# Patient Record
Sex: MALE | Race: ASIAN | NOT HISPANIC OR LATINO | Employment: OTHER | URBAN - METROPOLITAN AREA
[De-identification: names, ages, dates, MRNs, and addresses within clinical notes are randomized per-mention and may not be internally consistent; named-entity substitution may affect disease eponyms.]

---

## 2018-02-03 ENCOUNTER — OFFICE VISIT (OUTPATIENT)
Dept: URGENT CARE | Facility: CLINIC | Age: 73
End: 2018-02-03
Payer: COMMERCIAL

## 2018-02-03 VITALS
HEIGHT: 72 IN | OXYGEN SATURATION: 96 % | TEMPERATURE: 97.6 F | WEIGHT: 182 LBS | SYSTOLIC BLOOD PRESSURE: 124 MMHG | HEART RATE: 72 BPM | BODY MASS INDEX: 24.65 KG/M2 | DIASTOLIC BLOOD PRESSURE: 82 MMHG | RESPIRATION RATE: 16 BRPM

## 2018-02-03 DIAGNOSIS — J40 BRONCHITIS: ICD-10-CM

## 2018-02-03 PROCEDURE — 99214 OFFICE O/P EST MOD 30 MIN: CPT | Performed by: PHYSICIAN ASSISTANT

## 2018-02-03 RX ORDER — CLARITHROMYCIN 500 MG/1
500 TABLET, COATED ORAL 2 TIMES DAILY
Qty: 20 TAB | Refills: 0 | Status: SHIPPED | OUTPATIENT
Start: 2018-02-03 | End: 2018-02-13

## 2018-02-03 ASSESSMENT — ENCOUNTER SYMPTOMS
NEUROLOGICAL NEGATIVE: 1
SORE THROAT: 0
MYALGIAS: 0
FALLS: 0
EYES NEGATIVE: 1
COUGH: 1
CARDIOVASCULAR NEGATIVE: 1
SHORTNESS OF BREATH: 0
SPUTUM PRODUCTION: 0
FEVER: 0
CONSTITUTIONAL NEGATIVE: 1

## 2018-02-14 ENCOUNTER — OFFICE VISIT (OUTPATIENT)
Dept: MEDICAL GROUP | Facility: MEDICAL CENTER | Age: 73
End: 2018-02-14
Payer: COMMERCIAL

## 2018-02-14 VITALS
WEIGHT: 182 LBS | RESPIRATION RATE: 12 BRPM | HEIGHT: 74 IN | DIASTOLIC BLOOD PRESSURE: 84 MMHG | HEART RATE: 73 BPM | OXYGEN SATURATION: 97 % | TEMPERATURE: 97.9 F | SYSTOLIC BLOOD PRESSURE: 126 MMHG | BODY MASS INDEX: 23.36 KG/M2

## 2018-02-14 DIAGNOSIS — Z00.00 ROUTINE GENERAL MEDICAL EXAMINATION AT A HEALTH CARE FACILITY: ICD-10-CM

## 2018-02-14 DIAGNOSIS — R53.83 OTHER FATIGUE: ICD-10-CM

## 2018-02-14 DIAGNOSIS — F32.0 MILD SINGLE CURRENT EPISODE OF MAJOR DEPRESSIVE DISORDER (HCC): ICD-10-CM

## 2018-02-14 DIAGNOSIS — Z76.89 ENCOUNTER TO ESTABLISH CARE WITH NEW DOCTOR: ICD-10-CM

## 2018-02-14 DIAGNOSIS — E11.69 HYPERLIPIDEMIA ASSOCIATED WITH TYPE 2 DIABETES MELLITUS (HCC): ICD-10-CM

## 2018-02-14 DIAGNOSIS — Z12.5 SCREENING FOR PROSTATE CANCER: ICD-10-CM

## 2018-02-14 DIAGNOSIS — Z13.220 ENCOUNTER FOR LIPID SCREENING FOR CARDIOVASCULAR DISEASE: ICD-10-CM

## 2018-02-14 DIAGNOSIS — Z13.6 ENCOUNTER FOR LIPID SCREENING FOR CARDIOVASCULAR DISEASE: ICD-10-CM

## 2018-02-14 DIAGNOSIS — E11.9 DIABETES MELLITUS TYPE 2 IN NONOBESE (HCC): ICD-10-CM

## 2018-02-14 DIAGNOSIS — R35.1 NOCTURIA MORE THAN TWICE PER NIGHT: ICD-10-CM

## 2018-02-14 DIAGNOSIS — E78.5 HYPERLIPIDEMIA ASSOCIATED WITH TYPE 2 DIABETES MELLITUS (HCC): ICD-10-CM

## 2018-02-14 PROCEDURE — 99214 OFFICE O/P EST MOD 30 MIN: CPT | Performed by: FAMILY MEDICINE

## 2018-02-14 RX ORDER — FLUOXETINE HYDROCHLORIDE 20 MG/1
20 CAPSULE ORAL DAILY
COMMUNITY
End: 2018-03-28 | Stop reason: SDUPTHER

## 2018-02-14 ASSESSMENT — PATIENT HEALTH QUESTIONNAIRE - PHQ9: CLINICAL INTERPRETATION OF PHQ2 SCORE: 0

## 2018-02-14 NOTE — LETTER
[facility]  Neymar Klein M.D.  [address]  [place] [address]  Fax: [phone]   Authorization for Release/Disclosure of   Protected Health Information   Name: FARA ALONZO : 1945 SSN: [id number]   Address: [address] [place] [address]  [place] [address] Phone:    [phone] (home) [phone] (work)   I authorize the entity listed below to release/disclose the PHI below to:   [facility]/Neymar Klein M.D. and Neymar Klein M.D.   Provider or Entity Name: [facility]     Address   City, State, Zip   Phone:      Fax:     Reason for request: continuity of care   Information to be released:    [ XXX ] LAST COLONOSCOPY,  including any PATH REPORT and follow-up  [  ] LAST FIT/COLOGUARD RESULT [  ] LAST DEXA  [  ] LAST MAMMOGRAM  [  ] LAST PAP  [  ] LAST LABS [  ] RETINA EXAM REPORT  [  ] IMMUNIZATION RECORDS  [  ] Release all info      [  ] Check here and initial the line next to each item to release ALL health information INCLUDING  _____ Care and treatment for drug and / or alcohol abuse  _____ HIV testing, infection status, or AIDS  _____ Genetic Testing    DATES OF SERVICE OR TIME PERIOD TO BE DISCLOSED: _____________  I understand and acknowledge that:  * This Authorization may be revoked at any time by you in writing, except if your health information has already been used or disclosed.  * Your health information that will be used or disclosed as a result of you signing this authorization could be re-disclosed by the recipient. If this occurs, your re-disclosed health information may no longer be protected by State or Federal laws.  * You may refuse to sign this Authorization. Your refusal will not affect your ability to obtain treatment.  * This Authorization becomes effective upon signing and will  on (date) __________.      If no date is indicated, this Authorization will  one (1) year from the signature date.    Name: Fara  Fan    Signature:   Date:     2/14/2018       PLEASE FAX REQUESTED RECORDS BACK TO: [phone]

## 2018-02-22 LAB
ALBUMIN SERPL-MCNC: 4.5 G/DL (ref 3.5–4.8)
ALBUMIN/CREAT UR: 6 (ref 0–30)
ALBUMIN/GLOB SERPL: 1.8 {RATIO} (ref 1.2–2.2)
ALP SERPL-CCNC: 70 IU/L (ref 39–117)
ALT SERPL-CCNC: 52 IU/L (ref 0–44)
AST SERPL-CCNC: 33 IU/L (ref 0–40)
BILIRUB SERPL-MCNC: 1 MG/DL (ref 0–1.2)
BUN SERPL-MCNC: 12 MG/DL (ref 8–27)
BUN/CREAT SERPL: 12 (ref 10–24)
CALCIUM SERPL-MCNC: 9.6 MG/DL (ref 8.6–10.2)
CHLORIDE SERPL-SCNC: 95 MMOL/L (ref 96–106)
CHOLEST SERPL-MCNC: 159 MG/DL (ref 100–199)
CO2 SERPL-SCNC: 22 MMOL/L (ref 18–29)
CREAT SERPL-MCNC: 1.02 MG/DL (ref 0.76–1.27)
CREAT UR-MCNC: 155.9 MG/DL
GFR SERPLBLD CREATININE-BSD FMLA CKD-EPI: 73 ML/MIN/{1.73_M2}
GFR SERPLBLD CREATININE-BSD FMLA CKD-EPI: 84 ML/MIN/{1.73_M2}
GLOBULIN SER CALC-MCNC: 2.5 G/L (ref 1.5–4.5)
GLUCOSE SERPL-MCNC: 171 MG/DL (ref 65–99)
HBA1C MFR BLD: 8.2 % (ref 4.8–5.6)
HDLC SERPL-MCNC: 36 MG/DL
LABORATORY COMMENT REPORT: ABNORMAL
LDLC SERPL CALC-MCNC: 95 MG/DL (ref 0–99)
MICROALBUMIN UR-MCNC: 9.3 UG/ML
POTASSIUM SERPL-SCNC: 4.4 MMOL/L (ref 3.5–5.2)
PROT SERPL-MCNC: 7 G/DL (ref 6–8.5)
PSA SERPL-MCNC: 0.7 NG/ML (ref 0–4)
SODIUM SERPL-SCNC: 138 MMOL/L (ref 134–144)
TRIGL SERPL-MCNC: 142 MG/DL (ref 0–149)
TSH SERPL DL<=0.005 MIU/L-ACNC: 2.83 UIU/ML (ref 0.45–4.5)
VLDLC SERPL CALC-MCNC: 28 MG/DL (ref 5–40)

## 2018-02-22 NOTE — ASSESSMENT & PLAN NOTE
Patient with vague symptom of fatigue, states that he feels more tired than normal, that this has been chronic, hasn't gained weight, doesn't have cold intolerance, denies hair and skin changes.    ROS is NEGATIVE for: heat intolerance, anxiety/depression, chest pain/pressure, palpitations, diarrhea/constipation, unexpected weight change.

## 2018-02-22 NOTE — ASSESSMENT & PLAN NOTE
Chronic, stable, well controlled on Prozac 20 mg by mouth daily.  ROS is NEGATIVE for suicidal and homicidal ideation, also negative for visual or auditory hallucinations.

## 2018-02-22 NOTE — ASSESSMENT & PLAN NOTE
Patient reports diagnosis of HLD.  Patient is taking Simvastatin.  No recent labs.    ROS is NEGATIVE for dizziness, generalized weakness/fatigue, cold sweats, dizziness,  vision/hearing changes, jaw pain/paresthesias, BUE pain/paresthesias/numbness/weakness, chest pain/pressure, palpitations, dyspnea, nausea, RUQ abdominal pain, oliguria/anuria, BLE edema.

## 2018-02-22 NOTE — ASSESSMENT & PLAN NOTE
Chronic, uncontrolled, no s/sx of acute vs. Chronic prostatitis, no s/sx of UTI.  NO recent labs for prostate CA screening.    ROS is NEGATIVE for nocturia, polyuria, increased frequency of urination, urinary hesitancy, feeling of incomplete voiding, difficulty initiation urine stream, hematuria, pyuria    ROS is NEGATIVE for fevers, chills, rigors, flank pain, dysuria, hematuria, pyuria, polyuria, increased frequency of urination, diarrhea, constipation.

## 2018-02-22 NOTE — ASSESSMENT & PLAN NOTE
Patient reports diagnosis of DM2.  No recent labs.    We discussed that prediabetes/diabetes is a medical condition of lifestyle habits (less than optimal dietary choices, insufficient cardiovascular exercise).     ROS is NEGATIVE for blurred vision, polydipsia, polyuria, diaphoresis, palpitations, fatigue, irritability, flank pain, BLE paresthesias.

## 2018-02-22 NOTE — PROGRESS NOTES
Subjective:   Chief Complaint/History of Present Illness:  Fara Chavira is a 72 y.o. male patient new to [facility] who presents today to establish primary medical care and to discuss the following medical conditions.  PMH, PSH, Social History, Medications, Allergies all reviewed as documented:    Diabetes mellitus type 2 in nonobese (CMS-HCC)  Patient reports diagnosis of DM2.  No recent labs.    We discussed that prediabetes/diabetes is a medical condition of lifestyle habits (less than optimal dietary choices, insufficient cardiovascular exercise).     ROS is NEGATIVE for blurred vision, polydipsia, polyuria, diaphoresis, palpitations, fatigue, irritability, flank pain, BLE paresthesias.    Hyperlipidemia associated with type 2 diabetes mellitus (CMS-HCC)  Patient reports diagnosis of HLD.  Patient is taking Simvastatin.  No recent labs.    ROS is NEGATIVE for dizziness, generalized weakness/fatigue, cold sweats, dizziness,  vision/hearing changes, jaw pain/paresthesias, BUE pain/paresthesias/numbness/weakness, chest pain/pressure, palpitations, dyspnea, nausea, RUQ abdominal pain, oliguria/anuria, BLE edema.    Nocturia more than twice per night  Chronic, uncontrolled, no s/sx of acute vs. Chronic prostatitis, no s/sx of UTI.  NO recent labs for prostate CA screening.    ROS is NEGATIVE for nocturia, polyuria, increased frequency of urination, urinary hesitancy, feeling of incomplete voiding, difficulty initiation urine stream, hematuria, pyuria    ROS is NEGATIVE for fevers, chills, rigors, flank pain, dysuria, hematuria, pyuria, polyuria, increased frequency of urination, diarrhea, constipation.    Other fatigue  Patient with vague symptom of fatigue, states that he feels more tired than normal, that this has been chronic, hasn't gained weight, doesn't have cold intolerance, denies hair and skin changes.    ROS is NEGATIVE for: heat intolerance, anxiety/depression, chest pain/pressure, palpitations,  diarrhea/constipation, unexpected weight change.    Mild single current episode of major depressive disorder (CMS-HCC)  Chronic, stable, well controlled on Prozac 20 mg by mouth daily.  ROS is NEGATIVE for suicidal and homicidal ideation, also negative for visual or auditory hallucinations.      Patient Active Problem List    Diagnosis Date Noted   • Diabetes mellitus type 2 in nonobese (CMS-HCC) 2018   • Hyperlipidemia associated with type 2 diabetes mellitus (CMS-HCC) 2018   • Mild single current episode of major depressive disorder (CMS-HCC) 2018   • Nocturia more than twice per night 2018   • Other fatigue 2018       Additional History:   Allergies:    Mushroom extract complex and Pcn [penicillins]     Medications:     Current Outpatient Prescriptions Ordered in [facility]   Medication Sig Dispense Refill   • FLUoxetine (PROZAC) 20 MG Cap Take 20 mg by mouth every day.     • glipiZIDE SR (GLUCOTROL) 2.5 MG TABLET SR 24 HR Take 2.5 mg by mouth every day.     • METFORMIN HCL 1,000 mg by Does not apply route 2 Times a Day.     • simvastatin (ZOCOR) 10 MG Tab Take 10 mg by mouth every evening.     • valsartan (DIOVAN) 40 MG Tab Take 40 mg by mouth every day.     • ASPIRIN by Does not apply route.       No current [facility]-ordered facility-administered medications on file.         Past Medical History:     Past Medical History:   Diagnosis Date   • Diabetes (CMS-HCC)         Past Surgical History:     Past Surgical History:   Procedure Laterality Date   • OTHER ABDOMINAL SURGERY      Bowel surgery d/t intusussception (doesn't know LI vs. SI, no GI complaints now)        Social History:     Social History   Substance Use Topics   • Smoking status: Former Smoker     Quit date: 1971   • Smokeless tobacco: Never Used      Comment: experimented in college   • Alcohol use No        Family History:     Family Status   Relation Status   • Mother    • Father    • Sister Alive   •  "Brother    • Brother    • Brother Alive   • Brother Alive   • Brother Alive   • Sister Alive   • Sister    • Sister    • Sister         Family History   Problem Relation Age of Onset   • Diabetes Brother    • Kidney Disease Brother    • Diabetes Sister        ROS:     - Constitutional: Negative for fever, chills, unexpected weight change, and fatigue/generalized weakness.     - Respiratory: Negative for cough, sputum production, chest congestion, dyspnea, wheezing, and crackles.      - Cardiovascular: Negative for chest pain, palpitations, orthopnea, and bilateral lower extremity edema.     - Gastrointestinal: Negative for heartburn, nausea, vomiting, abdominal pain, hematochezia, melena, diarrhea, constipation, and greasy/foul-smelling stools.     - Musculoskeletal: Negative for myalgias, back pain, and joint pain.     - Skin: Negative for rash, itching, cyanotic skin color change.     - NOTE: All other systems reviewed and are negative, except as in HPI.     Objective:   Physical Exam:    Vitals: Blood pressure 126/84, pulse 73, temperature 36.6 °C (97.9 °F), resp. rate 12, height 1.88 m (6' 2.02\"), weight 82.6 kg (182 lb), SpO2 97 %.   BMI: Body mass index is 23.36 kg/m².   General/Constitutional: Vitals as above, Well nourished, well developed male in no acute distress   Head/Eyes:  - Head is grossly normal & atraumatic  - Bilateral conjunctivae clear and not injected, bilateral EOMI, bilateral PERRL   ENT:   - Bilateral external ears grossly normal in appearance, external auditory canals clear & bilateral TMs visualized with appropriate cone of light reflex, hearing grossly intact  - External nares normal in appearance and without discharge/bleeding, bilateral turbinates non-erythematous/non-edematous and without discharge/bleeding  - Good dentition ,  posterior oropharynx without erythema/edema/exudates  Neck: Neck supple, no masses, neck non-tender to palpation, no " thyromegaly/goiter   Respiratory: No respiratory distress, bilateral lungs are clear to auscultation in all lung fields (anterior/lateral/posterior), no wheezing/rhonchi/rales   Cardiovascular: Regular rate and rhythm without murmur/gallops/rubs, distal pulses equal and 2+ bilaterally (radial, posterior tibial), no bilateral lower extremity edema   Gastrointestinal: Abdomen resonant to percussion, Bowel sounds present in all 4 quadrants, abdomen non-tender to light and deep palpation   MSK: Gait grossly normal & not antalgic, no tenderness to percussion of vertebral processes, no CVAT, no bilateral SI joint tenderness   Integumentary: No apparent rashes   Neuro: Gross motor movement intact in all 4 extremities, Gross sensation intact to extremities and trunk, Gait grossly normal and not antalgic   Psych: Judgment grossly appropriate, no apparent depression/anxiety    Health Maintenance:     - I have requested previous records ([facility]), and will update accordingly.    Imaging/Labs:     - I have requested previous records ([facility]), and will update accordingly.    Assessment and Plan:   1. Encounter to establish care with new doctor  Chronic, stable, well-controlled.  In good health apart from fatigue.    2. Diabetes mellitus type 2 in nonobese (CMS-[facility])  Chronic, unknown control.  Labs to evaluate, continue present medications.   - HEMOGLOBIN A1C; Future   - COMP METABOLIC PANEL; Future   - LIPID PROFILE; Future   - MICROALB/CREAT RATIO RAND. UR    3. Hyperlipidemia associated with type 2 diabetes mellitus (CMS-HCC)  Chronic, unknown control.  Labs to evaluate, continue present medication.   - LIPID PROFILE; Future    4. Nocturia more than twice per night  Acute, new problem, uncontrolled.  PSA to evaluate, consider ELIZABETH depending on PSA level   - PROSTATE SPECIFIC AG SCREENING; Future    5. Other fatigue  Acute, new problem, uncontrolled, thyroid labs to evaluate.   - TSH WITH REFLEX TO  FT4; Future    6. Mild single current episode of major depressive disorder (CMS-HCC)  Chronic, Stable, well controlled. Continue Prozac at current dosage. Patient to consider down titration over time.   - COMP METABOLIC PANEL; Future    7. Routine general medical examination at a health care facility  Unknown control of metabolic health. Labs as below to evaluate.   - HEMOGLOBIN A1C; Future   - COMP METABOLIC PANEL; Future   - LIPID PROFILE; Future   - PROSTATE SPECIFIC AG SCREENING; Future   - MICROALB/CREAT RATIO RAND. UR   - TSH WITH REFLEX TO FT4; Future    8. Encounter for lipid screening for cardiovascular disease   - LIPID PROFILE; Future    9. Screening for prostate cancer   - PROSTATE SPECIFIC AG SCREENING; Future      RTC: in 1month for diabetes & depression management.    PLEASE NOTE: This dictation was created using voice recognition software. I have made every reasonable attempt to correct obvious errors, but I expect that there are errors of grammar and possibly content that I did not discover before finalizing the note.

## 2018-03-19 ENCOUNTER — OFFICE VISIT (OUTPATIENT)
Dept: MEDICAL GROUP | Facility: MEDICAL CENTER | Age: 73
End: 2018-03-19
Payer: COMMERCIAL

## 2018-03-19 VITALS
HEIGHT: 72 IN | HEART RATE: 63 BPM | DIASTOLIC BLOOD PRESSURE: 60 MMHG | WEIGHT: 180 LBS | BODY MASS INDEX: 24.38 KG/M2 | OXYGEN SATURATION: 95 % | TEMPERATURE: 98 F | SYSTOLIC BLOOD PRESSURE: 96 MMHG

## 2018-03-19 DIAGNOSIS — E78.5 HYPERLIPIDEMIA ASSOCIATED WITH TYPE 2 DIABETES MELLITUS (HCC): ICD-10-CM

## 2018-03-19 DIAGNOSIS — R35.1 NOCTURIA MORE THAN TWICE PER NIGHT: ICD-10-CM

## 2018-03-19 DIAGNOSIS — E11.69 HYPERLIPIDEMIA ASSOCIATED WITH TYPE 2 DIABETES MELLITUS (HCC): ICD-10-CM

## 2018-03-19 PROCEDURE — 99214 OFFICE O/P EST MOD 30 MIN: CPT | Performed by: FAMILY MEDICINE

## 2018-03-19 RX ORDER — VALSARTAN 80 MG/1
TABLET ORAL
COMMUNITY
Start: 2018-02-20 | End: 2018-03-19

## 2018-03-19 RX ORDER — SIMVASTATIN 20 MG
TABLET ORAL
COMMUNITY
Start: 2018-02-28 | End: 2018-04-03 | Stop reason: SDUPTHER

## 2018-03-19 RX ORDER — FLUOXETINE 20 MG/1
TABLET, FILM COATED ORAL
COMMUNITY
Start: 2018-02-20 | End: 2018-03-19

## 2018-03-19 NOTE — LETTER
[facility]  Neymar Klein M.D.  [address]  [facility] [address]  Fax: [phone]   Authorization for Release/Disclosure of   Protected Health Information   Name: EUGENIO ALONZO : 1945 SSN: [id number]   Address: [address] [place] [address] Phone:    [phone] (home) [phone] (work)   I authorize the entity listed below to release/disclose the PHI below to:   [facility]/Neymar Klein M.D. and Neymar Klein M.D.   Provider or Entity Name:  [facility]   Address   City, [facility], Zip:               [address] [place] [address]   Phone:  [phone]      Fax:      [phone]        Reason for request: continuity of care   Information to be released:    [ X ] LAST COLONOSCOPY,  including any PATH REPORT and follow-up  [ X ] LAST FIT/COLOGUARD RESULT [  ] LAST DEXA  [  ] LAST MAMMOGRAM  [  ] LAST PAP  [  ] LAST LABS [  ] RETINA EXAM REPORT  [  ] IMMUNIZATION RECORDS  [  ] Release all info      [  ] Check here and initial the line next to each item to release ALL health information INCLUDING  _____ Care and treatment for drug and / or alcohol abuse  _____ HIV testing, infection status, or AIDS  _____ Genetic Testing    DATES OF SERVICE OR TIME PERIOD TO BE DISCLOSED: _____________  I understand and acknowledge that:  * This Authorization may be revoked at any time by you in writing, except if your health information has already been used or disclosed.  * Your health information that will be used or disclosed as a result of you signing this authorization could be re-disclosed by the recipient. If this occurs, your re-disclosed health information may no longer be protected by State or Federal laws.  * You may refuse to sign this Authorization. Your refusal will not affect your ability to obtain treatment.  * This Authorization becomes effective upon signing and will  on (date) __________.      If no date is indicated, this  Authorization will  one (1) year from the signature date.    Name: Fraa Chavira    Signature:   Date:     3/19/2018       PLEASE FAX REQUESTED RECORDS BACK TO: [phone]

## 2018-03-19 NOTE — LETTER
[facility]  Neymar Klein M.D.  [address]  [place] [address]  Fax: [phone]   Authorization for Release/Disclosure of   Protected Health Information   Name: FARA ALONZO : 1945 SSN: [id number]   Address: [address] [place] [address]  [place] [address] Phone:    [phone] (home) [phone] (work)   I authorize the entity listed below to release/disclose the PHI below to:   [facility]/Neymar Klein M.D. and Neymar Klein M.D.   Provider or Entity Name:     Address   City, State, Zip   Phone:      Fax:     Reason for request: continuity of care   Information to be released:    [  ] LAST COLONOSCOPY,  including any PATH REPORT and follow-up  [  ] LAST FIT/COLOGUARD RESULT [  ] LAST DEXA  [  ] LAST MAMMOGRAM  [  ] LAST PAP  [  ] LAST LABS [  ] RETINA EXAM REPORT  [  ] IMMUNIZATION RECORDS  [  ] Release all info      [  ] Check here and initial the line next to each item to release ALL health information INCLUDING  _____ Care and treatment for drug and / or alcohol abuse  _____ HIV testing, infection status, or AIDS  _____ Genetic Testing    DATES OF SERVICE OR TIME PERIOD TO BE DISCLOSED: _____________  I understand and acknowledge that:  * This Authorization may be revoked at any time by you in writing, except if your health information has already been used or disclosed.  * Your health information that will be used or disclosed as a result of you signing this authorization could be re-disclosed by the recipient. If this occurs, your re-disclosed health information may no longer be protected by State or Federal laws.  * You may refuse to sign this Authorization. Your refusal will not affect your ability to obtain treatment.  * This Authorization becomes effective upon signing and will  on (date) __________.      If no date is indicated, this Authorization will  one (1) year from the signature date.    Name: Fara Alonzo    Signature:   Date:     3/19/2018       PLEASE FAX REQUESTED RECORDS BACK TO: [phone]

## 2018-03-19 NOTE — PATIENT INSTRUCTIONS
"Dr. Klein's tips for \"Lifestyle Medicine:\"     Check out the talk/documentary on \"How not to die\" by Dr. Hieu Madrid (on his website nutritionfacts.org, he also authored a book with this title).       1) Make SMART lifestyle changes: Specific, Measurable, Attainable, Relevant, Time-sensitive.  The lifestyle changes that you need to make are with regards to: nutrition, cardiovascular exercise, sleep, stress management.  Make these changes every 2 weeks, revisiting the previous goals and perhaps revising them and/or setting new ones.       2) Nutrition: Make as many changes as you can to increase the amount of whole-foods (not Whole Foods, necessarily!  ;-)), plant-based diet as possible:   A) Books: Eat to Live (Dr. Adalberto Holloway), The Spectrum (Dr. Alfredo Guerrero), The Starch Solution (Dr. Wiley Mendes)      B) Documentaries (can usually be found on [employer]): [place] Over [place]ives.  Fat, Sick, and Nearly Dead.  Fed Up.           3) Cardiovascular Exercise: The center for disease control recommends a minimum of 150 minutes per week of moderate intensity cardiovascular exercise for weight maintenance and cardiovascular health.  Set this as your initial goal, with at least 30 minutes per session. Types of exercise can include 30 minutes of elliptical, 30 minutes of decently fast jog, 30 minutes of swimming, 30 minutes of heavy gardening (lifting big bags of fertilizer, digging deep holes/ditches).  He can cut down the minute requirements to half, by doing higher intensity sports such as a game of tennis, or soccer.  He notes the library and check out with they have for home exercise programs, as well.       4) Sleep:    A) Goal: Obtain a minimum of 7-8hours of continuous, uninterrupted, restful sleep per night.    B) Tips for Sleep Hygiene:    I) Go to bed and wake up at consistent times whether work/school day or not.     II) Keep room dark, quiet, and comfortable.  Increase exposure to sunlight during awake times and " avoid bright lights (especially anything with a backlight) at least the last 1-2hours before going to sleep.     III) Don't nap.     IV) Avoid stimulant or caffeine use more than 4 hours after wake time.        5) Stress Management: You cannot change the stresses of life dizziness necessarily, but you can change how he responds of them. One good way to manage stress is to write things down in order to help you process how to approach things in general or specifically. Another good way is to talk it out with someone you trusts, specifically your significant other or good friend. A definite great way to deal with stress is to have cardiovascular exercise!

## 2018-03-23 NOTE — PROGRESS NOTES
Subjective:   Chief Complaint/History of Present Illness:  Fara Chavira is a 72 y.o. male established patient who presents today to discuss management of diabetes, HLD, and nocturia:    Uncontrolled type 2 diabetes mellitus without complication, without long-term current use of insulin (CMS-HCC)  We discussed that patient is diagnosed with diabetes, uncontrolled, given that the A1c is:   Lab Results   Component Value Date/Time    HBA1C 8.2 (H) 02/19/2018 10:10 AM        We discussed that prediabetes/diabetes is a medical condition of lifestyle habits (less than optimal dietary choices, insufficient cardiovascular exercise).     Furthermore, we discussed that at present time it is better to pursue lifestyle changes rather than changing medications. Patient is in agreement. Please see review of systems as below.    ROS is NEGATIVE for blurred vision, polydipsia, polyuria, diaphoresis, palpitations, fatigue, irritability, flank pain, BLE paresthesias.    Hyperlipidemia associated with type 2 diabetes mellitus (CMS-[facility])  Patient and I discussed recent labs (see below; HLD with low HDL) and that ASCVD risk is increased based on most recent lipid panel, current blood pressure (hypertensive, with medication), diabetes status (diabetic), and smoking status (non-smoker).    Patient and I then discussed necessary dietary changes to make to address dyslipidemia.  Patient verbalized understanding.    ROS is NEGATIVE for dizziness, generalized weakness/fatigue, vision/hearing changes, jaw pain/paresthesias, BUE pain/paresthesias/numbness/weakness, chest pain/pressure, palpitations, dyspnea, RUQ abdominal pain, oliguria/anuria, BLE edema.    Lab Results   Component Value Date/Time    CHOLSTRLTOT 159 02/19/2018 10:10 AM    LDL 95 02/19/2018 10:10 AM    HDL 36 (L) 02/19/2018 10:10 AM    TRIGLYCERIDE 142 02/19/2018 10:10 AM       Nocturia more than twice per night  Chronic, uncontrolled, unchanged from last visit on  02/21/18.      Patient Active Problem List    Diagnosis Date Noted   • Uncontrolled type 2 diabetes mellitus without complication, without long-term current use of insulin (CMS-HCC) 02/14/2018   • Hyperlipidemia associated with type 2 diabetes mellitus (CMS-HCC) 02/14/2018   • Mild single current episode of major depressive disorder (CMS-HCC) 02/14/2018   • Nocturia more than twice per night 02/14/2018   • Other fatigue 02/14/2018       Additional History:   Allergies:    Mushroom extract complex and Pcn [penicillins]     Current Medications:     Current Outpatient Prescriptions   Medication Sig Dispense Refill   • metformin (GLUCOPHAGE) 1000 MG tablet Take 1,000 mg by mouth 2 Times a Day.     • simvastatin (ZOCOR) 20 MG Tab      • FLUoxetine (PROZAC) 20 MG Cap Take 20 mg by mouth every day.     • glipiZIDE SR (GLUCOTROL) 2.5 MG TABLET SR 24 HR Take 2.5 mg by mouth 2 Times a Day.     • valsartan (DIOVAN) 40 MG Tab Take 40 mg by mouth every day.     • ASPIRIN by Does not apply route.       No current facility-administered medications for this visit.         Social History:     Social History   Substance Use Topics   • Smoking status: Former Smoker     Quit date: 12/26/1971   • Smokeless tobacco: Never Used      Comment: experimented in college   • Alcohol use No       ROS:     - NOTE: All other systems reviewed and are negative, except as in HPI.     Objective:   Physical Exam:    Vitals: Blood pressure (!) 96/60, pulse 63, temperature 36.7 °C (98 °F), height 1.829 m (6'), weight 81.6 kg (180 lb), SpO2 95 %.   BMI: Body mass index is 24.41 kg/m².   General/Constitutional: Vitals as above, Well nourished, well developed male in no acute distress   Head/Eyes: Head is grossly normal & atraumatic, bilateral conjunctivae clear and not injected, bilateral EOMI, bilateral PERRL   ENT: Bilateral external ears grossly normal in appearance, Hearing grossly intact, External nares normal in appearance and without  discharge/bleeding   Respiratory: No respiratory distress, bilateral lungs are clear to ausculation in all lung fields (anterior/lateral/posterior), no wheezing/rhonchi/rales   Cardiovascular: Regular rate and rhythm without murmur/gallops/rubs, distal pulses are intact and equal bilaterally (radial, posterior tibial), no bilateral lower extremity edema   MSK: Gait grossly normal & not antalgic   Integumentary: No apparent rashes   Diabetic Foot Exam: No ulcers/laceration/blisters present on bilateral feet, normal gross anatomy of bilateral feet without abnormal curvature or arch, no toe deformities, no toenail thickness, no ingrown toenails, no increase in skin temperature bilaterally, no skin erythema to bilateral feet, bilateral dorsalis pedis and posterior tibial pulses 2+ and equal, Refill less than 2 seconds bilaterally, Glenn 10 g monofilament testing normal in 3 of four regions in both feet (bilateral great toes, bilateral balls of feet at medial/lateral/mid ball of foot; diminished at ball of bilateral great toes)   Psych: Judgment grossly appropriate, no apparent depression/anxiety    Health Maintenance:     - Not addressed at this visit    Imaging/Labs:     - 02/22/18 - uncontrolled diabetes, low HDL (good cholesterol), CMP WNL, PSA WNL, TSH WNL    Assessment and Plan:   1. Uncontrolled type 2 diabetes mellitus without complication, without long-term current use of insulin (CMS-HCC)  Chronic, uncontrolled.  Continue current medication + start & strongly pursue lifestyle changes   - Diabetic Monofilament Lower Extremity Exam    2. Hyperlipidemia associated with type 2 diabetes mellitus (CMS-HCC)  Chronic, uncontrolled, continue statin + pursue lifestyle changes    3. Nocturia more than twice per night  Chronic, uncontrolled, patient Advised to be more mindful of fluid intake, and to present if this worsens/changes.  This could still be BPH, and we can initiate appropriate therapy if this doesn't change  despite fluid restriction.    RTC: in 3-4months for DM2 management, HLD management.    PLEASE NOTE: This dictation was created using voice recognition software. I have made every reasonable attempt to correct obvious errors, but I expect that there are errors of grammar and possibly content that I did not discover before finalizing the note.

## 2018-03-23 NOTE — ASSESSMENT & PLAN NOTE
We discussed that patient is diagnosed with diabetes, uncontrolled, given that the A1c is:   Lab Results   Component Value Date/Time    HBA1C 8.2 (H) 02/19/2018 10:10 AM        We discussed that prediabetes/diabetes is a medical condition of lifestyle habits (less than optimal dietary choices, insufficient cardiovascular exercise).     Furthermore, we discussed that at present time it is better to pursue lifestyle changes rather than changing medications. Patient is in agreement. Please see review of systems as below.    ROS is NEGATIVE for blurred vision, polydipsia, polyuria, diaphoresis, palpitations, fatigue, irritability, flank pain, BLE paresthesias.

## 2018-03-23 NOTE — ASSESSMENT & PLAN NOTE
Patient and I discussed recent labs (see below; HLD with low HDL) and that ASCVD risk is increased based on most recent lipid panel, current blood pressure (hypertensive, with medication), diabetes status (diabetic), and smoking status (non-smoker).    Patient and I then discussed necessary dietary changes to make to address dyslipidemia.  Patient verbalized understanding.    ROS is NEGATIVE for dizziness, generalized weakness/fatigue, vision/hearing changes, jaw pain/paresthesias, BUE pain/paresthesias/numbness/weakness, chest pain/pressure, palpitations, dyspnea, RUQ abdominal pain, oliguria/anuria, BLE edema.    Lab Results   Component Value Date/Time    CHOLSTRLTOT 159 02/19/2018 10:10 AM    LDL 95 02/19/2018 10:10 AM    HDL 36 (L) 02/19/2018 10:10 AM    TRIGLYCERIDE 142 02/19/2018 10:10 AM

## 2018-03-28 ENCOUNTER — PATIENT MESSAGE (OUTPATIENT)
Dept: MEDICAL GROUP | Facility: MEDICAL CENTER | Age: 73
End: 2018-03-28

## 2018-03-28 DIAGNOSIS — I10 ESSENTIAL HYPERTENSION: ICD-10-CM

## 2018-03-28 DIAGNOSIS — F39 MOOD DISORDER (HCC): ICD-10-CM

## 2018-03-28 RX ORDER — FLUOXETINE HYDROCHLORIDE 20 MG/1
20 CAPSULE ORAL DAILY
Qty: 90 CAP | Refills: 3 | Status: SHIPPED | OUTPATIENT
Start: 2018-03-28 | End: 2019-04-16 | Stop reason: SDUPTHER

## 2018-03-28 RX ORDER — VALSARTAN 40 MG/1
40 TABLET ORAL DAILY
Qty: 90 TAB | Refills: 3 | Status: SHIPPED | OUTPATIENT
Start: 2018-03-28 | End: 2019-04-16 | Stop reason: SDUPTHER

## 2018-04-03 DIAGNOSIS — E11.69 HYPERLIPIDEMIA ASSOCIATED WITH TYPE 2 DIABETES MELLITUS (HCC): ICD-10-CM

## 2018-04-03 DIAGNOSIS — E78.5 HYPERLIPIDEMIA ASSOCIATED WITH TYPE 2 DIABETES MELLITUS (HCC): ICD-10-CM

## 2018-04-03 RX ORDER — SIMVASTATIN 20 MG
20 TABLET ORAL EVERY EVENING
Qty: 90 TAB | Refills: 2 | Status: SHIPPED | OUTPATIENT
Start: 2018-04-03 | End: 2018-06-26 | Stop reason: SDUPTHER

## 2018-06-26 ENCOUNTER — PATIENT MESSAGE (OUTPATIENT)
Dept: MEDICAL GROUP | Facility: MEDICAL CENTER | Age: 73
End: 2018-06-26

## 2018-06-26 DIAGNOSIS — E11.69 HYPERLIPIDEMIA ASSOCIATED WITH TYPE 2 DIABETES MELLITUS (HCC): ICD-10-CM

## 2018-06-26 DIAGNOSIS — E78.5 HYPERLIPIDEMIA ASSOCIATED WITH TYPE 2 DIABETES MELLITUS (HCC): ICD-10-CM

## 2018-06-26 PROBLEM — I15.2 HYPERTENSION ASSOCIATED WITH DIABETES (HCC): Status: ACTIVE | Noted: 2018-03-28

## 2018-06-26 PROBLEM — E11.59 HYPERTENSION ASSOCIATED WITH DIABETES (HCC): Status: ACTIVE | Noted: 2018-03-28

## 2018-06-26 RX ORDER — SIMVASTATIN 20 MG
20 TABLET ORAL EVERY EVENING
Qty: 90 TAB | Refills: 2 | Status: SHIPPED | OUTPATIENT
Start: 2018-06-26 | End: 2019-04-16 | Stop reason: SDUPTHER

## 2018-08-02 ENCOUNTER — OFFICE VISIT (OUTPATIENT)
Dept: MEDICAL GROUP | Facility: MEDICAL CENTER | Age: 73
End: 2018-08-02
Payer: COMMERCIAL

## 2018-08-02 VITALS
WEIGHT: 184 LBS | DIASTOLIC BLOOD PRESSURE: 58 MMHG | SYSTOLIC BLOOD PRESSURE: 120 MMHG | OXYGEN SATURATION: 94 % | HEIGHT: 72 IN | TEMPERATURE: 97.7 F | HEART RATE: 65 BPM | BODY MASS INDEX: 24.92 KG/M2

## 2018-08-02 DIAGNOSIS — I15.2 HYPERTENSION ASSOCIATED WITH DIABETES (HCC): ICD-10-CM

## 2018-08-02 DIAGNOSIS — E11.69 HYPERLIPIDEMIA ASSOCIATED WITH TYPE 2 DIABETES MELLITUS (HCC): ICD-10-CM

## 2018-08-02 DIAGNOSIS — E78.5 HYPERLIPIDEMIA ASSOCIATED WITH TYPE 2 DIABETES MELLITUS (HCC): ICD-10-CM

## 2018-08-02 DIAGNOSIS — E11.59 HYPERTENSION ASSOCIATED WITH DIABETES (HCC): ICD-10-CM

## 2018-08-02 PROBLEM — R53.83 OTHER FATIGUE: Status: RESOLVED | Noted: 2018-02-14 | Resolved: 2018-08-02

## 2018-08-02 LAB
HBA1C MFR BLD: 7.8 % (ref ?–5.8)
INT CON NEG: NEGATIVE
INT CON POS: POSITIVE

## 2018-08-02 PROCEDURE — 83036 HEMOGLOBIN GLYCOSYLATED A1C: CPT | Performed by: FAMILY MEDICINE

## 2018-08-02 PROCEDURE — 99214 OFFICE O/P EST MOD 30 MIN: CPT | Performed by: FAMILY MEDICINE

## 2018-08-02 NOTE — ASSESSMENT & PLAN NOTE
Patient and I discussed recent labs (see below; mixed dyslipidemia, with low HDL at last check in February) and that ASCVD risk is increased based on most recent lipid panel, current blood pressure (hypertensive, with medication), diabetes status (diabetic), and smoking status (non-smoker).    Patient and I then discussed necessary dietary changes to make to address dyslipidemia.  Patient is currently taking cholesterol lowering medication: Simvastatin.  Patient verbalized understanding.    Of note, patient states that he is currently eating a breakfast omelette at work most days of the week.  While this does have a good amount of vegetables, I have advised patient to cut back on the number of eggs consumed per week as this can raise his LDL cholesterol and therefore his ASCVD risk.    ROS is NEGATIVE for dizziness, generalized weakness/fatigue, vision/hearing changes, jaw pain/paresthesias, BUE pain/paresthesias/numbness/weakness, chest pain/pressure, palpitations, dyspnea, RUQ abdominal pain, oliguria/anuria, BLE edema.    Lab Results   Component Value Date/Time    CHOLSTRLTOT 159 02/19/2018 10:10 AM    LDL 95 02/19/2018 10:10 AM    HDL 36 (L) 02/19/2018 10:10 AM    TRIGLYCERIDE 142 02/19/2018 10:10 AM

## 2018-08-02 NOTE — PROGRESS NOTES
Subjective:   Chief Complaint/History of Present Illness:  Fara Chavira is a 72 y.o. male established patient who presents today to discuss medical problems as listed below    Diagnoses of Uncontrolled type 2 diabetes mellitus without complication, without long-term current use of insulin (HCC), Hyperlipidemia associated with type 2 diabetes mellitus (HCC), and Hypertension associated with diabetes (HCC) were pertinent to this visit.    Uncontrolled type 2 diabetes mellitus without complication, without long-term current use of insulin (CMS-HCC) ([facility])  We discussed that patient is diagnosed with diabetes, chronic, non-controlled, but improved given that the A1c is:   Lab Results   Component Value Date/Time    HBA1C 7.8 08/02/2018 11:10 AM        Patient is currently taking (metformin and glipizide) for glycemic control, and (simvastatin) for health maintenance related to diabetes mellitus.    We discussed that prediabetes/diabetes mellitus type 2 are medical conditions of lifestyle habits (less than optimal dietary choices, insufficient cardiovascular exercise), and that they can be controlled (in part or in whole) by these lifestyle changes.     Furthermore, we discussed that at present time it is better to pursue lifestyle changes rather than changing medications. Patient is in agreement.     Please see assessment/plan as below for further details.    ROS is NEGATIVE for blurred vision, polydipsia, polyuria, diaphoresis, palpitations, fatigue, irritability, flank pain, BLE paresthesias.     Hyperlipidemia associated with type 2 diabetes mellitus (CMS-HCC) ([facility])  Patient and I discussed recent labs (see below; mixed dyslipidemia, with low HDL at last check in February) and that ASCVD risk is increased based on most recent lipid panel, current blood pressure (hypertensive, with medication), diabetes status (diabetic), and smoking status (non-smoker).    Patient and I then discussed necessary dietary changes to  make to address dyslipidemia.  Patient is currently taking cholesterol lowering medication: Simvastatin.  Patient verbalized understanding.    Of note, patient states that he is currently eating a breakfast omelette at work most days of the week.  While this does have a good amount of vegetables, I have advised patient to cut back on the number of eggs consumed per week as this can raise his LDL cholesterol and therefore his ASCVD risk.    ROS is NEGATIVE for dizziness, generalized weakness/fatigue, vision/hearing changes, jaw pain/paresthesias, BUE pain/paresthesias/numbness/weakness, chest pain/pressure, palpitations, dyspnea, RUQ abdominal pain, oliguria/anuria, BLE edema.    Lab Results   Component Value Date/Time    CHOLSTRLTOT 159 02/19/2018 10:10 AM    LDL 95 02/19/2018 10:10 AM    HDL 36 (L) 02/19/2018 10:10 AM    TRIGLYCERIDE 142 02/19/2018 10:10 AM       Hypertension associated with diabetes ([facility])  Chronic, stable, well-controlled, taking medication as directed.     ROS is NEGATIVE for dizziness, generalized weakness/fatigue, cold sweats, dizziness,  vision/hearing changes, jaw pain/paresthesias, BUE pain/paresthesias/numbness/weakness, chest pain/pressure, palpitations, dyspnea, nausea, RUQ abdominal pain, oliguria/anuria, BLE edema.      Patient Active Problem List    Diagnosis Date Noted   • Hypertension associated with diabetes ([facility]) 03/28/2018   • Mood disorder ([facility]) 03/28/2018   • Uncontrolled type 2 diabetes mellitus without complication, without long-term current use of insulin ([facility]) 02/14/2018   • Hyperlipidemia associated with type 2 diabetes mellitus ([facility]) 02/14/2018   • Mild single current episode of major depressive disorder ([facility]) 02/14/2018   • Nocturia more than twice per night 02/14/2018       Additional History:   Allergies:    Mushroom extract complex and Pcn [penicillins]     Current Medications:     Current Outpatient Prescriptions   Medication Sig Dispense Refill   • simvastatin  (ZOCOR) 20 MG Tab Take 1 Tab by mouth every evening. 90 Tab 2   • metformin (GLUCOPHAGE) 1000 MG tablet Take 1 Tab by mouth 2 Times a Day. 180 Tab 2   • FLUoxetine (PROZAC) 20 MG Cap Take 1 Cap by mouth every day. 90 Cap 3   • valsartan (DIOVAN) 40 MG Tab Take 1 Tab by mouth every day. 90 Tab 3   • glipiZIDE SR (GLUCOTROL) 2.5 MG TABLET SR 24 HR Take 2.5 mg by mouth 2 Times a Day.     • ASPIRIN by Does not apply route.       No current facility-administered medications for this visit.         Social History:     Social History   Substance Use Topics   • Smoking status: Former Smoker     Quit date: 12/26/1971   • Smokeless tobacco: Never Used      Comment: experimented in college   • Alcohol use No       ROS:     - NOTE: All other systems reviewed and are negative, except as in HPI.     Objective:   Physical Exam:    Vitals: Blood pressure 120/58, pulse 65, temperature 36.5 °C (97.7 °F), height 1.829 m (6'), weight 83.5 kg (184 lb), SpO2 94 %.   BMI: Body mass index is 24.95 kg/m².   General/Constitutional: Vitals as above, Well nourished, well developed male in no acute distress   Head/Eyes: Head is grossly normal & atraumatic, bilateral conjunctivae clear and not injected, bilateral EOMI, bilateral PERRL   ENT: Bilateral external ears grossly normal in appearance, Hearing grossly intact, External nares normal in appearance and without discharge/bleeding   Respiratory: No respiratory distress, bilateral lungs are clear to ausculation in all lung fields (anterior/lateral/posterior), no wheezing/rhonchi/rales   Cardiovascular: Regular rate and rhythm without murmur/gallops/rubs, distal pulses are intact and equal bilaterally (radial, posterior tibial), no bilateral lower extremity edema   MSK: Gait grossly normal & not antalgic   Integumentary: No apparent rashes   Psych: Judgment grossly appropriate, no apparent depression/anxiety    Health Maintenance:     - A1c will be screened today, no other diabetic health  maintenance due at this time.    Imaging/Labs:     - Point-of-care A1c today is 7.8%.    Assessment and Plan:   1. Uncontrolled type 2 diabetes mellitus without complication, without long-term current use of insulin (HCC)  Chronic, uncontrolled, improving.  Patient advised to continue with dietary changes, as well as changes to progress exercise.  Additionally, patient will work on decreasing his consumption of eggs and other foods that can elevate his LDL cholesterol.  We will then recheck his lipid profile in approximately 3 months.   - POCT  A1C   - LIPID PROFILE; Future    2. Hyperlipidemia associated with type 2 diabetes mellitus (HCC)  Chronic, controlled, presumed to be worsening since patient is eating eggs on a more regular basis and he was previously.  Please see lifestyle changes advice as above.   - LIPID PROFILE; Future    3. Hypertension associated with diabetes (HCC)  Chronic, stable, controlled.  Continue current medications, and work on lifestyle changes.   - LIPID PROFILE; Future        RTC: In 3 months to follow-up on diabetes management, cholesterol management, and weight loss.    PLEASE NOTE: This dictation was created using voice recognition software. I have made every reasonable attempt to correct obvious errors, but I expect that there are errors of grammar and possibly content that I did not discover before finalizing the note.

## 2018-08-02 NOTE — LETTER
[facility]  Neymar Klein M.D.  [address]  [place] [address]  Fax: [phone]   Authorization for Release/Disclosure of   Protected Health Information   Name: FARA ALONZO : 1945 SSN: [id number]   Address: [address] [place] [address]  [place] [address] Phone:    [phone] (home) [phone] (work)   I authorize the entity listed below to release/disclose the PHI below to:   [facility]/Neymar Klein M.D. and Neymar Klein M.D.   Provider or Entity Name:  [facility] [facility]   Address   City, State, [facility]   Phone:      Fax:     Reason for request: continuity of care   Information to be released:    [  ] LAST COLONOSCOPY,  including any PATH REPORT and follow-up  [  ] LAST FIT/COLOGUARD RESULT [  ] LAST DEXA  [  ] LAST MAMMOGRAM  [  ] LAST PAP  [  ] LAST LABS [  ] RETINA EXAM REPORT  [X] IMMUNIZATION RECORDS  [  ] Release all info      [  ] Check here and initial the line next to each item to release ALL health information INCLUDING  _____ Care and treatment for drug and / or alcohol abuse  _____ HIV testing, infection status, or AIDS  _____ Genetic Testing    DATES OF SERVICE OR TIME PERIOD TO BE DISCLOSED: _____________  I understand and acknowledge that:  * This Authorization may be revoked at any time by you in writing, except if your health information has already been used or disclosed.  * Your health information that will be used or disclosed as a result of you signing this authorization could be re-disclosed by the recipient. If this occurs, your re-disclosed health information may no longer be protected by State or Federal laws.  * You may refuse to sign this Authorization. Your refusal will not affect your ability to obtain treatment.  * This Authorization becomes effective upon signing and will  on (date) __________.      If no date is indicated, this Authorization will  one (1) year from the signature date.    Name: Fara Alonzo    Signature:   Date:     8/2/2018       PLEASE FAX REQUESTED RECORDS BACK TO: [phone]

## 2018-08-02 NOTE — ASSESSMENT & PLAN NOTE
Chronic, stable, well-controlled, taking medication as directed.     ROS is NEGATIVE for dizziness, generalized weakness/fatigue, cold sweats, dizziness,  vision/hearing changes, jaw pain/paresthesias, BUE pain/paresthesias/numbness/weakness, chest pain/pressure, palpitations, dyspnea, nausea, RUQ abdominal pain, oliguria/anuria, BLE edema.

## 2018-08-02 NOTE — ASSESSMENT & PLAN NOTE
We discussed that patient is diagnosed with diabetes, chronic, non-controlled, but improved given that the A1c is:   Lab Results   Component Value Date/Time    HBA1C 7.8 08/02/2018 11:10 AM        Patient is currently taking (metformin and glipizide) for glycemic control, and (simvastatin) for health maintenance related to diabetes mellitus.    We discussed that prediabetes/diabetes mellitus type 2 are medical conditions of lifestyle habits (less than optimal dietary choices, insufficient cardiovascular exercise), and that they can be controlled (in part or in whole) by these lifestyle changes.     Furthermore, we discussed that at present time it is better to pursue lifestyle changes rather than changing medications. Patient is in agreement.     Please see assessment/plan as below for further details.    ROS is NEGATIVE for blurred vision, polydipsia, polyuria, diaphoresis, palpitations, fatigue, irritability, flank pain, BLE paresthesias.

## 2018-08-02 NOTE — LETTER
[facility]  Neymar Klein M.D.  [address]  [place] [address]  Fax: [phone]   Authorization for Release/Disclosure of   Protected Health Information   Name: FARA ALONZO : 1945 SSN: [id number]   Address: [address] [place] [address]  [place] [address] Phone:    [phone] (home) [phone] (work)   I authorize the entity listed below to release/disclose the PHI below to:   [facility]/Neymar Klein M.D. and Neymar Klein M.D.   Provider or Entity Name:  [facility]   Address   City, [facility]   Phone:      Fax:     Reason for request: continuity of care   Information to be released:    [  ] LAST COLONOSCOPY,  including any PATH REPORT and follow-up  [  ] LAST FIT/COLOGUARD RESULT [  ] LAST DEXA  [  ] LAST MAMMOGRAM  [  ] LAST PAP  [  ] LAST LABS [  ] RETINA EXAM REPORT  [X] IMMUNIZATION RECORDS  [  ] Release all info      [  ] Check here and initial the line next to each item to release ALL health information INCLUDING  _____ Care and treatment for drug and / or alcohol abuse  _____ HIV testing, infection status, or AIDS  _____ Genetic Testing    DATES OF SERVICE OR TIME PERIOD TO BE DISCLOSED: _____________  I understand and acknowledge that:  * This Authorization may be revoked at any time by you in writing, except if your health information has already been used or disclosed.  * Your health information that will be used or disclosed as a result of you signing this authorization could be re-disclosed by the recipient. If this occurs, your re-disclosed health information may no longer be protected by State or Federal laws.  * You may refuse to sign this Authorization. Your refusal will not affect your ability to obtain treatment.  * This Authorization becomes effective upon signing and will  on (date) __________.      If no date is indicated, this Authorization will  one (1) year from the signature date.    Name: Fara Alonzo    Signature:   Date:     8/2/2018       PLEASE FAX REQUESTED RECORDS BACK TO: [phone]

## 2018-10-06 ENCOUNTER — HOSPITAL ENCOUNTER (OUTPATIENT)
Dept: RADIOLOGY | Facility: MEDICAL CENTER | Age: 73
End: 2018-10-06
Attending: PHYSICIAN ASSISTANT
Payer: COMMERCIAL

## 2018-10-06 ENCOUNTER — OFFICE VISIT (OUTPATIENT)
Dept: URGENT CARE | Facility: CLINIC | Age: 73
End: 2018-10-06
Payer: COMMERCIAL

## 2018-10-06 VITALS
HEIGHT: 71 IN | WEIGHT: 178 LBS | OXYGEN SATURATION: 95 % | BODY MASS INDEX: 24.92 KG/M2 | DIASTOLIC BLOOD PRESSURE: 80 MMHG | TEMPERATURE: 99.2 F | HEART RATE: 96 BPM | SYSTOLIC BLOOD PRESSURE: 118 MMHG

## 2018-10-06 DIAGNOSIS — R10.9 LEFT LATERAL ABDOMINAL PAIN: ICD-10-CM

## 2018-10-06 PROCEDURE — 99213 OFFICE O/P EST LOW 20 MIN: CPT | Performed by: PHYSICIAN ASSISTANT

## 2018-10-06 PROCEDURE — 74176 CT ABD & PELVIS W/O CONTRAST: CPT

## 2018-10-06 ASSESSMENT — ENCOUNTER SYMPTOMS
DIARRHEA: 1
VOMITING: 1
MUSCULOSKELETAL NEGATIVE: 1
FEVER: 0
ABDOMINAL PAIN: 1
CONSTITUTIONAL NEGATIVE: 1

## 2018-10-06 ASSESSMENT — CROHNS DISEASE ACTIVITY INDEX (CDAI): CDAI SCORE: 0

## 2018-10-06 NOTE — PROGRESS NOTES
"Subjective:      Fara Chavira is a 72 y.o. male who presents with Abdominal Pain (started last night with discomfort, vomiting, nausea, has been vomiting all morning, suspect food yesterday)            Abdominal Pain   This is a new (naus,vom/ since last night, (?prob food related);; very aacute pn last night; better now ) problem. The current episode started yesterday. The onset quality is undetermined. The problem occurs intermittently. The problem has been waxing and waning. The pain is located in the LLQ, left flank and periumbilical region. The pain is mild. The quality of the pain is aching. Associated symptoms include diarrhea and vomiting. Pertinent negatives include no fever. The pain is aggravated by eating. The pain is relieved by nothing. He has tried nothing for the symptoms. The treatment provided no relief. There is no history of abdominal surgery, colon cancer, Crohn's disease, gallstones, GERD, irritable bowel syndrome, pancreatitis, PUD or ulcerative colitis.       Review of Systems   Constitutional: Negative.  Negative for fever.   HENT: Negative.    Gastrointestinal: Positive for abdominal pain, diarrhea and vomiting.   Genitourinary: Negative.    Musculoskeletal: Negative.    Skin: Negative.           Objective:     /80 (BP Location: Left arm, Patient Position: Sitting, BP Cuff Size: Adult)   Pulse 96   Temp 37.3 °C (99.2 °F) (Temporal)   Ht 1.803 m (5' 11\")   Wt 80.7 kg (178 lb)   SpO2 95%   BMI 24.83 kg/m²      Physical Exam   Constitutional: He is oriented to person, place, and time. He appears well-developed and well-nourished. No distress.   HENT:   Head: Normocephalic and atraumatic.   Mouth/Throat: Oropharynx is clear and moist.   Abdominal: Soft. Bowel sounds are normal. He exhibits no distension. There is no tenderness.   Neurological: He is alert and oriented to person, place, and time.   Skin: Skin is warm and dry.   Psychiatric: He has a normal mood and affect. His " behavior is normal.   Nursing note and vitals reviewed.    Active Ambulatory Problems     Diagnosis Date Noted   • Uncontrolled type 2 diabetes mellitus without complication, without long-term current use of insulin ([facility]) 02/14/2018   • Hyperlipidemia associated with type 2 diabetes mellitus ([facility]) 02/14/2018   • Mild single current episode of major depressive disorder ([facility]) 02/14/2018   • Nocturia more than twice per night 02/14/2018   • Hypertension associated with diabetes ([facility]) 03/28/2018   • Mood disorder ([facility]) 03/28/2018     Resolved Ambulatory Problems     Diagnosis Date Noted   • Other fatigue 02/14/2018     Past Medical History:   Diagnosis Date   • Diabetes ([facility])      Current Outpatient Prescriptions on File Prior to Visit   Medication Sig Dispense Refill   • simvastatin (ZOCOR) 20 MG Tab Take 1 Tab by mouth every evening. 90 Tab 2   • metformin (GLUCOPHAGE) 1000 MG tablet Take 1 Tab by mouth 2 Times a Day. 180 Tab 2   • FLUoxetine (PROZAC) 20 MG Cap Take 1 Cap by mouth every day. 90 Cap 3   • valsartan (DIOVAN) 40 MG Tab Take 1 Tab by mouth every day. 90 Tab 3   • glipiZIDE SR (GLUCOTROL) 2.5 MG TABLET SR 24 HR Take 2.5 mg by mouth 2 Times a Day.     • ASPIRIN by Does not apply route.       No current facility-administered medications on file prior to visit.      Social History     Social History   • Marital status:      Spouse name: N/A   • Number of children: N/A   • Years of education: N/A     Occupational History   • Not on file.     Social History Main Topics   • Smoking status: Former Smoker     Quit date: 12/26/1971   • Smokeless tobacco: Never Used      Comment: experimented in college   • Alcohol use No   • Drug use: No   • Sexual activity: Yes     Partners: Female     Birth control/ protection: Post-Menopausal     Other Topics Concern   • Not on file     Social History Narrative   • No narrative on file     Family History   Problem Relation Age of Onset   • Diabetes Brother    • Kidney  Disease Brother    • Diabetes Sister      Mushroom extract complex and Pcn [penicillins]       CT=  sm stone L side ; no other acute [?liver mass; told pt f/u PCP this wk];pt called by me       Assessment/Plan:     · L abd pn;  AGE sx; prob food etiol      · rx meds; flds/gatorade  · Go to ER if pesists

## 2018-10-16 ENCOUNTER — OFFICE VISIT (OUTPATIENT)
Dept: MEDICAL GROUP | Facility: MEDICAL CENTER | Age: 73
End: 2018-10-16
Payer: COMMERCIAL

## 2018-10-16 VITALS
DIASTOLIC BLOOD PRESSURE: 60 MMHG | BODY MASS INDEX: 25.2 KG/M2 | HEART RATE: 60 BPM | WEIGHT: 180 LBS | HEIGHT: 71 IN | SYSTOLIC BLOOD PRESSURE: 109 MMHG | OXYGEN SATURATION: 97 % | TEMPERATURE: 97.8 F

## 2018-10-16 DIAGNOSIS — R16.0 LIVER MASS: ICD-10-CM

## 2018-10-16 DIAGNOSIS — Z71.84 TRAVEL ADVICE ENCOUNTER: ICD-10-CM

## 2018-10-16 PROCEDURE — 99214 OFFICE O/P EST MOD 30 MIN: CPT | Performed by: PHYSICIAN ASSISTANT

## 2018-10-16 RX ORDER — GLIPIZIDE 2.5 MG/1
2.5 TABLET, EXTENDED RELEASE ORAL 2 TIMES DAILY
Qty: 180 TAB | Refills: 1 | Status: SHIPPED | OUTPATIENT
Start: 2018-10-16 | End: 2019-04-16 | Stop reason: SDUPTHER

## 2018-10-16 NOTE — ASSESSMENT & PLAN NOTE
Also is going to [facility] which is his home country. He has wife will be going for 3-1/2 weeks. Requesting typhoid and malaria medication. Also requesting tetanus pertussis vaccine. Has an appointment in early November to discuss with his PCP.

## 2018-10-16 NOTE — PROGRESS NOTES
Subjective:   Fara Chavira is a 73 y.o. male here today for possible liver mass seen on CT, uncontrolled diabetes and travel advice.    Liver mass  This is a 73-year-old male accompanied by his wife. Wife's name is Mikayla. He recently had a CT scan for a viral gastroenteritis. CT scan of his abdomen showed the following:    Diffuse hepatic steatosis. There is a 4.1 cm masslike area of slightly increased density in the posterior right hepatic lobe. This could relate to focal fatty sparing. A mass cannot be excluded. Further evaluation with MRI liver on nonemergent basis.    1. Tiny obstructive left renal calculus. No ureteral calculus identified. No renal collecting system in dilatation.    2. Enlarged prostate. Diffuse urinary bladder wall thickening could relate to bladder outlet obstruction or inflammation.    3. Mildly prominent small bowel loop in the left lower quadrant with no discrete transition point. This could relate to focal ileus. No inflammatory change is seen at the left lower quadrant.    Today his concern is with the liver mass. He denies any history of hepatitis. Denies any abdominal pain. Had a CMP performed in February that showed slight elevation in his ALT.    Uncontrolled type 2 diabetes mellitus without complication, without long-term current use of insulin (CMS-HCC) (HCC)  Has a chronic history of diabetes. Requesting glipizide 2.5 mg which she takes one tablet twice a day. Recent A1c at 7.8. Also taking metformin.    Travel advice encounter  Also is going to [place] which is his home country. He has wife will be going for 3-1/2 weeks. Requesting typhoid and malaria medication. Also requesting tetanus pertussis vaccine. Has an appointment in early November to discuss with his PCP.       Current medicines (including changes today)  Current Outpatient Prescriptions   Medication Sig Dispense Refill   • glipiZIDE SR (GLUCOTROL) 2.5 MG TABLET SR 24 HR Take 1 Tab by mouth 2 Times a Day. 180 Tab 1  "  • simvastatin (ZOCOR) 20 MG Tab Take 1 Tab by mouth every evening. 90 Tab 2   • metformin (GLUCOPHAGE) 1000 MG tablet Take 1 Tab by mouth 2 Times a Day. 180 Tab 2   • FLUoxetine (PROZAC) 20 MG Cap Take 1 Cap by mouth every day. 90 Cap 3   • valsartan (DIOVAN) 40 MG Tab Take 1 Tab by mouth every day. 90 Tab 3   • ASPIRIN by Does not apply route.       No current facility-administered medications for this visit.      He  has a past medical history of Diabetes (HCC).    Social History and Family History were reviewed and updated.    ROS   No chest pain, no shortness of breath, no abdominal pain and all other systems were reviewed and are negative.       Objective:     Blood pressure 109/60, pulse 60, temperature 36.6 °C (97.8 °F), height 1.803 m (5' 11\"), weight 81.6 kg (180 lb), SpO2 97 %. Body mass index is 25.1 kg/m².   Physical Exam:  Constitutional: Alert, no distress.  Skin: Warm, dry, good turgor, no rashes in visible areas.  Eye: Equal, round and reactive, conjunctiva clear, lids normal.  ENMT: Lips without lesions, good dentition, oropharynx clear.  Neck: Trachea midline, no masses.   Lymph: No cervical or supraclavicular lymphadenopathy  Respiratory: Unlabored respiratory effort, lungs clear to auscultation, no wheezes, no ronchi.  Cardiovascular: Normal S1, S2, no murmur, no edema.  Abdomen: Soft, non-tender, no masses.  Psych: Alert and oriented x3, normal affect and mood.        Assessment and Plan:   The following treatment plan was discussed    1. Liver mass  Incidental finding on CT scan. Advised to have CT results read by PCP in next visit. At this time will order MR abdomen with and without contrast. Hopefully benign finding.  - MR-ABDOMEN-WITH & W/O; Future    2. Uncontrolled type 2 diabetes mellitus without complication, without long-term current use of insulin (HCC)  Chronic condition. Stable. Renew glipizide. May take both tablets together. If medication doesn't cause any hypoglycemia which is " sure and will provide a prescription for 5 mg tablet.  - glipiZIDE SR (GLUCOTROL) 2.5 MG TABLET SR 24 HR; Take 1 Tab by mouth 2 Times a Day.  Dispense: 180 Tab; Refill: 1    3. Travel advice encounter  Discussed that likely antimalarial medication is not necessary. Also may  prescription from PCP for typhoid vaccine. As for tetanus pertussis advised to go to the pharmacy before he sees his PCP.    Patient was seen for 25 minutes face to face of which > 50% of appointment time was spent on counseling and coordination of care regarding the above.    Followup: Return if symptoms worsen or fail to improve.    Please note that this dictation was created using voice recognition software. I have made every reasonable attempt to correct obvious errors, but I expect that there are errors of grammar and possibly content that I did not discover before finalizing the note.

## 2018-10-16 NOTE — ASSESSMENT & PLAN NOTE
This is a 73-year-old male accompanied by his wife. Wife's name is Mikayla. He recently had a CT scan for a viral gastroenteritis. CT scan of his abdomen showed the following:    Diffuse hepatic steatosis. There is a 4.1 cm masslike area of slightly increased density in the posterior right hepatic lobe. This could relate to focal fatty sparing. A mass cannot be excluded. Further evaluation with MRI liver on nonemergent basis.    1. Tiny obstructive left renal calculus. No ureteral calculus identified. No renal collecting system in dilatation.    2. Enlarged prostate. Diffuse urinary bladder wall thickening could relate to bladder outlet obstruction or inflammation.    3. Mildly prominent small bowel loop in the left lower quadrant with no discrete transition point. This could relate to focal ileus. No inflammatory change is seen at the left lower quadrant.    Today his concern is with the liver mass. He denies any history of hepatitis. Denies any abdominal pain. Had a CMP performed in February that showed slight elevation in his ALT.

## 2018-10-16 NOTE — ASSESSMENT & PLAN NOTE
Has a chronic history of diabetes. Requesting glipizide 2.5 mg which she takes one tablet twice a day. Recent A1c at 7.8. Also taking metformin.

## 2018-10-20 ENCOUNTER — HOSPITAL ENCOUNTER (OUTPATIENT)
Dept: LAB | Facility: MEDICAL CENTER | Age: 73
End: 2018-10-20
Attending: FAMILY MEDICINE
Payer: COMMERCIAL

## 2018-10-20 DIAGNOSIS — E11.59 HYPERTENSION ASSOCIATED WITH DIABETES (HCC): ICD-10-CM

## 2018-10-20 DIAGNOSIS — E78.5 HYPERLIPIDEMIA ASSOCIATED WITH TYPE 2 DIABETES MELLITUS (HCC): ICD-10-CM

## 2018-10-20 DIAGNOSIS — I15.2 HYPERTENSION ASSOCIATED WITH DIABETES (HCC): ICD-10-CM

## 2018-10-20 DIAGNOSIS — E11.69 HYPERLIPIDEMIA ASSOCIATED WITH TYPE 2 DIABETES MELLITUS (HCC): ICD-10-CM

## 2018-10-20 LAB
CHOLEST SERPL-MCNC: 113 MG/DL (ref 100–199)
FASTING STATUS PATIENT QL REPORTED: NORMAL
HDLC SERPL-MCNC: 35 MG/DL
LDLC SERPL CALC-MCNC: 50 MG/DL
TRIGL SERPL-MCNC: 140 MG/DL (ref 0–149)

## 2018-10-20 PROCEDURE — 36415 COLL VENOUS BLD VENIPUNCTURE: CPT

## 2018-10-20 PROCEDURE — 80061 LIPID PANEL: CPT

## 2018-10-25 ENCOUNTER — HOSPITAL ENCOUNTER (OUTPATIENT)
Dept: LAB | Facility: MEDICAL CENTER | Age: 73
End: 2018-10-25
Attending: PHYSICIAN ASSISTANT
Payer: COMMERCIAL

## 2018-10-25 ENCOUNTER — HOSPITAL ENCOUNTER (OUTPATIENT)
Dept: RADIOLOGY | Facility: MEDICAL CENTER | Age: 73
End: 2018-10-25
Attending: PHYSICIAN ASSISTANT
Payer: COMMERCIAL

## 2018-10-25 DIAGNOSIS — R16.0 LIVER MASS: ICD-10-CM

## 2018-10-25 LAB — CREAT SERPL-MCNC: 0.91 MG/DL (ref 0.5–1.4)

## 2018-10-25 PROCEDURE — 74183 MRI ABD W/O CNTR FLWD CNTR: CPT

## 2018-10-25 PROCEDURE — 36415 COLL VENOUS BLD VENIPUNCTURE: CPT

## 2018-10-25 PROCEDURE — 82565 ASSAY OF CREATININE: CPT

## 2018-10-25 PROCEDURE — 700117 HCHG RX CONTRAST REV CODE 255: Performed by: PHYSICIAN ASSISTANT

## 2018-10-25 PROCEDURE — A9585 GADOBUTROL INJECTION: HCPCS | Performed by: PHYSICIAN ASSISTANT

## 2018-10-25 RX ORDER — GADOBUTROL 604.72 MG/ML
10 INJECTION INTRAVENOUS ONCE
Status: COMPLETED | OUTPATIENT
Start: 2018-10-25 | End: 2018-10-25

## 2018-10-25 RX ADMIN — GADOBUTROL 10 ML: 604.72 INJECTION INTRAVENOUS at 10:54

## 2018-10-25 NOTE — PROGRESS NOTES
Pt here for an abdomen MRI.  Pt's screening sheet marked 'yes' to coil/filter/stent. Pt states he has some type of stent in his groin/testicle area placed in 1992 for fertility reasons.  Pt does not have any documentation on implant and does not remember the facility he had it placed.  I consulted our radiologist Dr. Yates, he states it is ok to scan the patient.  Pt given the emergency squeeze ball and instructed to inform tech of any discomfort during the scan.  Scan completed without incident.

## 2018-11-06 ENCOUNTER — OFFICE VISIT (OUTPATIENT)
Dept: MEDICAL GROUP | Facility: MEDICAL CENTER | Age: 73
End: 2018-11-06
Payer: COMMERCIAL

## 2018-11-06 VITALS
HEART RATE: 72 BPM | WEIGHT: 171.6 LBS | DIASTOLIC BLOOD PRESSURE: 50 MMHG | TEMPERATURE: 97.8 F | BODY MASS INDEX: 24.02 KG/M2 | HEIGHT: 71 IN | OXYGEN SATURATION: 96 % | SYSTOLIC BLOOD PRESSURE: 102 MMHG

## 2018-11-06 DIAGNOSIS — E11.59 HYPERTENSION ASSOCIATED WITH DIABETES (HCC): ICD-10-CM

## 2018-11-06 DIAGNOSIS — Z00.00 ANNUAL PHYSICAL EXAM: ICD-10-CM

## 2018-11-06 DIAGNOSIS — Z29.89 NEED FOR MALARIA PROPHYLAXIS: ICD-10-CM

## 2018-11-06 DIAGNOSIS — E78.5 HYPERLIPIDEMIA ASSOCIATED WITH TYPE 2 DIABETES MELLITUS (HCC): ICD-10-CM

## 2018-11-06 DIAGNOSIS — D18.00 BENIGN HEMANGIOMA: ICD-10-CM

## 2018-11-06 DIAGNOSIS — Z71.84 TRAVEL ADVICE ENCOUNTER: ICD-10-CM

## 2018-11-06 DIAGNOSIS — E11.69 HYPERLIPIDEMIA ASSOCIATED WITH TYPE 2 DIABETES MELLITUS (HCC): ICD-10-CM

## 2018-11-06 DIAGNOSIS — I15.2 HYPERTENSION ASSOCIATED WITH DIABETES (HCC): ICD-10-CM

## 2018-11-06 DIAGNOSIS — Z12.5 PROSTATE CANCER SCREENING: ICD-10-CM

## 2018-11-06 DIAGNOSIS — Z23 REQUIRES TYPHOID VACCINATION: ICD-10-CM

## 2018-11-06 PROCEDURE — 99215 OFFICE O/P EST HI 40 MIN: CPT | Performed by: FAMILY MEDICINE

## 2018-11-06 RX ORDER — MEFLOQUINE HYDROCHLORIDE 250 MG/1
250 TABLET ORAL
Qty: 8 TAB | Refills: 0 | Status: SHIPPED | OUTPATIENT
Start: 2018-11-06 | End: 2019-01-01

## 2018-11-06 RX ORDER — UBIDECARENONE 75 MG
100 CAPSULE ORAL DAILY
COMMUNITY
End: 2020-02-12

## 2018-11-06 ASSESSMENT — PATIENT HEALTH QUESTIONNAIRE - PHQ9
4. FEELING TIRED OR HAVING LITTLE ENERGY: NOT AT ALL
7. TROUBLE CONCENTRATING ON THINGS, SUCH AS READING THE NEWSPAPER OR WATCHING TELEVISION: NOT AT ALL
3. TROUBLE FALLING OR STAYING ASLEEP OR SLEEPING TOO MUCH: NOT AT ALL
8. MOVING OR SPEAKING SO SLOWLY THAT OTHER PEOPLE COULD HAVE NOTICED. OR THE OPPOSITE, BEING SO FIGETY OR RESTLESS THAT YOU HAVE BEEN MOVING AROUND A LOT MORE THAN USUAL: NOT AT ALL
SUM OF ALL RESPONSES TO PHQ9 QUESTIONS 1 AND 2: 0
5. POOR APPETITE OR OVEREATING: NOT AT ALL
6. FEELING BAD ABOUT YOURSELF - OR THAT YOU ARE A FAILURE OR HAVE LET YOURSELF OR YOUR FAMILY DOWN: NOT AL ALL
2. FEELING DOWN, DEPRESSED, IRRITABLE, OR HOPELESS: NOT AT ALL
SUM OF ALL RESPONSES TO PHQ QUESTIONS 1-9: 0
9. THOUGHTS THAT YOU WOULD BE BETTER OFF DEAD, OR OF HURTING YOURSELF: NOT AT ALL
1. LITTLE INTEREST OR PLEASURE IN DOING THINGS: NOT AT ALL

## 2018-11-08 PROBLEM — Z23 REQUIRES TYPHOID VACCINATION: Status: RESOLVED | Noted: 2018-11-06 | Resolved: 2018-11-08

## 2018-11-08 PROBLEM — Z29.89 NEED FOR MALARIA PROPHYLAXIS: Status: RESOLVED | Noted: 2018-11-06 | Resolved: 2018-11-08

## 2018-11-08 PROBLEM — Z71.84 TRAVEL ADVICE ENCOUNTER: Status: RESOLVED | Noted: 2018-10-16 | Resolved: 2018-11-08

## 2018-11-08 NOTE — ASSESSMENT & PLAN NOTE
This is a new problem for medical evaluation, uncontrolled, patient will be traveling to a country that still has a high incidence and prevalence of typhoid.  Therefore, we will prophylactically give patient the typhoid vaccination.  Patient verbalized understanding.

## 2018-11-08 NOTE — PROGRESS NOTES
Subjective:   Chief Complaint/History of Present Illness:  Fara Chavira is a 73 y.o. male established patient who presents today to discuss medical problems as listed below    Diagnoses of Benign hemangioma -- Hepatic, Travel advice encounter, Need for malaria prophylaxis, Requires typhoid vaccination, Hyperlipidemia associated with type 2 diabetes mellitus (HCC), Uncontrolled type 2 diabetes mellitus without complication, without long-term current use of insulin (HCC), Hypertension associated with diabetes (HCC), Annual physical exam, and Prostate cancer screening were pertinent to this visit.    Benign hemangioma -- Hepatic  Chronic, stable, well-controlled.  Patient states that he would like to review the imaging for his liver.  Therefore, we reviewed the imaging studies today in detail, pointing out where mass was suspected on CT abdomen, then confirmed to likely be hemangioma on MR abdomen.      We discussed that the hemangioma is likely a benign diagnosis, and went over the signs and symptoms of hepatic failure, hepatic congestion, and or symptomatic blood loss anemia.  Patient denied those signs and symptoms, and patient verbalized understanding of the above.    Patient denies binge or regular&heavy alcohol drinking behaviors, denies IV drug use, denies recent sexual intercourse with new partners.    ROS is NEGATIVE for generalized weakness/fatigue, generalized pruritis, jaundice, RUQ pain.      Travel advice encounter  New problem for medical evaluation, uncontrolled, patient and wife are planning to travel to [place].  Therefore, we referenced the CDC yellow book for vaccine recommendations, and decided the patient should have typhoid vaccine prophylaxis, as well as malaria prophylaxis.  Please see assessment and plan as below otherwise patient was given general precautions regarding medical care in foreign countries, to seek out care as soon as possible for possible medical conditions that arise.    Need  for malaria prophylaxis  New problem, uncontrolled, patient will be traveling in [place] approximately 3 weeks, and therefore we went over that he needs to start the malaria prophylaxis 1 week prior to travel, continue it through travel, and continue it until 4 weeks after return.  This is a total of 8 weeks.  Patient verbalized understanding of these instructions, as well as signs and symptoms of malaria.    Requires typhoid vaccination  This is a new problem for medical evaluation, uncontrolled, patient will be traveling to a country that still has a high incidence and prevalence of typhoid.  Therefore, we will prophylactically give patient the typhoid vaccination.  Patient verbalized understanding.    (HCC) Uncontrolled type 2 diabetes mellitus without complication, without long-term current use of insulin  We discussed that patient is diagnosed with diabetes, uncontrolled, given that the A1c is:   Lab Results   Component Value Date/Time    HBA1C 7.8 08/02/2018 11:10 AM        Patient is currently taking (metformin, glipizide) for glycemic control, and (simvastatin, valsartan) for health maintenance related to diabetes mellitus.    We discussed that prediabetes/diabetes mellitus type 2 are medical conditions of lifestyle habits (less than optimal dietary choices, insufficient cardiovascular exercise), and that they can be controlled (in part or in whole) by these lifestyle changes.     Furthermore, we discussed that at present time, patient strongly needs to pursue lifestyle changes, and that in the future we may need to be changing medications. Patient is in agreement.     Please see assessment/plan as below for further details.    ROS is NEGATIVE for blurred vision, polydipsia, polyuria, diaphoresis, palpitations, fatigue, irritability, flank pain, BLE paresthesias.    (HCC) Hypertension associated with diabetes  Chronic, stable, well-controlled, taking medication as directed.     ROS is NEGATIVE for dizziness,  generalized weakness/fatigue, cold sweats,  vision/hearing changes, jaw pain/paresthesias, BUE pain/paresthesias/numbness/weakness, chest pain/pressure, palpitations, dyspnea, nausea, RUQ abdominal pain, oliguria/anuria, BLE edema.        Patient Active Problem List    Diagnosis Date Noted   • Benign hemangioma -- Hepatic 10/16/2018   • (HCC) Hypertension associated with diabetes 03/28/2018   • (HCC) Mood disorder 03/28/2018   • (HCC) Uncontrolled type 2 diabetes mellitus without complication, without long-term current use of insulin 02/14/2018   • (HCC) Hyperlipidemia associated with type 2 diabetes mellitus 02/14/2018   • (HCC) Mild single current episode of major depressive disorder 02/14/2018   • Nocturia more than twice per night 02/14/2018       Additional History:   Allergies:    Mushroom extract complex and Pcn [penicillins]     Current Medications:     Current Outpatient Prescriptions   Medication Sig Dispense Refill   • cyanocobalamin (VITAMIN B-12) 100 MCG Tab Take 100 mcg by mouth every day.     • cholecalciferol (VITAMIN D3) 400 UNIT Tab Take 400 Units by mouth every day.     • mefloquine (LARIAM) 250 MG tablet Take 1 Tab by mouth every 7 days for 56 days. Start 1wk prior to travel, to continue until 4wks after travel, 8wks total. 8 Tab 0   • glipiZIDE SR (GLUCOTROL) 2.5 MG TABLET SR 24 HR Take 1 Tab by mouth 2 Times a Day. 180 Tab 1   • simvastatin (ZOCOR) 20 MG Tab Take 1 Tab by mouth every evening. 90 Tab 2   • metformin (GLUCOPHAGE) 1000 MG tablet Take 1 Tab by mouth 2 Times a Day. 180 Tab 2   • FLUoxetine (PROZAC) 20 MG Cap Take 1 Cap by mouth every day. 90 Cap 3   • valsartan (DIOVAN) 40 MG Tab Take 1 Tab by mouth every day. 90 Tab 3   • ASPIRIN by Does not apply route.       No current facility-administered medications for this visit.         Social History:     Social History   Substance Use Topics   • Smoking status: Former Smoker     Quit date: 12/26/1971   • Smokeless tobacco: Never Used       "Comment: experimented in college   • Alcohol use No       ROS:     - NOTE: All other systems reviewed and are negative, except as in HPI.     Objective:   Physical Exam:    Vitals: Blood pressure 102/50, pulse 72, temperature 36.6 °C (97.8 °F), height 1.803 m (5' 10.98\"), weight 77.8 kg (171 lb 9.6 oz), SpO2 96 %.   BMI: Body mass index is 23.94 kg/m².   General/Constitutional: Vitals as above, Well nourished, well developed male in no acute distress   Head/Eyes: Head is grossly normal & atraumatic, bilateral conjunctivae clear and not injected, bilateral EOMI, bilateral PERRL   ENT: Bilateral external ears grossly normal in appearance, Hearing grossly intact, External nares normal in appearance and without discharge/bleeding   Respiratory: No respiratory distress, bilateral lungs are clear to ausculation in all lung fields (anterior/lateral/posterior), no wheezing/rhonchi/rales   Cardiovascular: Regular rate and rhythm without murmur/gallops/rubs, distal pulses are intact and equal bilaterally (radial, posterior tibial), no bilateral lower extremity edema   MSK: Gait grossly normal & not antalgic   Integumentary: No apparent rashes   Psych: Judgment grossly appropriate, no apparent depression/anxiety    Health Maintenance:     - Apart from shingles vaccination, patient's normal health maintenance topics have been reviewed and addressed    Imaging/Labs:     -I personally reviewed and interpreted the CT renal colic imaging from 10/6/2018 as well as the MR abdomen with and without contrast from 10/25/2018, with the patient, and pointed out to the patient the areas of concern for his hepatic lesion.  Patient verbalized understanding.    - 10/25/18 -- creatinine and GFR within normal limits    - 10/20/18 -- low HDL    Assessment and Plan:   1. Benign hemangioma -- Hepatic  New problem for medical evaluation, stable, well-controlled.  We may need to continue following this in the future, particularly if this is " symptomatic.    2. Travel advice encounter  New problem for medical evaluation, uncontrolled, please see assessment and plan for #3 #4 below.   - typhoid polysaccharide vaccine (TYPHIM VI) 25 MCG/0.5ML injection; 0.5 mL by Intramuscular route Once for 1 dose.  Dispense: 0.5 mL; Refill: 0   - mefloquine (LARIAM) 250 MG tablet; Take 1 Tab by mouth every 7 days for 56 days. Start 1wk prior to travel, to continue until 4wks after travel, 8wks total.  Dispense: 8 Tab; Refill: 0    3. Need for malaria prophylaxis  New problem for medical evaluation, malaria prophylaxis as below.  Country of travel ([place]) is chloroquine resistant, therefore we are choosing mefloquine.   - mefloquine (LARIAM) 250 MG tablet; Take 1 Tab by mouth every 7 days for 56 days. Start 1wk prior to travel, to continue until 4wks after travel, 8wks total.  Dispense: 8 Tab; Refill: 0    4. Requires typhoid vaccination  New problem for medical evaluation, uncontrolled, typhoid vaccine sent to pharmacy.   - typhoid polysaccharide vaccine (TYPHIM VI) 25 MCG/0.5ML injection; 0.5 mL by Intramuscular route Once for 1 dose.  Dispense: 0.5 mL; Refill: 0    5. Hyperlipidemia associated with type 2 diabetes mellitus (HCC)  Chronic, uncontrolled, patient advised to pursue lifestyle changes, particularly cardiovascular exercise and increasing proportion of plant-based nutrition.   - LIPID PROFILE; Future    6. Uncontrolled type 2 diabetes mellitus without complication, without long-term current use of insulin (HCC)  Chronic, uncontrolled, patient advised to pursue lifestyle changes, particularly cardiovascular exercise and increasing proportion of plant-based nutrition.  Continue medications as directed.   - HEMOGLOBIN A1C; Future   - LIPID PROFILE; Future   - COMP METABOLIC PANEL; Future   - MICROALBUMIN CREAT RATIO URINE; Future    7. Hypertension associated with diabetes (HCC)  Chronic, stable, well-controlled.  Continue taking medication as directed.    -  HEMOGLOBIN A1C; Future   - LIPID PROFILE; Future   - COMP METABOLIC PANEL; Future   - MICROALBUMIN CREAT RATIO URINE; Future    8. Annual physical exam  9. Prostate cancer screening   - HEMOGLOBIN A1C; Future   - LIPID PROFILE; Future   - COMP METABOLIC PANEL; Future   - PROSTATE SPECIFIC AG SCREENING; Future   - MICROALBUMIN CREAT RATIO URINE; Future    NOTE: A total of 40minutes was spent in direct face-to-face time with the patient, of which over 50% of the time was spent in counseling and/or coordination of care, particularly for discussion of travel advice/precautions/treatment.    RTC: in 3months for Annual Medical Exam.    PLEASE NOTE: This dictation was created using voice recognition software. I have made every reasonable attempt to correct obvious errors, but I expect that there are errors of grammar and possibly content that I did not discover before finalizing the note.

## 2018-11-08 NOTE — ASSESSMENT & PLAN NOTE
Chronic, stable, well-controlled.  Patient states that he would like to review the imaging for his liver.  Therefore, we reviewed the imaging studies today in detail, pointing out where mass was suspected on CT abdomen, then confirmed to likely be hemangioma on MR abdomen.      We discussed that the hemangioma is likely a benign diagnosis, and went over the signs and symptoms of hepatic failure, hepatic congestion, and or symptomatic blood loss anemia.  Patient denied those signs and symptoms, and patient verbalized understanding of the above.    Patient denies binge or regular&heavy alcohol drinking behaviors, denies IV drug use, denies recent sexual intercourse with new partners.    ROS is NEGATIVE for generalized weakness/fatigue, generalized pruritis, jaundice, RUQ pain.

## 2018-11-08 NOTE — ASSESSMENT & PLAN NOTE
New problem for medical evaluation, uncontrolled, patient and wife are planning to travel to [place].  Therefore, we referenced the CDC yellow book for vaccine recommendations, and decided the patient should have typhoid vaccine prophylaxis, as well as malaria prophylaxis.  Please see assessment and plan as below otherwise patient was given general precautions regarding medical care in foreign countries, to seek out care as soon as possible for possible medical conditions that arise.

## 2018-11-08 NOTE — ASSESSMENT & PLAN NOTE
New problem, uncontrolled, patient will be traveling in [place] approximately 3 weeks, and therefore we went over that he needs to start the malaria prophylaxis 1 week prior to travel, continue it through travel, and continue it until 4 weeks after return.  This is a total of 8 weeks.  Patient verbalized understanding of these instructions, as well as signs and symptoms of malaria.

## 2018-11-08 NOTE — ASSESSMENT & PLAN NOTE
We discussed that patient is diagnosed with diabetes, uncontrolled, given that the A1c is:   Lab Results   Component Value Date/Time    HBA1C 7.8 08/02/2018 11:10 AM        Patient is currently taking (metformin, glipizide) for glycemic control, and (simvastatin, valsartan) for health maintenance related to diabetes mellitus.    We discussed that prediabetes/diabetes mellitus type 2 are medical conditions of lifestyle habits (less than optimal dietary choices, insufficient cardiovascular exercise), and that they can be controlled (in part or in whole) by these lifestyle changes.     Furthermore, we discussed that at present time, patient strongly needs to pursue lifestyle changes, and that in the future we may need to be changing medications. Patient is in agreement.     Please see assessment/plan as below for further details.    ROS is NEGATIVE for blurred vision, polydipsia, polyuria, diaphoresis, palpitations, fatigue, irritability, flank pain, BLE paresthesias.

## 2018-11-13 ENCOUNTER — HOSPITAL ENCOUNTER (OUTPATIENT)
Facility: MEDICAL CENTER | Age: 73
End: 2018-11-13
Attending: PHYSICIAN ASSISTANT
Payer: COMMERCIAL

## 2018-11-13 ENCOUNTER — OFFICE VISIT (OUTPATIENT)
Dept: URGENT CARE | Facility: CLINIC | Age: 73
End: 2018-11-13
Payer: COMMERCIAL

## 2018-11-13 VITALS
DIASTOLIC BLOOD PRESSURE: 60 MMHG | RESPIRATION RATE: 18 BRPM | HEART RATE: 84 BPM | TEMPERATURE: 97.7 F | BODY MASS INDEX: 24.72 KG/M2 | OXYGEN SATURATION: 100 % | HEIGHT: 71 IN | SYSTOLIC BLOOD PRESSURE: 110 MMHG | WEIGHT: 176.6 LBS

## 2018-11-13 DIAGNOSIS — N39.0 URINARY TRACT INFECTION WITHOUT HEMATURIA, SITE UNSPECIFIED: ICD-10-CM

## 2018-11-13 DIAGNOSIS — E11.8 TYPE 2 DIABETES MELLITUS WITH COMPLICATION, UNSPECIFIED WHETHER LONG TERM INSULIN USE: ICD-10-CM

## 2018-11-13 DIAGNOSIS — R35.0 URINARY FREQUENCY: ICD-10-CM

## 2018-11-13 LAB
APPEARANCE UR: CLEAR
BILIRUB UR STRIP-MCNC: NEGATIVE MG/DL
COLOR UR AUTO: NORMAL
GLUCOSE BLD-MCNC: 130 MG/DL (ref 70–100)
GLUCOSE UR STRIP.AUTO-MCNC: NEGATIVE MG/DL
HBA1C MFR BLD: 8 % (ref ?–5.8)
INT CON NEG: NEGATIVE
INT CON POS: POSITIVE
KETONES UR STRIP.AUTO-MCNC: NEGATIVE MG/DL
LEUKOCYTE ESTERASE UR QL STRIP.AUTO: NEGATIVE
NITRITE UR QL STRIP.AUTO: POSITIVE
PH UR STRIP.AUTO: 6.5 [PH] (ref 5–8)
PROT UR QL STRIP: NORMAL MG/DL
RBC UR QL AUTO: NEGATIVE
SP GR UR STRIP.AUTO: 1.02
UROBILINOGEN UR STRIP-MCNC: 0.2 MG/DL

## 2018-11-13 PROCEDURE — 99214 OFFICE O/P EST MOD 30 MIN: CPT | Performed by: PHYSICIAN ASSISTANT

## 2018-11-13 PROCEDURE — 82962 GLUCOSE BLOOD TEST: CPT | Performed by: PHYSICIAN ASSISTANT

## 2018-11-13 PROCEDURE — 83036 HEMOGLOBIN GLYCOSYLATED A1C: CPT | Performed by: PHYSICIAN ASSISTANT

## 2018-11-13 PROCEDURE — 87086 URINE CULTURE/COLONY COUNT: CPT

## 2018-11-13 PROCEDURE — 81002 URINALYSIS NONAUTO W/O SCOPE: CPT | Performed by: PHYSICIAN ASSISTANT

## 2018-11-13 RX ORDER — SULFAMETHOXAZOLE AND TRIMETHOPRIM 800; 160 MG/1; MG/1
1 TABLET ORAL EVERY 12 HOURS
Qty: 20 TAB | Refills: 0 | Status: SHIPPED | OUTPATIENT
Start: 2018-11-13 | End: 2018-11-23

## 2018-11-13 ASSESSMENT — ENCOUNTER SYMPTOMS
DIARRHEA: 1
SHORTNESS OF BREATH: 0
FEVER: 0
EYE DISCHARGE: 0
SORE THROAT: 1
FALLS: 0
WHEEZING: 0
COUGH: 1
FATIGUE: 1
VOMITING: 0
FLANK PAIN: 0
ABDOMINAL PAIN: 0
EYE REDNESS: 0

## 2018-11-13 NOTE — PROGRESS NOTES
"Subjective:      Fara Chavira is a 73 y.o. male who presents with Urinary Frequency (x4day, frequent urinaiton, soft stool, fatigue, weak)            Patient is a pleasant 73-year-old male who presents to urgent care with urinary frequency, fatigue for the last 4 days.  Patient was recently in [place] visiting family when he [facility] of which they felt that he needed further evaluation and sent him to a local urgent care.  The urgent care reported that \"he was without infection \".  They wrote him for Pyridium of which has not seemed to help with his urinary frequency.  He does admit that the last day and a half he has been having dysuria as well.  He denies any abdominal pain or back pain.  Patient denies any fevers but does report chills and intermittent body aches.  Lastly patient reports a slight sore throat a few days ago with a dry cough of which both seem to be improving.  On further discussion patient does have a history of diabetes of which he is not checking his sugars at home.  He does report being compliant with his Metformin and glipizide.  Lastly 3-4 days ago he had slight loose stools of which this did improve in the last 1-2 days.      Urinary Frequency   This is a new problem. The current episode started in the past 7 days. The problem occurs constantly. The problem has been unchanged. Associated symptoms include coughing, fatigue, a sore throat and urinary symptoms. Pertinent negatives include no abdominal pain, congestion, fever, rash or vomiting. Nothing aggravates the symptoms. Treatments tried: Pyridium. The treatment provided no relief.       Review of Systems   Constitutional: Positive for fatigue and malaise/fatigue. Negative for fever.   HENT: Positive for sore throat. Negative for congestion and ear pain.    Eyes: Negative for discharge and redness.   Respiratory: Positive for cough. Negative for shortness of breath and wheezing.    Gastrointestinal: Positive " "for diarrhea. Negative for abdominal pain and vomiting.   Genitourinary: Positive for dysuria, frequency and urgency. Negative for flank pain and hematuria.   Musculoskeletal: Negative for falls and joint pain.   Skin: Negative for itching and rash.          Objective:     /60 (BP Location: Left arm, Patient Position: Sitting, BP Cuff Size: Adult)   Pulse 84   Temp 36.5 °C (97.7 °F) (Temporal)   Resp 18   Ht 1.803 m (5' 11\")   Wt 80.1 kg (176 lb 9.6 oz)   SpO2 100%   BMI 24.63 kg/m²    PMH:  has a past medical history of Diabetes (HCC).  MEDS:   Current Outpatient Prescriptions:   •  sulfamethoxazole-trimethoprim (BACTRIM DS) 800-160 MG tablet, Take 1 Tab by mouth every 12 hours for 10 days., Disp: 20 Tab, Rfl: 0  •  cyanocobalamin (VITAMIN B-12) 100 MCG Tab, Take 100 mcg by mouth every day., Disp: , Rfl:   •  cholecalciferol (VITAMIN D3) 400 UNIT Tab, Take 400 Units by mouth every day., Disp: , Rfl:   •  mefloquine (LARIAM) 250 MG tablet, Take 1 Tab by mouth every 7 days for 56 days. Start 1wk prior to travel, to continue until 4wks after travel, 8wks total., Disp: 8 Tab, Rfl: 0  •  glipiZIDE SR (GLUCOTROL) 2.5 MG TABLET SR 24 HR, Take 1 Tab by mouth 2 Times a Day., Disp: 180 Tab, Rfl: 1  •  simvastatin (ZOCOR) 20 MG Tab, Take 1 Tab by mouth every evening., Disp: 90 Tab, Rfl: 2  •  metformin (GLUCOPHAGE) 1000 MG tablet, Take 1 Tab by mouth 2 Times a Day., Disp: 180 Tab, Rfl: 2  •  FLUoxetine (PROZAC) 20 MG Cap, Take 1 Cap by mouth every day., Disp: 90 Cap, Rfl: 3  •  valsartan (DIOVAN) 40 MG Tab, Take 1 Tab by mouth every day., Disp: 90 Tab, Rfl: 3  •  ASPIRIN, by Does not apply route., Disp: , Rfl:   ALLERGIES:   Allergies   Allergen Reactions   • Mushroom Extract Complex Vomiting   • Pcn [Penicillins] Rash     On chest     SURGHX:   Past Surgical History:   Procedure Laterality Date   • OTHER ABDOMINAL SURGERY      Bowel surgery d/t intusussception (doesn't know LI vs. SI, no GI complaints now) "     SOCHX:  reports that he quit smoking about 46 years ago. He has never used smokeless tobacco. He reports that he does not drink alcohol or use drugs.  FH: Family history was reviewed, no pertinent findings to report    Physical Exam   Constitutional: He is oriented to person, place, and time. He appears well-developed and well-nourished. No distress.   HENT:   Head: Normocephalic and atraumatic.   Right Ear: External ear normal.   Left Ear: External ear normal.   Mouth/Throat: Oropharynx is clear and moist. No oropharyngeal exudate.   Eyes: Pupils are equal, round, and reactive to light. Conjunctivae and EOM are normal.   Neck: Normal range of motion. Neck supple. No tracheal deviation present.   Cardiovascular: Normal rate and regular rhythm.    No murmur heard.  Pulmonary/Chest: Effort normal and breath sounds normal. No respiratory distress.   Abdominal:   Negative CVAT.   Musculoskeletal: Normal range of motion. He exhibits no edema.   Neurological: He is alert and oriented to person, place, and time. Coordination normal.   Skin: Skin is warm. No rash noted.   Psychiatric: He has a normal mood and affect. His behavior is normal. Judgment and thought content normal.   Vitals reviewed.            pos nitrite, trace protein, trace blood.   Sent for culture.     Assessment/Plan:     1. Urinary tract infection without hematuria, site unspecified  - Urine Culture; Future  - sulfamethoxazole-trimethoprim (BACTRIM DS) 800-160 MG tablet; Take 1 Tab by mouth every 12 hours for 10 days.  Dispense: 20 Tab; Refill: 0    2. Urinary frequency  - POCT Urinalysis  - POCT Glucose  - POCT  A1C  - Urine Culture; Future    3. Type 2 diabetes mellitus with complication, unspecified whether long term insulin use (HCC)  - POCT Glucose  - POCT  A1C    Glucose in clinic is 130, A1c is 8-strongly encourage compliance of medications, utilizing his glucometer at home as well.  Reiterated diet changes and importance of increase water.   Patient is to stop the Pyridium at this time.Pt. Was given ABX therapy today and will change therapy if culture indicates this is necessary. ER precautions given- worsening symptoms, back pain, abd. Pain, or fevers.   Pt. Is to increase fluids, and take the complete duration of the therapy.   Patient given precautionary s/sx that mandate immediate follow up and evaluation in the ED. Advised of risks of not doing so.    DDX, Supportive care, and indications for immediate follow-up discussed with patient.    Instructed to return to clinic or nearest emergency department if we are not available for any change in condition, further concerns, or worsening of symptoms.    The patient demonstrated a good understanding and agreed with the treatment plan.  Please note that this dictation was created using voice recognition software. I have made every reasonable attempt to correct obvious errors, but I expect that there are errors of grammar and possibly content that I did not discover before finalizing the note.

## 2018-11-14 DIAGNOSIS — N39.0 URINARY TRACT INFECTION WITHOUT HEMATURIA, SITE UNSPECIFIED: ICD-10-CM

## 2018-11-14 DIAGNOSIS — R35.0 URINARY FREQUENCY: ICD-10-CM

## 2018-11-16 LAB
BACTERIA UR CULT: NORMAL
SIGNIFICANT IND 70042: NORMAL
SITE SITE: NORMAL
SOURCE SOURCE: NORMAL

## 2018-11-19 ENCOUNTER — TELEPHONE (OUTPATIENT)
Dept: MEDICAL GROUP | Facility: MEDICAL CENTER | Age: 73
End: 2018-11-19

## 2018-11-19 NOTE — TELEPHONE ENCOUNTER
VOICEMAIL  1. Caller Name: Fara Chavira                        Call Back Number: [phone] (home) [phone] (work)      2. Message: Pt is calling because he has diane having to urinate every 30 minutes and it is painful. And has already been prescribed anti biotics (not UTI) and he says he did research on the Internet and it told him he has an enlarged prostate and needs a urgent referral to urology.     3. Patient approves office to leave a detailed voicemail/MyChart message: N\A

## 2018-11-20 NOTE — TELEPHONE ENCOUNTER
Patient needs to get bloodwork, as he and I had ordered on 11/06/18.  If the prostate lab result is elevated, I can place the referral to Urology.

## 2018-11-21 ENCOUNTER — PATIENT MESSAGE (OUTPATIENT)
Dept: MEDICAL GROUP | Facility: MEDICAL CENTER | Age: 73
End: 2018-11-21

## 2018-11-26 ENCOUNTER — TELEPHONE (OUTPATIENT)
Dept: MEDICAL GROUP | Facility: MEDICAL CENTER | Age: 73
End: 2018-11-26

## 2018-11-26 NOTE — TELEPHONE ENCOUNTER
----- Message from Jf Bryant, Med Ass't sent at 11/26/2018  7:54 AM PST -----      ----- Message -----  From: Genevieve Freeman  Sent: 11/26/2018   6:42 AM  To: [facility]    1. Caller Name: Fara Chavira                            2. Call Back Number: [phone] (home) [phone] (work)      3. Patient PCP: Dr. Jett    4. What is the patient requesting: Patient left voicemail on Friday that he need to be seen by Dr. Jett urgently. We did attempt to call him last week on 3 different phone numbers and also sent a mychart asking him to come in last week but he did not show. Please ask Dr. jett what we can do for him. Thank you    5. Does patient need immediate contact:yes

## 2018-11-26 NOTE — TELEPHONE ENCOUNTER
If patient cannot be scheduled with me, then please help him get scheduled with any PCP, otherwise he can go to Urgent Care.

## 2018-11-27 ENCOUNTER — HOSPITAL ENCOUNTER (OUTPATIENT)
Facility: MEDICAL CENTER | Age: 73
End: 2018-11-27
Attending: PHYSICIAN ASSISTANT
Payer: COMMERCIAL

## 2018-11-27 ENCOUNTER — OFFICE VISIT (OUTPATIENT)
Dept: MEDICAL GROUP | Facility: MEDICAL CENTER | Age: 73
End: 2018-11-27
Payer: COMMERCIAL

## 2018-11-27 VITALS
BODY MASS INDEX: 23.6 KG/M2 | TEMPERATURE: 97.2 F | OXYGEN SATURATION: 95 % | HEIGHT: 71 IN | SYSTOLIC BLOOD PRESSURE: 100 MMHG | DIASTOLIC BLOOD PRESSURE: 66 MMHG | HEART RATE: 75 BPM | WEIGHT: 168.6 LBS

## 2018-11-27 DIAGNOSIS — R35.1 NOCTURIA MORE THAN TWICE PER NIGHT: ICD-10-CM

## 2018-11-27 DIAGNOSIS — R35.0 URINARY FREQUENCY: ICD-10-CM

## 2018-11-27 LAB
APPEARANCE UR: NORMAL
BILIRUB UR STRIP-MCNC: NORMAL MG/DL
COLOR UR AUTO: NORMAL
GLUCOSE UR STRIP.AUTO-MCNC: NORMAL MG/DL
KETONES UR STRIP.AUTO-MCNC: NORMAL MG/DL
LEUKOCYTE ESTERASE UR QL STRIP.AUTO: NORMAL
NITRITE UR QL STRIP.AUTO: NORMAL
PH UR STRIP.AUTO: 6.5 [PH] (ref 5–8)
PROT UR QL STRIP: NORMAL MG/DL
RBC UR QL AUTO: NORMAL
SP GR UR STRIP.AUTO: 1.02
UROBILINOGEN UR STRIP-MCNC: 1 MG/DL

## 2018-11-27 PROCEDURE — 87086 URINE CULTURE/COLONY COUNT: CPT

## 2018-11-27 PROCEDURE — 99214 OFFICE O/P EST MOD 30 MIN: CPT | Performed by: PHYSICIAN ASSISTANT

## 2018-11-27 PROCEDURE — 81002 URINALYSIS NONAUTO W/O SCOPE: CPT | Performed by: PHYSICIAN ASSISTANT

## 2018-11-27 RX ORDER — CIPROFLOXACIN 500 MG/1
500 TABLET, FILM COATED ORAL 2 TIMES DAILY
Qty: 14 TAB | Refills: 0 | Status: SHIPPED | OUTPATIENT
Start: 2018-11-27 | End: 2018-12-04

## 2018-11-27 NOTE — PROGRESS NOTES
Subjective:   Fara Chavira is a 73 y.o. male here today for urinary frequency for 1 month and diabetes.    Urinary frequency  This is a 73-year-old male complains of at least a 1 month history of increased urinary frequency.  Appears to be going every half hour.  Worse when drinking fluids.  The other day he was sick with a viral gastroenteritis and is now better but during that time his urinary frequency was less.  During that time was not drinking as much fluids.  Also at nighttime he is getting up to urinate more frequently.  He was seen at urgent care and diagnosed with a suspected UTI.  Was placed on Bactrim.  Medication did improve his condition.  He was found to have mixed hadley 10,000-50,000 in his urine.  Last PSA was done in February and it was at 0.7.  The past he had elevated levels.  Denies any significant burning urination.  Denies any dribbling.  Denies any incontinence.  Denies hematuria.  He has an appointment next week with urology [place].    (HCC) Uncontrolled type 2 diabetes mellitus without complication, without long-term current use of insulin  Recent A1c at 8.0.  He is on metformin thousand milligrams twice a day.  Also taking glipizide 2.5 mg twice a day.  Wife sees an endocrinologist.  She would like him to see an endocrinologist be placed on other medications.       Current medicines (including changes today)  Current Outpatient Prescriptions   Medication Sig Dispense Refill   • ciprofloxacin (CIPRO) 500 MG Tab Take 1 Tab by mouth 2 times a day for 7 days. 14 Tab 0   • cyanocobalamin (VITAMIN B-12) 100 MCG Tab Take 100 mcg by mouth every day.     • cholecalciferol (VITAMIN D3) 400 UNIT Tab Take 400 Units by mouth every day.     • mefloquine (LARIAM) 250 MG tablet Take 1 Tab by mouth every 7 days for 56 days. Start 1wk prior to travel, to continue until 4wks after travel, 8wks total. 8 Tab 0   • glipiZIDE SR (GLUCOTROL) 2.5 MG TABLET SR 24 HR Take 1 Tab by mouth 2 Times a Day. 180 Tab 1  "  • simvastatin (ZOCOR) 20 MG Tab Take 1 Tab by mouth every evening. 90 Tab 2   • metformin (GLUCOPHAGE) 1000 MG tablet Take 1 Tab by mouth 2 Times a Day. 180 Tab 2   • FLUoxetine (PROZAC) 20 MG Cap Take 1 Cap by mouth every day. 90 Cap 3   • valsartan (DIOVAN) 40 MG Tab Take 1 Tab by mouth every day. 90 Tab 3   • ASPIRIN by Does not apply route.       No current facility-administered medications for this visit.      He  has a past medical history of Diabetes (HCC).    Social History and Family History were reviewed and updated.    ROS   No chest pain, no shortness of breath, no abdominal pain and all other systems were reviewed and are negative.       Objective:     Blood pressure 100/66, pulse 75, temperature 36.2 °C (97.2 °F), height 1.803 m (5' 11\"), weight 76.5 kg (168 lb 9.6 oz), SpO2 95 %. Body mass index is 23.51 kg/m².   Physical Exam:  Constitutional: Alert, no distress.  Skin: Warm, dry, good turgor, no rashes in visible areas.  Eye: Equal, round and reactive, conjunctiva clear, lids normal.  ENMT: Lips without lesions, good dentition, oropharynx clear.  Neck: Trachea midline, no masses.   Lymph: No cervical or supraclavicular lymphadenopathy  Respiratory: Unlabored respiratory effort, lungs clear to auscultation, no wheezes, no ronchi.  Cardiovascular: Normal S1, S2, no murmur, no edema.  Abdomen: Soft, non-tender, no masses.  Psych: Alert and oriented x3, normal affect and mood.    Urinalysis unremarkable.    Assessment and Plan:   The following treatment plan was discussed    1. Urinary frequency  Acute, new onset condition.  Ordered PSA diagnostically.  Provided Cipro empirically.  Send off urine for culture.  Advised to follow with urology next week.  Discussed other medications but will defer to urology.  - POCT Urinalysis  - URINE CULTURE(NEW); Future  - PROSTATE SPECIFIC AG DIAGNOSTIC; Future  - ciprofloxacin (CIPRO) 500 MG Tab; Take 1 Tab by mouth 2 times a day for 7 days.  Dispense: 14 Tab; " Refill: 0    2. Nocturia more than twice per night  Follow-up with urology for an appointment.  - ciprofloxacin (CIPRO) 500 MG Tab; Take 1 Tab by mouth 2 times a day for 7 days.  Dispense: 14 Tab; Refill: 0    3. (HCC) Uncontrolled type 2 diabetes mellitus without complication, without long-term current use of insulin  Chronic condition.  Last A1c at 8.0.  Uncontrolled.  Refer to endocrinology per wife's wishes.  Continue medications as directed.  - REFERRAL TO ENDOCRINOLOGY    Patient was seen for 25 minutes face to face of which > 50% of appointment time was spent on counseling and coordination of care regarding the above.      Followup: Return if symptoms worsen or fail to improve.    Please note that this dictation was created using voice recognition software. I have made every reasonable attempt to correct obvious errors, but I expect that there are errors of grammar and possibly content that I did not discover before finalizing the note.

## 2018-11-27 NOTE — ASSESSMENT & PLAN NOTE
This is a 73-year-old male complains of at least a 1 month history of increased urinary frequency.  Appears to be going every half hour.  Worse when drinking fluids.  The other day he was sick with a viral gastroenteritis and is now better but during that time his urinary frequency was less.  During that time was not drinking as much fluids.  Also at nighttime he is getting up to urinate more frequently.  He was seen at urgent care and diagnosed with a suspected UTI.  Was placed on Bactrim.  Medication did improve his condition.  He was found to have mixed hadley 10,000-50,000 in his urine.  Last PSA was done in February and it was at 0.7.  The past he had elevated levels.  Denies any significant burning urination.  Denies any dribbling.  Denies any incontinence.  Denies hematuria.  He has an appointment next week with urology [place].

## 2018-11-27 NOTE — ASSESSMENT & PLAN NOTE
Recent A1c at 8.0.  He is on metformin thousand milligrams twice a day.  Also taking glipizide 2.5 mg twice a day.  Wife sees an endocrinologist.  She would like him to see an endocrinologist be placed on other medications.

## 2018-11-29 LAB
BACTERIA UR CULT: NORMAL
SIGNIFICANT IND 70042: NORMAL
SITE SITE: NORMAL
SOURCE SOURCE: NORMAL

## 2018-12-03 ENCOUNTER — HOSPITAL ENCOUNTER (OUTPATIENT)
Dept: LAB | Facility: MEDICAL CENTER | Age: 73
End: 2018-12-03
Attending: PHYSICIAN ASSISTANT
Payer: COMMERCIAL

## 2018-12-03 PROCEDURE — 84153 ASSAY OF PSA TOTAL: CPT

## 2018-12-03 PROCEDURE — 36415 COLL VENOUS BLD VENIPUNCTURE: CPT

## 2018-12-04 LAB — PSA SERPL-MCNC: 0.68 NG/ML (ref 0–4)

## 2019-02-06 ENCOUNTER — APPOINTMENT (OUTPATIENT)
Dept: MEDICAL GROUP | Facility: MEDICAL CENTER | Age: 74
End: 2019-02-06
Payer: MEDICARE

## 2019-04-16 DIAGNOSIS — F39 MOOD DISORDER (HCC): ICD-10-CM

## 2019-04-16 DIAGNOSIS — E78.5 HYPERLIPIDEMIA ASSOCIATED WITH TYPE 2 DIABETES MELLITUS (HCC): ICD-10-CM

## 2019-04-16 DIAGNOSIS — I10 ESSENTIAL HYPERTENSION: ICD-10-CM

## 2019-04-16 DIAGNOSIS — E11.69 HYPERLIPIDEMIA ASSOCIATED WITH TYPE 2 DIABETES MELLITUS (HCC): ICD-10-CM

## 2019-04-17 RX ORDER — FLUOXETINE HYDROCHLORIDE 20 MG/1
CAPSULE ORAL
Qty: 90 CAP | Refills: 1 | Status: SHIPPED | OUTPATIENT
Start: 2019-04-17 | End: 2019-10-30 | Stop reason: SDUPTHER

## 2019-04-17 RX ORDER — VALSARTAN 40 MG/1
TABLET ORAL
Qty: 90 TAB | Refills: 1 | Status: SHIPPED | OUTPATIENT
Start: 2019-04-17 | End: 2019-08-06

## 2019-04-17 RX ORDER — SIMVASTATIN 20 MG
TABLET ORAL
Qty: 90 TAB | Refills: 1 | Status: SHIPPED | OUTPATIENT
Start: 2019-04-17 | End: 2019-12-06 | Stop reason: SDUPTHER

## 2019-04-17 RX ORDER — GLIPIZIDE 2.5 MG/1
TABLET, EXTENDED RELEASE ORAL
Qty: 180 TAB | Refills: 1 | Status: SHIPPED | OUTPATIENT
Start: 2019-04-17 | End: 2019-05-06

## 2019-05-06 ENCOUNTER — OFFICE VISIT (OUTPATIENT)
Dept: ENDOCRINOLOGY | Facility: MEDICAL CENTER | Age: 74
End: 2019-05-06
Payer: COMMERCIAL

## 2019-05-06 VITALS
WEIGHT: 185 LBS | OXYGEN SATURATION: 98 % | BODY MASS INDEX: 25.9 KG/M2 | DIASTOLIC BLOOD PRESSURE: 77 MMHG | SYSTOLIC BLOOD PRESSURE: 110 MMHG | HEART RATE: 77 BPM | HEIGHT: 71 IN

## 2019-05-06 DIAGNOSIS — E11.59 HYPERTENSION ASSOCIATED WITH DIABETES (HCC): ICD-10-CM

## 2019-05-06 DIAGNOSIS — I15.2 HYPERTENSION ASSOCIATED WITH DIABETES (HCC): ICD-10-CM

## 2019-05-06 LAB
HBA1C MFR BLD: 7.1 % (ref 0–5.6)
INT CON NEG: NEGATIVE
INT CON POS: POSITIVE

## 2019-05-06 PROCEDURE — 83036 HEMOGLOBIN GLYCOSYLATED A1C: CPT | Performed by: PHYSICIAN ASSISTANT

## 2019-05-06 PROCEDURE — 99204 OFFICE O/P NEW MOD 45 MIN: CPT | Performed by: PHYSICIAN ASSISTANT

## 2019-05-06 RX ORDER — FLASH GLUCOSE SENSOR
1 KIT MISCELLANEOUS
Qty: 2 EACH | Refills: 11 | Status: SHIPPED | OUTPATIENT
Start: 2019-05-06 | End: 2019-11-07

## 2019-05-06 RX ORDER — FLASH GLUCOSE SENSOR
1 KIT MISCELLANEOUS
Qty: 1 DEVICE | Refills: 1 | Status: SHIPPED | OUTPATIENT
Start: 2019-05-06 | End: 2019-11-07

## 2019-05-06 NOTE — PROGRESS NOTES
New Patient Consult Note  Referred by: Neymar Klein M.D.    Reason for consult: Diabetes Management Type 2    HPI:  Fara Chavira is a 73 y.o. old patient who is seeing us today for diabetes care.  This is a pleasant patient with diabetes and I appreciate the opportunity to participate in the care of this patient.  This is a new patient with me today.    Labs of 5/6/19 HbA1c is 7.1  Labs of  11/13/18 HbA1c 8.0, microalbumin negative    BG Diary:5/6/2019  In the AM: no log    Has been Diabetic since T2 5 + years  Has a Glucagon pen at home: no    1. (HCC) Uncontrolled type 2 diabetes mellitus without complication, without long-term current use of insulin      2. (HCC) Hypertension associated with diabetes    This is a new patient with me on 5/6/2019  They are on:  1.  Glipizide  2.  Metformin    STOP:  1.  Glipizide    START:  1.  Synjardy 12.5/1000 once in the AM and once in the PM    ROS:   Constitutional: No change in weight , No fatigue, No night sweats.  HEENT: No Headache.  Eyes:  No blurred vision, No visual changes.  Cardiac: No chest pain, No palpitations.  Resp: No shortness of breath, No cough,   Gastro: No nausea or vomiting, No diarrhea.  Neuro: Denies numbness or tinging in bilateral feet or hands, and no loss of sensation.  Endo: No heat or cold intolerance.  : No polyuria, No polydipsia, No chronic UTI's.  Lower extremities: No lower leg edema bilateral.  All other systems were reviewed and were negative.    Past Medical History:  Patient Active Problem List    Diagnosis Date Noted   • Urinary frequency 11/27/2018   • Benign hemangioma -- Hepatic 10/16/2018   • (HCC) Hypertension associated with diabetes 03/28/2018   • (HCC) Mood disorder 03/28/2018   • (HCC) Uncontrolled type 2 diabetes mellitus without complication, without long-term current use of insulin 02/14/2018   • (HCC) Hyperlipidemia associated with type 2 diabetes mellitus 02/14/2018   • (HCC) Mild single current episode of major  depressive disorder 02/14/2018   • Nocturia more than twice per night 02/14/2018       Past Surgical History:  Past Surgical History:   Procedure Laterality Date   • OTHER ABDOMINAL SURGERY      Bowel surgery d/t intusussception (doesn't know LI vs. SI, no GI complaints now)       Allergies:  Mushroom extract complex and Pcn [penicillins]    Social History:  Social History     Social History   • Marital status:      Spouse name: N/A   • Number of children: N/A   • Years of education: N/A     Occupational History   • Not on file.     Social History Main Topics   • Smoking status: Former Smoker     Quit date: 12/26/1971   • Smokeless tobacco: Never Used      Comment: experimented in college   • Alcohol use No   • Drug use: No   • Sexual activity: Yes     Partners: Female     Birth control/ protection: Post-Menopausal     Other Topics Concern   • Not on file     Social History Narrative   • No narrative on file       Family History:  Family History   Problem Relation Age of Onset   • Diabetes Brother    • Kidney Disease Brother    • Diabetes Sister        Medications:    Current Outpatient Prescriptions:   •  simvastatin (ZOCOR) 20 MG Tab, TAKE ONE TABLET BY MOUTH EVERY EVENING, Disp: 90 Tab, Rfl: 1  •  FLUoxetine (PROZAC) 20 MG Cap, TAKE ONE CAPSULE BY MOUTH EVERY DAY, Disp: 90 Cap, Rfl: 1  •  valsartan (DIOVAN) 40 MG Tab, TAKE ONE TABLET BY MOUTH EVERY DAY, Disp: 90 Tab, Rfl: 1  •  glipiZIDE SR (GLUCOTROL) 2.5 MG TABLET SR 24 HR, TAKE ONE TABLET BY MOUTH TWICE A DAY, Disp: 180 Tab, Rfl: 1  •  cyanocobalamin (VITAMIN B-12) 100 MCG Tab, Take 100 mcg by mouth every day., Disp: , Rfl:   •  cholecalciferol (VITAMIN D3) 400 UNIT Tab, Take 400 Units by mouth every day., Disp: , Rfl:   •  metformin (GLUCOPHAGE) 1000 MG tablet, Take 1 Tab by mouth 2 Times a Day., Disp: 180 Tab, Rfl: 2  •  ASPIRIN, by Does not apply route., Disp: , Rfl:       Physical Examination:   Vital signs: /77 (BP Location: Left arm, Patient  "Position: Sitting)   Pulse 77   Ht 1.803 m (5' 11\")   Wt 83.9 kg (185 lb)   SpO2 98%   BMI 25.80 kg/m²   General: No distress, cooperative, well dressed and well nourished.   Eyes: No scleral icterus or discharge, No hyposphagma  ENMT: Normal on external inspection of nose, lips, No nasal drainage   Neck: No abnormal masses on inspection  Resp: Normal effort, Bilateral clear to auscultation, No wheezing, No rales  CVS: Regular rate and rhythm, S1 S2 normal, No murmur. No gallop  Extremities: No edema bilateral extremities  Neuro: Alert and oriented  Skin: No rash, No Ulcers  Psych: Normal mood and affect    Assessment and Plan:    1. (HCC) Uncontrolled type 2 diabetes mellitus without complication, without long-term current use of insulin  STOP:  1.  Glipizide  2.  Metformin    START:  1.  Synjardy 12.5/1000 once in the AM and once in the PM    2. (HCC) Hypertension associated with diabetes  This is stable today and no new changes are needed or required in today's visit    Return in about 3 months (around 8/6/2019).    This patient during there office visit was started on new medication Synjardy.  Side effects of new medications were discussed with the patient today in the office. The patient was supplied paperwork on this new medication.    Thank you kindly for allowing me to participate in the diabetes care plan for this patient.    Shiv Garza PA-C, BC-ADM  Board Certified - [facility]  05/06/19    CC:   Neymar Klein M.D.    "

## 2019-05-07 ENCOUNTER — TELEPHONE (OUTPATIENT)
Dept: ENDOCRINOLOGY | Facility: MEDICAL CENTER | Age: 74
End: 2019-05-07

## 2019-05-07 NOTE — TELEPHONE ENCOUNTER
1. Caller Name: Chelly                                             Call Back Number: [phone]    Patient approves a detailed voicemail message: yes    Chelly called from [facility]'s pharmacy wanting to confirm you want to start patient on Synjardy 12.5-1000mg. In their records it shows this will be his first time and normally the starting dose is the 10-1000mg. I did tell her from you last note you did want him to start on the 12.-1000mg. She still wanted me to run it through you.    Please advise

## 2019-08-06 ENCOUNTER — OFFICE VISIT (OUTPATIENT)
Dept: ENDOCRINOLOGY | Facility: MEDICAL CENTER | Age: 74
End: 2019-08-06
Payer: COMMERCIAL

## 2019-08-06 VITALS
WEIGHT: 176 LBS | BODY MASS INDEX: 24.64 KG/M2 | SYSTOLIC BLOOD PRESSURE: 106 MMHG | HEART RATE: 59 BPM | OXYGEN SATURATION: 97 % | HEIGHT: 71 IN | DIASTOLIC BLOOD PRESSURE: 56 MMHG

## 2019-08-06 DIAGNOSIS — I15.2 HYPERTENSION ASSOCIATED WITH DIABETES (HCC): ICD-10-CM

## 2019-08-06 DIAGNOSIS — E11.59 HYPERTENSION ASSOCIATED WITH DIABETES (HCC): ICD-10-CM

## 2019-08-06 LAB
HBA1C MFR BLD: 7.9 % (ref 0–5.6)
INT CON NEG: NEGATIVE
INT CON POS: POSITIVE

## 2019-08-06 PROCEDURE — 99214 OFFICE O/P EST MOD 30 MIN: CPT | Performed by: PHYSICIAN ASSISTANT

## 2019-08-06 PROCEDURE — 83036 HEMOGLOBIN GLYCOSYLATED A1C: CPT | Performed by: PHYSICIAN ASSISTANT

## 2019-08-06 RX ORDER — ALFUZOSIN HYDROCHLORIDE 10 MG/1
10 TABLET, EXTENDED RELEASE ORAL DAILY
COMMUNITY
Start: 2019-07-14 | End: 2022-09-26 | Stop reason: SDUPTHER

## 2019-08-06 NOTE — PROGRESS NOTES
RN-CDE Note    Last Retinal Exam: on file and up-to-date  Daily Foot Exam: No advised  Routine Dental Exams: Yes    Objective:     Exam:  Monofilament: done   Monofilament testing with a 10 gram force: sensation intact: decreased bilaterally  Visual Inspection: Feet without maceration, ulcers, fissures.  Pedal pulses: intact bilaterally    Plan:     Discussed and educated on:   - Annual eye examinations at Ophthalmology  - Foot Care: what to look for when checking feet every day and when to contact HCP

## 2019-08-06 NOTE — PROGRESS NOTES
Return to office Patient Consult Note  Referred by: Neymar Klein M.D.    Reason for consult: Diabetes Management Type 2    HPI:  Fara Chavira is a 73 y.o. old patient who is seeing us today for diabetes care.  This is a pleasant patient with diabetes and I appreciate the opportunity to participate in the care of this patient.    Labs of 8/6/2019 HbA1c is 7.9  Labs of 5/6/19 HbA1c is 7.1  Labs of  11/13/18 HbA1c 8.0, microalbumin negative    BG Diary:8/6/2019  In the AM:  See [facility]     1. (HCC) Uncontrolled type 2 diabetes mellitus without complication, without long-term current use of insulin  This is a new patient with me on 5/6/2019  They are on:  1.  Glipizide  2.  Metformin     STOP:  1.  Glipizide     START:  1.  Synjardy 12.5/1000 once in the AM and once in the PM    2. (HCC) Hypertension associated with diabetes  This is stable today and no new changes are needed or required in today's visit      ROS:   Constitutional: No night sweats.  Eyes:  No visual changes.  Cardiac: No chest pain, No palpitations or racing heart rate.  Resp: No shortness of breath, No cough,   Gi: No Diarrhea    All other systems were reviewed and were/are negative.     Past Medical History:  Patient Active Problem List    Diagnosis Date Noted   • Urinary frequency 11/27/2018   • Benign hemangioma -- Hepatic 10/16/2018   • (HCC) Hypertension associated with diabetes 03/28/2018   • (HCC) Mood disorder 03/28/2018   • (HCC) Uncontrolled type 2 diabetes mellitus without complication, without long-term current use of insulin 02/14/2018   • (HCC) Hyperlipidemia associated with type 2 diabetes mellitus 02/14/2018   • (HCC) Mild single current episode of major depressive disorder 02/14/2018   • Nocturia more than twice per night 02/14/2018       Past Surgical History:  Past Surgical History:   Procedure Laterality Date   • OTHER ABDOMINAL SURGERY      Bowel surgery d/t intusussception (doesn't know LI vs. SI, no GI complaints  now)       Allergies:  Mushroom extract complex and Pcn [penicillins]    Social History:  Social History     Socioeconomic History   • Marital status:      Spouse name: Not on file   • Number of children: Not on file   • Years of education: Not on file   • Highest education level: Not on file   Occupational History   • Not on file   Social Needs   • Financial resource strain: Not on file   • Food insecurity:     Worry: Not on file     Inability: Not on file   • Transportation needs:     Medical: Not on file     Non-medical: Not on file   Tobacco Use   • Smoking status: Former Smoker     Last attempt to quit: 1971     Years since quittin.6   • Smokeless tobacco: Never Used   • Tobacco comment: experimented in college   Substance and Sexual Activity   • Alcohol use: No   • Drug use: No   • Sexual activity: Yes     Partners: Female     Birth control/protection: Post-Menopausal   Lifestyle   • Physical activity:     Days per week: Not on file     Minutes per session: Not on file   • Stress: Not on file   Relationships   • Social connections:     Talks on phone: Not on file     Gets together: Not on file     Attends Sabianism service: Not on file     Active member of club or organization: Not on file     Attends meetings of clubs or organizations: Not on file     Relationship status: Not on file   • Intimate partner violence:     Fear of current or ex partner: Not on file     Emotionally abused: Not on file     Physically abused: Not on file     Forced sexual activity: Not on file   Other Topics Concern   • Not on file   Social History Narrative   • Not on file       Family History:  Family History   Problem Relation Age of Onset   • Diabetes Brother    • Kidney Disease Brother    • Diabetes Sister        Medications:    Current Outpatient Medications:   •  alfuzosin (UROXATRAL) 10 MG SR tablet, , Disp: , Rfl:   •  Continuous Blood Gluc Sensor ([place] [employer] SENSOR SYSTEM) [facility], 1 Applicator by Conor  "not apply route every 14 days. Please give 14 day sensor and reader, Disp: 2 Each, Rfl: 11  •  Continuous Blood Gluc  (FREESTYLE ESDRAS READER) Device, 1 Device by Does not apply route 6 Times a Day. Please give 14 day sensor and reader, Disp: 1 Device, Rfl: 1  •  Empagliflozin-Metformin HCl ER 12.5-1000 MG TABLET SR 24 HR, Take 1 Tab by mouth 2 times a day., Disp: 60 Tab, Rfl: 10  •  simvastatin (ZOCOR) 20 MG Tab, TAKE ONE TABLET BY MOUTH EVERY EVENING, Disp: 90 Tab, Rfl: 1  •  FLUoxetine (PROZAC) 20 MG Cap, TAKE ONE CAPSULE BY MOUTH EVERY DAY, Disp: 90 Cap, Rfl: 1  •  cyanocobalamin (VITAMIN B-12) 100 MCG Tab, Take 100 mcg by mouth every day., Disp: , Rfl:   •  cholecalciferol (VITAMIN D3) 400 UNIT Tab, Take 400 Units by mouth every day., Disp: , Rfl:   •  ASPIRIN, by Does not apply route., Disp: , Rfl:         Physical Examination:   Vital signs: /56 (BP Location: Left arm, Patient Position: Sitting)   Pulse (!) 59   Ht 1.803 m (5' 11\")   Wt 79.8 kg (176 lb)   SpO2 97%   BMI 24.55 kg/m²   General: No distress, cooperative, well dressed and well nourished.   Eyes: No scleral icterus or discharge, No hyposphagma  ENMT: Normal on external inspection of nose, lips, No nasal drainage   Neck: No abnormal masses on inspection  Resp: Normal effort, Bilateral clear to auscultation, No wheezing, No rales  CVS: Regular rate and rhythm, S1 S2 normal, No murmur. No gallop  Extremities: No edema bilateral extremities  Neuro: Alert and oriented  Skin: No rash, No Ulcers  Psych: Normal mood and affect      Assessment and Plan:    1. (HCC) Uncontrolled type 2 diabetes mellitus without complication, without long-term current use of insulin    START:  1.  Segluromet 7.5/1000 once in the AM and once in the PM  Stop Diovan    2. (HCC) Hypertension associated with diabetes  This is stable today and no new changes are needed or required in today's visit    Return in about 3 months (around 11/6/2019).    Thank you " kindly for allowing me to participate in the diabetes care plan for this patient.    Shiv Garza PA-C, BC-ADM  Board Certified - Advanced Diabetes Management  08/06/19    CC:   Neymar Klein M.D.

## 2019-11-07 ENCOUNTER — OFFICE VISIT (OUTPATIENT)
Dept: ENDOCRINOLOGY | Facility: MEDICAL CENTER | Age: 74
End: 2019-11-07
Payer: COMMERCIAL

## 2019-11-07 VITALS
DIASTOLIC BLOOD PRESSURE: 64 MMHG | WEIGHT: 181 LBS | BODY MASS INDEX: 25.34 KG/M2 | HEART RATE: 70 BPM | OXYGEN SATURATION: 97 % | SYSTOLIC BLOOD PRESSURE: 110 MMHG | HEIGHT: 71 IN

## 2019-11-07 DIAGNOSIS — I15.2 HYPERTENSION ASSOCIATED WITH DIABETES (HCC): ICD-10-CM

## 2019-11-07 DIAGNOSIS — E11.59 HYPERTENSION ASSOCIATED WITH DIABETES (HCC): ICD-10-CM

## 2019-11-07 LAB
HBA1C MFR BLD: 8.7 % (ref 0–5.6)
INT CON NEG: NEGATIVE
INT CON POS: POSITIVE

## 2019-11-07 PROCEDURE — 99214 OFFICE O/P EST MOD 30 MIN: CPT | Performed by: PHYSICIAN ASSISTANT

## 2019-11-07 PROCEDURE — 83036 HEMOGLOBIN GLYCOSYLATED A1C: CPT | Performed by: PHYSICIAN ASSISTANT

## 2019-11-07 NOTE — PROGRESS NOTES
Return to office Patient Consult Note  Referred by: Neymar Klein M.D.    Reason for consult: Diabetes Management Type 2    HPI:  Fara Chavira is a 74 y.o. old patient who is seeing us today for diabetes care.  This is a pleasant patient with diabetes and I appreciate the opportunity to participate in the care of this patient.    Labs of 11/7/2019 HbA1c is 8.7  Labs of 8/6/2019 HbA1c is 7.9  Labs of 5/6/19 HbA1c is 7.1  Labs of  11/13/18 HbA1c 8.0, microalbumin negative    BG Diary:11/7/2019  In the AM:  No log      1. (HCC) Uncontrolled type 2 diabetes mellitus without complication, without long-term current use of insulin  This is a new patient with me on 5/6/2019  They were on:  1.  Glipizide  2.  Metformin     Now on:  (not taking) makes him dizzy  1.  Segluromet 7.5/1000 once in the AM and once in the PM  Stop Diovan     2. (HCC) Hypertension associated with diabetes  This is stable today and no new changes are needed or required in today's visit       ROS:   Constitutional: No night sweats.  Eyes:  No visual changes.  Cardiac: No chest pain, No palpitations or racing heart rate.  Resp: No shortness of breath, No cough,   Gi: No Diarrhea    All other systems were reviewed and were/are negative.      Past Medical History:  Patient Active Problem List    Diagnosis Date Noted   • Urinary frequency 11/27/2018   • Benign hemangioma -- Hepatic 10/16/2018   • (HCC) Hypertension associated with diabetes 03/28/2018   • (HCC) Mood disorder 03/28/2018   • (HCC) Uncontrolled type 2 diabetes mellitus without complication, without long-term current use of insulin 02/14/2018   • (HCC) Hyperlipidemia associated with type 2 diabetes mellitus 02/14/2018   • (HCC) Mild single current episode of major depressive disorder 02/14/2018   • Nocturia more than twice per night 02/14/2018       Past Surgical History:  Past Surgical History:   Procedure Laterality Date   • OTHER ABDOMINAL SURGERY      Bowel surgery d/t  intusussception (doesn't know LI vs. SI, no GI complaints now)       Allergies:  Mushroom extract complex and Pcn [penicillins]    Social History:  Social History     Socioeconomic History   • Marital status:      Spouse name: Not on file   • Number of children: Not on file   • Years of education: Not on file   • Highest education level: Not on file   Occupational History   • Not on file   Social Needs   • Financial resource strain: Not on file   • Food insecurity:     Worry: Not on file     Inability: Not on file   • Transportation needs:     Medical: Not on file     Non-medical: Not on file   Tobacco Use   • Smoking status: Former Smoker     Last attempt to quit: 1971     Years since quittin.8   • Smokeless tobacco: Never Used   • Tobacco comment: experimented in college   Substance and Sexual Activity   • Alcohol use: No   • Drug use: No   • Sexual activity: Yes     Partners: Female     Birth control/protection: Post-Menopausal   Lifestyle   • Physical activity:     Days per week: Not on file     Minutes per session: Not on file   • Stress: Not on file   Relationships   • Social connections:     Talks on phone: Not on file     Gets together: Not on file     Attends [employer] service: Not on file     Active member of club or organization: Not on file     Attends meetings of clubs or organizations: Not on file     Relationship status: Not on file   • Intimate partner violence:     Fear of current or ex partner: Not on file     Emotionally abused: Not on file     Physically abused: Not on file     Forced sexual activity: Not on file   Other Topics Concern   • Not on file   Social History Narrative   • Not on file       Family History:  Family History   Problem Relation Age of Onset   • Diabetes Brother    • Kidney Disease Brother    • Diabetes Sister        Medications:    Current Outpatient Medications:   •  FLUoxetine (PROZAC) 20 MG Cap, TAKE ONE CAPSULE BY MOUTH EVERY DAY, Disp: 90 Cap, Rfl:  "0  •  alfuzosin (UROXATRAL) 10 MG SR tablet, , Disp: , Rfl:   •  Ertugliflozin-metFORMIN HCl (SEGLUROMET) 7.5-1000 MG Tab, Take 1 tablet by mouth 2 Times a Day., Disp: 60 Tab, Rfl: 11  •  simvastatin (ZOCOR) 20 MG Tab, TAKE ONE TABLET BY MOUTH EVERY EVENING, Disp: 90 Tab, Rfl: 1  •  cyanocobalamin (VITAMIN B-12) 100 MCG Tab, Take 100 mcg by mouth every day., Disp: , Rfl:   •  cholecalciferol (VITAMIN D3) 400 UNIT Tab, Take 400 Units by mouth every day., Disp: , Rfl:   •  ASPIRIN, by Does not apply route., Disp: , Rfl:         Physical Examination:  Vital signs: /64 (BP Location: Left arm, Patient Position: Sitting)   Pulse 70   Ht 1.803 m (5' 11\")   Wt 82.1 kg (181 lb)   SpO2 97%   BMI 25.24 kg/m²   General: No distress, cooperative, well dressed and well nourished.   Eyes: No scleral icterus or discharge, No hyposphagma  ENMT: Normal on external inspection of nose, lips, No nasal drainage   Neck: No abnormal masses on inspection  Resp: Normal effort, Bilateral clear to auscultation, No wheezing, No rales  CVS: Regular rate and rhythm, S1 S2 normal, No murmur. No gallop  Extremities: No edema bilateral extremities  Neuro: Alert and oriented  Skin: No rash, No Ulcers  Psych: Normal mood and affect      Assessment and Plan:    1. (HCC) Uncontrolled type 2 diabetes mellitus without complication, without long-term current use of insulin    Now on  1.  Metformin 500mg two twice a day  2.  Trulicity 1.5 once a week (start)    gve first dose in the office 0.75 for two weeks and samples given    2. (HCC) Hypertension associated with diabetes  This is low      Return in about 3 months (around 2/7/2020).      This patient during there office visit today was started on a new medication Trulicity.  Side effects of the new medication were discussed with the patient today in the office.       Thank you kindly for allowing me to participate in the diabetes care plan for this patient.    Shiv Garza PA-C, BC-ADM  Board " Certified - Advanced Diabetes Management  11/07/19    CC:   Neymar Klein M.D.

## 2019-11-25 RX ORDER — METFORMIN HYDROCHLORIDE 500 MG/1
1000 TABLET, EXTENDED RELEASE ORAL 2 TIMES DAILY
Qty: 120 TAB | Refills: 6 | Status: SHIPPED | OUTPATIENT
Start: 2019-11-25 | End: 2020-07-07 | Stop reason: SDUPTHER

## 2019-12-06 DIAGNOSIS — E78.5 HYPERLIPIDEMIA ASSOCIATED WITH TYPE 2 DIABETES MELLITUS (HCC): ICD-10-CM

## 2019-12-06 DIAGNOSIS — E11.69 HYPERLIPIDEMIA ASSOCIATED WITH TYPE 2 DIABETES MELLITUS (HCC): ICD-10-CM

## 2019-12-06 RX ORDER — SIMVASTATIN 20 MG
TABLET ORAL
Qty: 90 TAB | Refills: 1 | Status: SHIPPED | OUTPATIENT
Start: 2019-12-06 | End: 2020-06-19

## 2019-12-06 NOTE — TELEPHONE ENCOUNTER
Was the patient seen in the last year in this department? Yes 11/07/19    Does patient have an active prescription for medications requested? No     Received Request Via: Pharmacy     simvastatin (ZOCOR) 20 MG Tab   Sig: TAKE ONE TABLET BY MOUTH EVERY EVENING

## 2020-01-29 ENCOUNTER — OFFICE VISIT (OUTPATIENT)
Dept: MEDICAL GROUP | Facility: MEDICAL CENTER | Age: 75
End: 2020-01-29
Payer: COMMERCIAL

## 2020-01-29 VITALS
OXYGEN SATURATION: 95 % | WEIGHT: 175.27 LBS | TEMPERATURE: 97.6 F | DIASTOLIC BLOOD PRESSURE: 60 MMHG | BODY MASS INDEX: 24.54 KG/M2 | HEART RATE: 66 BPM | HEIGHT: 71 IN | SYSTOLIC BLOOD PRESSURE: 92 MMHG

## 2020-01-29 DIAGNOSIS — F32.0 MILD SINGLE CURRENT EPISODE OF MAJOR DEPRESSIVE DISORDER (HCC): ICD-10-CM

## 2020-01-29 DIAGNOSIS — E53.8 B12 DEFICIENCY: ICD-10-CM

## 2020-01-29 DIAGNOSIS — E11.69 HYPERLIPIDEMIA ASSOCIATED WITH TYPE 2 DIABETES MELLITUS (HCC): ICD-10-CM

## 2020-01-29 DIAGNOSIS — K76.0 FATTY LIVER: ICD-10-CM

## 2020-01-29 DIAGNOSIS — F39 MOOD DISORDER (HCC): ICD-10-CM

## 2020-01-29 DIAGNOSIS — R19.7 DIARRHEA, UNSPECIFIED TYPE: ICD-10-CM

## 2020-01-29 DIAGNOSIS — E78.5 HYPERLIPIDEMIA ASSOCIATED WITH TYPE 2 DIABETES MELLITUS (HCC): ICD-10-CM

## 2020-01-29 DIAGNOSIS — E11.59 HYPERTENSION ASSOCIATED WITH DIABETES (HCC): ICD-10-CM

## 2020-01-29 DIAGNOSIS — I15.2 HYPERTENSION ASSOCIATED WITH DIABETES (HCC): ICD-10-CM

## 2020-01-29 DIAGNOSIS — Z12.5 ENCOUNTER FOR SCREENING FOR MALIGNANT NEOPLASM OF PROSTATE: ICD-10-CM

## 2020-01-29 DIAGNOSIS — E55.9 VITAMIN D DEFICIENCY: ICD-10-CM

## 2020-01-29 LAB
HBA1C MFR BLD: 8.7 % (ref 0–5.6)
INT CON NEG: NEGATIVE
INT CON POS: POSITIVE

## 2020-01-29 PROCEDURE — 83036 HEMOGLOBIN GLYCOSYLATED A1C: CPT | Performed by: INTERNAL MEDICINE

## 2020-01-29 PROCEDURE — 99204 OFFICE O/P NEW MOD 45 MIN: CPT | Performed by: INTERNAL MEDICINE

## 2020-01-29 RX ORDER — VALSARTAN 40 MG/1
TABLET ORAL
COMMUNITY
Start: 2018-10-07 | End: 2020-01-29

## 2020-01-29 RX ORDER — MEFLOQUINE HYDROCHLORIDE 250 MG/1
TABLET ORAL
COMMUNITY
Start: 2018-11-06 | End: 2020-01-29

## 2020-01-29 RX ORDER — GLIPIZIDE 2.5 MG/1
TABLET, EXTENDED RELEASE ORAL
COMMUNITY
Start: 2018-10-16 | End: 2020-01-29

## 2020-01-29 RX ORDER — IBUPROFEN 800 MG/1
TABLET ORAL
COMMUNITY
Start: 2019-11-15 | End: 2020-01-29

## 2020-01-29 RX ORDER — FLUOXETINE HYDROCHLORIDE 20 MG/1
20 CAPSULE ORAL
Qty: 90 CAP | Refills: 1 | Status: SHIPPED | OUTPATIENT
Start: 2020-01-29 | End: 2020-08-10 | Stop reason: SDUPTHER

## 2020-01-29 RX ORDER — GLIPIZIDE 5 MG/1
5 TABLET, FILM COATED, EXTENDED RELEASE ORAL DAILY
Qty: 30 TAB | Refills: 11 | Status: SHIPPED | OUTPATIENT
Start: 2020-01-29 | End: 2020-03-17 | Stop reason: SDUPTHER

## 2020-01-29 ASSESSMENT — PATIENT HEALTH QUESTIONNAIRE - PHQ9: CLINICAL INTERPRETATION OF PHQ2 SCORE: 0

## 2020-01-29 NOTE — LETTER
[employer]  Melissa Miller D.O.  [address]  [place] [address]  Fax: [phone]   Authorization for Release/Disclosure of   Protected Health Information   Name: FARA ALONZO : 1945 SSN: [id number]   Address: [address] [place] [address]  [place] [address] Phone:    [phone] (home) [phone] (work)   I authorize the entity listed below to release/disclose the PHI below to:   [facility]/Melissa Miller D.O. and Melissa Miller D.O.   Provider or Entity Name:  [facility]   Address   City, State, [facility]   Phone:      Fax:     Reason for request: continuity of care   Information to be released:    [  ] LAST COLONOSCOPY,  including any PATH REPORT and follow-up  [  ] LAST FIT/COLOGUARD RESULT [  ] LAST DEXA  [  ] LAST MAMMOGRAM  [  ] LAST PAP  [  ] LAST LABS [  ] RETINA EXAM REPORT  [  ] IMMUNIZATION RECORDS  [x] Release all info      [x] Check here and initial the line next to each item to release ALL health information INCLUDING  _____ Care and treatment for drug and / or alcohol abuse  _____ HIV testing, infection status, or AIDS  _____ Genetic Testing    DATES OF SERVICE OR TIME PERIOD TO BE DISCLOSED: _____________  I understand and acknowledge that:  * This Authorization may be revoked at any time by you in writing, except if your health information has already been used or disclosed.  * Your health information that will be used or disclosed as a result of you signing this authorization could be re-disclosed by the recipient. If this occurs, your re-disclosed health information may no longer be protected by State or Federal laws.  * You may refuse to sign this Authorization. Your refusal will not affect your ability to obtain treatment.  * This Authorization becomes effective upon signing and will  on (date) __________.      If no date is indicated, this Authorization will  one (1) year from the signature date.    Name: Fara Alonzo    Signature:   Date:     1/29/2020       PLEASE FAX REQUESTED RECORDS BACK TO: [phone]

## 2020-01-31 ENCOUNTER — HOSPITAL ENCOUNTER (OUTPATIENT)
Dept: LAB | Facility: MEDICAL CENTER | Age: 75
End: 2020-01-31
Attending: INTERNAL MEDICINE
Payer: COMMERCIAL

## 2020-01-31 DIAGNOSIS — E53.8 B12 DEFICIENCY: ICD-10-CM

## 2020-01-31 DIAGNOSIS — E11.59 HYPERTENSION ASSOCIATED WITH DIABETES (HCC): ICD-10-CM

## 2020-01-31 DIAGNOSIS — E55.9 VITAMIN D DEFICIENCY: ICD-10-CM

## 2020-01-31 DIAGNOSIS — E78.5 HYPERLIPIDEMIA ASSOCIATED WITH TYPE 2 DIABETES MELLITUS (HCC): ICD-10-CM

## 2020-01-31 DIAGNOSIS — E11.69 HYPERLIPIDEMIA ASSOCIATED WITH TYPE 2 DIABETES MELLITUS (HCC): ICD-10-CM

## 2020-01-31 DIAGNOSIS — I15.2 HYPERTENSION ASSOCIATED WITH DIABETES (HCC): ICD-10-CM

## 2020-01-31 DIAGNOSIS — Z12.5 ENCOUNTER FOR SCREENING FOR MALIGNANT NEOPLASM OF PROSTATE: ICD-10-CM

## 2020-01-31 DIAGNOSIS — R19.7 DIARRHEA, UNSPECIFIED TYPE: ICD-10-CM

## 2020-01-31 LAB
25(OH)D3 SERPL-MCNC: 37 NG/ML (ref 30–100)
ALBUMIN SERPL BCP-MCNC: 4.2 G/DL (ref 3.2–4.9)
ALBUMIN/GLOB SERPL: 1.4 G/DL
ALP SERPL-CCNC: 54 U/L (ref 30–99)
ALT SERPL-CCNC: 25 U/L (ref 2–50)
AMYLASE SERPL-CCNC: 34 U/L (ref 20–103)
ANION GAP SERPL CALC-SCNC: 7 MMOL/L (ref 0–11.9)
AST SERPL-CCNC: 16 U/L (ref 12–45)
BASOPHILS # BLD AUTO: 1.1 % (ref 0–1.8)
BASOPHILS # BLD: 0.06 K/UL (ref 0–0.12)
BILIRUB SERPL-MCNC: 1.3 MG/DL (ref 0.1–1.5)
BUN SERPL-MCNC: 12 MG/DL (ref 8–22)
CALCIUM SERPL-MCNC: 9.7 MG/DL (ref 8.5–10.5)
CHLORIDE SERPL-SCNC: 104 MMOL/L (ref 96–112)
CHOLEST SERPL-MCNC: 114 MG/DL (ref 100–199)
CO2 SERPL-SCNC: 28 MMOL/L (ref 20–33)
CREAT SERPL-MCNC: 0.97 MG/DL (ref 0.5–1.4)
CREAT UR-MCNC: 145.1 MG/DL
EOSINOPHIL # BLD AUTO: 0.06 K/UL (ref 0–0.51)
EOSINOPHIL NFR BLD: 1.1 % (ref 0–6.9)
ERYTHROCYTE [DISTWIDTH] IN BLOOD BY AUTOMATED COUNT: 43.6 FL (ref 35.9–50)
GLOBULIN SER CALC-MCNC: 3 G/DL (ref 1.9–3.5)
GLUCOSE SERPL-MCNC: 197 MG/DL (ref 65–99)
HCT VFR BLD AUTO: 47.4 % (ref 42–52)
HDLC SERPL-MCNC: 40 MG/DL
HGB BLD-MCNC: 15.7 G/DL (ref 14–18)
IMM GRANULOCYTES # BLD AUTO: 0.01 K/UL (ref 0–0.11)
IMM GRANULOCYTES NFR BLD AUTO: 0.2 % (ref 0–0.9)
LDLC SERPL CALC-MCNC: 57 MG/DL
LIPASE SERPL-CCNC: 22 U/L (ref 11–82)
LYMPHOCYTES # BLD AUTO: 2.07 K/UL (ref 1–4.8)
LYMPHOCYTES NFR BLD: 37.2 % (ref 22–41)
MCH RBC QN AUTO: 30.7 PG (ref 27–33)
MCHC RBC AUTO-ENTMCNC: 33.1 G/DL (ref 33.7–35.3)
MCV RBC AUTO: 92.8 FL (ref 81.4–97.8)
MICROALBUMIN UR-MCNC: 1.2 MG/DL
MICROALBUMIN/CREAT UR: 8 MG/G (ref 0–30)
MONOCYTES # BLD AUTO: 0.36 K/UL (ref 0–0.85)
MONOCYTES NFR BLD AUTO: 6.5 % (ref 0–13.4)
NEUTROPHILS # BLD AUTO: 3.01 K/UL (ref 1.82–7.42)
NEUTROPHILS NFR BLD: 53.9 % (ref 44–72)
NRBC # BLD AUTO: 0 K/UL
NRBC BLD-RTO: 0 /100 WBC
PLATELET # BLD AUTO: 233 K/UL (ref 164–446)
PMV BLD AUTO: 9.8 FL (ref 9–12.9)
POTASSIUM SERPL-SCNC: 4.2 MMOL/L (ref 3.6–5.5)
PROT SERPL-MCNC: 7.2 G/DL (ref 6–8.2)
PSA SERPL-MCNC: 1.43 NG/ML (ref 0–4)
RBC # BLD AUTO: 5.11 M/UL (ref 4.7–6.1)
SODIUM SERPL-SCNC: 139 MMOL/L (ref 135–145)
TRIGL SERPL-MCNC: 85 MG/DL (ref 0–149)
TSH SERPL DL<=0.005 MIU/L-ACNC: 1.64 UIU/ML (ref 0.38–5.33)
VIT B12 SERPL-MCNC: >1500 PG/ML (ref 211–911)
WBC # BLD AUTO: 5.6 K/UL (ref 4.8–10.8)

## 2020-01-31 PROCEDURE — 36415 COLL VENOUS BLD VENIPUNCTURE: CPT

## 2020-01-31 PROCEDURE — 82150 ASSAY OF AMYLASE: CPT

## 2020-01-31 PROCEDURE — 83690 ASSAY OF LIPASE: CPT

## 2020-01-31 PROCEDURE — 82570 ASSAY OF URINE CREATININE: CPT

## 2020-01-31 PROCEDURE — 82043 UR ALBUMIN QUANTITATIVE: CPT

## 2020-01-31 PROCEDURE — 84153 ASSAY OF PSA TOTAL: CPT

## 2020-01-31 PROCEDURE — 82607 VITAMIN B-12: CPT

## 2020-01-31 PROCEDURE — 80053 COMPREHEN METABOLIC PANEL: CPT

## 2020-01-31 PROCEDURE — 84443 ASSAY THYROID STIM HORMONE: CPT

## 2020-01-31 PROCEDURE — 80061 LIPID PANEL: CPT

## 2020-01-31 PROCEDURE — 82306 VITAMIN D 25 HYDROXY: CPT

## 2020-01-31 PROCEDURE — 85025 COMPLETE CBC W/AUTO DIFF WBC: CPT

## 2020-02-03 NOTE — ASSESSMENT & PLAN NOTE
Chronic and stable problem. Appropriate to continue on Prozac 20 mg daily. Will update metabolic panel to ensure there is no SIADH.

## 2020-02-03 NOTE — ASSESSMENT & PLAN NOTE
Chronic problem that requires additional evaluation. Will check Vitamin D level to ensure appropriate stores and adjust his supplementation as indicated. Follow up in 2 weeks.

## 2020-02-03 NOTE — PROGRESS NOTES
Subjective:     CC:  Diagnoses of (HCC) Uncontrolled type 2 diabetes mellitus without complication, without long-term current use of insulin, (HCC) Hyperlipidemia associated with type 2 diabetes mellitus, (HCC) Mild single current episode of major depressive disorder, (HCC) Hypertension associated with diabetes, Vitamin D deficiency, B12 deficiency, Encounter for screening for malignant neoplasm of prostate, Diarrhea, unspecified type, and Fatty liver were pertinent to this visit.    HISTORY OF THE PRESENT ILLNESS: Patient is a 74 y.o. male. This pleasant patient is here today to establish care and discuss the below stated chronic medical conditions. He is accompanied by his wife, Mikayla.    Problem   Fatty Liver    MR Abdomen (10/2018): Hepatic steatosis    During follow up for a liver lesion he was found to also have hepatic steatosis. No personal history of cirrhosis or other liver disease. He has a normal BMI. He is on statin therapy.     Vitamin D Deficiency    He has a history of vitamin D deficiency. No history of osteopenia or osteoporosis. He is taking Vitamin D 400 IU daily.     B12 Deficiency    He has a history of B12 deficiency. He is currently taking 100 mcg of B12 daily. No neuropathy or gait instability at this time. He does endorse dizziness.      Diarrhea    He reports approximately 1 month of intermittent diarrhea. Nonbloody. Sometimes malodorous. Has not noticed if stool sinks or floats. No nocturnal awakenings for diarrhea. Has been using imodium as needed. No known history of C diff. No known thyroid disease. No prior issues with his metformin. He does report eating spicy foods.      (HCC) Hypertension associated with diabetes    He takes no medication at this time, previously on valsartan 40 mg daily but was recommended to discontinue by endocrinology in November 2019. He actually has low blood pressure today (92/60) and the past 2 readings have also run low (106//64). He endorses  "symptoms of orthostasis and previously associated this with his glucose lowering medications. He denies personal history of cardiovascular or cerebrovascular disease.     ([facility]) Uncontrolled type 2 diabetes mellitus without complication, without long-term current use of insulin       Ref. Range 5/6/2019 09:03 8/6/2019 08:44 11/7/2019 08:25   Glycohemoglobin Latest Ref Range: 0.0 - 5.6 % 7.1 (A) 7.9 (A) 8.7 (A)     Patient and his wife think his DM2 was diagnosed around 5433-0351. He was on metformin and glipizide for several years with stable blood sugars and ARB was added eventually for \"kidney disease\" per patient. Initially, his providers were only checking A1c once a year. He denies history of retinopathy or neuropathy.      ([facility]) Hyperlipidemia associated with type 2 diabetes mellitus       Ref. Range 2/19/2018 10:10 10/20/2018 09:53   Cholesterol,Tot Latest Ref Range: 100 - 199 mg/dL 159 113   Triglycerides Latest Ref Range: 0 - 149 mg/dL 142 140   HDL Latest Ref Range: >=40 mg/dL 36 (L) 35 (A)   LDL Latest Ref Range: <100 mg/dL 95 50     He was initiated on simvastatin 20 mg by his previous provider. No known myalgias or side effects. Lipid panel as above is stable.     (HCC) Mild single current episode of major depressive disorder    He has a history of depression for which he utilizes Prozac 20 mg daily. Mood is doing well on the current regimen. No SI/HI. No side effects from the medication.           Allergies: Mushroom extract complex and Pcn [penicillins]    Current Outpatient Medications Ordered in Epic   Medication Sig Dispense Refill   • glipiZIDE SR (GLUCOTROL) 5 MG TABLET SR 24 HR Take 1 Tab by mouth every day. 30 Tab 11   • FLUoxetine (PROZAC) 20 MG Cap Take 1 Cap by mouth every day. 90 Cap 1   • simvastatin (ZOCOR) 20 MG Tab TAKE ONE TABLET BY MOUTH EVERY EVENING 90 Tab 1   • metFORMIN ER (GLUCOPHAGE XR) 500 MG TABLET SR 24 HR Take 2 Tabs by mouth 2 times a day. Please give Generic XR Metformin " "120 Tab 6   • alfuzosin (UROXATRAL) 10 MG SR tablet      • cyanocobalamin (VITAMIN B-12) 100 MCG Tab Take 100 mcg by mouth every day.     • cholecalciferol (VITAMIN D3) 400 UNIT Tab Take 400 Units by mouth every day.     • ASPIRIN by Does not apply route.       No current [facility]-ordered facility-administered medications on file.        Past Medical History:   Diagnosis Date   • Diabetes (HCC)    • Hyperlipidemia    • Hypertension        Past Surgical History:   Procedure Laterality Date   • OTHER ABDOMINAL SURGERY      Bowel surgery d/t intusussception (doesn't know LI vs. SI, no GI complaints now)       Social History     Tobacco Use   • Smoking status: Former Smoker     Packs/day: 0.00     Last attempt to quit: 1971     Years since quittin.1   • Smokeless tobacco: Never Used   • Tobacco comment: continued abstinence   Substance Use Topics   • Alcohol use: No   • Drug use: No       Social History     Patient does not qualify to have social determinant information on file (likely too young).   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 He has been  to Mikayla for over 40 years.       Family History   Problem Relation Age of Onset   • Diabetes Brother    • Kidney Disease Brother    • Diabetes Sister        Health Maintenance: Completed    ROS:   As above in the HPI All Others Reviewed and Negative  Objective:     Exam: BP (!) 92/60 (BP Location: Left arm, Patient Position: Sitting, BP Cuff Size: Adult)   Pulse 66   Temp 36.4 °C (97.6 °F) (Temporal)   Ht 1.803 m (5' 11\")   Wt 79.5 kg (175 lb 4.3 oz)   SpO2 95%  Body mass index is 24.44 kg/m².    General: Normal appearing. No distress. Appears stated age.  EENT: Eyes conjunctiva clear lids without ptosis, extraocular movements intact, ears normal shape and contour, canals are clear bilaterally, tympanic membranes are benign, oropharynx is without erythema, edema or exudates. Fair dentition.   Neck: Supple without JVD. " Thyroid is not enlarged.  Pulmonary: Clear to ausculation.  Normal effort. No rales, ronchi, or wheezing. No cough.  Cardiovascular: Regular rate and rhythm without murmur. No lower extremity edema bilaterally.  Abdomen: Soft, nontender, nondistended. Normal bowel sounds.   Neurologic: No resting tremor, no increased tone or rigidity.  Lymph: No cervical or supraclavicular lymph nodes are palpable  Skin: Warm and dry.  No obvious lesions on skin exposed areas.  Musculoskeletal: Normal gait. No extremity cyanosis, clubbing, or edema. Patient ambulates independently and without a gait aid.  Psych: Normal mood and affect. Alert and oriented x3. Judgment and insight is normal.    Assessment & Plan:   74 y.o. male with the following -    Problem List Items Addressed This Visit     ([facility]) Uncontrolled type 2 diabetes mellitus without complication, without long-term current use of insulin     Chronic and decompensated problem. Will have him update his kidney function and urine microalbumin testing. Will have him restarted glipizide 5 mg daily in addition to metformin 100 mg twice daily. Once we have his lab results will have him return to review and add additional oral glycemic medications. He has already tried and did not tolerate SGLT2 and DPP4 in combination with metformin.         Relevant Medications    glipiZIDE SR (GLUCOTROL) 5 MG TABLET SR 24 HR    Other Relevant Orders    POCT  A1C (Completed)    Comp Metabolic Panel (Completed)    MICROALBUMIN CREAT RATIO URINE (Completed)    ([facility]) Hyperlipidemia associated with type 2 diabetes mellitus     Chronic and stable problem. He has DM2 and qualifies for treatment with lipid lowering medications. Will update his lipid panel as this is overdue and adjust medications if indicated. Appropriate to continue simvastatin 20 mg daily.         Relevant Medications    glipiZIDE SR (GLUCOTROL) 5 MG TABLET SR 24 HR    Other Relevant Orders    Comp Metabolic Panel (Completed)    Lipid  Profile (Completed)    TSH WITH REFLEX TO FT4 (Completed)    (HCC) Mild single current episode of major depressive disorder     Chronic and stable problem. Appropriate to continue on Prozac 20 mg daily. Will update metabolic panel to ensure there is no SIADH.          (HCC) Hypertension associated with diabetes     Previous problem, currently appears hypotensive and not hypertensive. Agree to continue holding losartan. May also need to hold his prostate medication, alfuzosin as this can also lower blood pressure. Will update his blood counts and metabolic panel to ensure stability and have him return in 2 weeks to continue adjusting blood pressure medications as needed.         Relevant Medications    glipiZIDE SR (GLUCOTROL) 5 MG TABLET SR 24 HR    Other Relevant Orders    CBC WITH DIFFERENTIAL (Completed)    Comp Metabolic Panel (Completed)    Fatty liver     Chronic and stable problem, will observe at this time as patient remains asymptomatic. Obtain metabolic panel to ensure normal liver function studies.          Vitamin D deficiency     Chronic problem that requires additional evaluation. Will check Vitamin D level to ensure appropriate stores and adjust his supplementation as indicated. Follow up in 2 weeks.         Relevant Orders    VITAMIN D,25 HYDROXY (Completed)    B12 deficiency     Chronic problem that requires additional evaluation. Will check B12 level to ensure he is on an appropriate amount of B12 replacement. Will recommend adjustments in dosage as indicated.         Relevant Orders    VITAMIN B12 (Completed)    Diarrhea     New problem, unclear etiology. Will check amylase, lipase, and metabolic panel. If diarrhea is ongoing at our follow up in 2 weeks then will consider additional work up. He is nontoxic appearing and the diarrhea is controlled at this time with imodium.         Relevant Orders    AMYLASE (Completed)    LIPASE (Completed)    TSH WITH REFLEX TO FT4 (Completed)      Other Visit  Diagnoses     Encounter for screening for malignant neoplasm of prostate        Relevant Orders    PROSTATE SPECIFIC AG SCREENING (Completed)           Return in about 2 weeks (around 2/12/2020).    Please note that this dictation was created using voice recognition software. I have made every reasonable attempt to correct obvious errors, but I expect that there are errors of grammar and possibly content that I did not discover before finalizing the note.

## 2020-02-03 NOTE — ASSESSMENT & PLAN NOTE
Previous problem, currently appears hypotensive and not hypertensive. Agree to continue holding losartan. May also need to hold his prostate medication, alfuzosin as this can also lower blood pressure. Will update his blood counts and metabolic panel to ensure stability and have him return in 2 weeks to continue adjusting blood pressure medications as needed.

## 2020-02-03 NOTE — ASSESSMENT & PLAN NOTE
Chronic and stable problem, will observe at this time as patient remains asymptomatic. Obtain metabolic panel to ensure normal liver function studies.

## 2020-02-03 NOTE — ASSESSMENT & PLAN NOTE
New problem, unclear etiology. Will check amylase, lipase, and metabolic panel. If diarrhea is ongoing at our follow up in 2 weeks then will consider additional work up. He is nontoxic appearing and the diarrhea is controlled at this time with imodium.

## 2020-02-03 NOTE — ASSESSMENT & PLAN NOTE
Chronic and decompensated problem. Will have him update his kidney function and urine microalbumin testing. Will have him restarted glipizide 5 mg daily in addition to metformin 100 mg twice daily. Once we have his lab results will have him return to review and add additional oral glycemic medications. He has already tried and did not tolerate SGLT2 and DPP4 in combination with metformin.

## 2020-02-03 NOTE — ASSESSMENT & PLAN NOTE
Chronic problem that requires additional evaluation. Will check B12 level to ensure he is on an appropriate amount of B12 replacement. Will recommend adjustments in dosage as indicated.

## 2020-02-12 ENCOUNTER — OFFICE VISIT (OUTPATIENT)
Dept: MEDICAL GROUP | Facility: MEDICAL CENTER | Age: 75
End: 2020-02-12
Payer: COMMERCIAL

## 2020-02-12 VITALS
HEART RATE: 65 BPM | TEMPERATURE: 97 F | WEIGHT: 176.37 LBS | SYSTOLIC BLOOD PRESSURE: 100 MMHG | DIASTOLIC BLOOD PRESSURE: 60 MMHG | HEIGHT: 71 IN | BODY MASS INDEX: 24.69 KG/M2 | OXYGEN SATURATION: 96 %

## 2020-02-12 DIAGNOSIS — I15.2 HYPERTENSION ASSOCIATED WITH DIABETES (HCC): ICD-10-CM

## 2020-02-12 DIAGNOSIS — E78.5 HYPERLIPIDEMIA ASSOCIATED WITH TYPE 2 DIABETES MELLITUS (HCC): ICD-10-CM

## 2020-02-12 DIAGNOSIS — E55.9 VITAMIN D DEFICIENCY: ICD-10-CM

## 2020-02-12 DIAGNOSIS — R19.7 DIARRHEA, UNSPECIFIED TYPE: ICD-10-CM

## 2020-02-12 DIAGNOSIS — E11.59 HYPERTENSION ASSOCIATED WITH DIABETES (HCC): ICD-10-CM

## 2020-02-12 DIAGNOSIS — E11.69 HYPERLIPIDEMIA ASSOCIATED WITH TYPE 2 DIABETES MELLITUS (HCC): ICD-10-CM

## 2020-02-12 DIAGNOSIS — E53.8 B12 DEFICIENCY: ICD-10-CM

## 2020-02-12 PROCEDURE — 99214 OFFICE O/P EST MOD 30 MIN: CPT | Performed by: INTERNAL MEDICINE

## 2020-02-12 RX ORDER — FLASH GLUCOSE SENSOR
1 KIT MISCELLANEOUS
Qty: 1 EACH | Refills: 3 | Status: SHIPPED | OUTPATIENT
Start: 2020-02-12 | End: 2020-02-12 | Stop reason: SDUPTHER

## 2020-02-12 RX ORDER — FLASH GLUCOSE SENSOR
1 KIT MISCELLANEOUS
Qty: 1 EACH | Refills: 3 | Status: SHIPPED | OUTPATIENT
Start: 2020-02-12 | End: 2022-03-01

## 2020-02-12 NOTE — ASSESSMENT & PLAN NOTE
Continue monitoring off ARB due to recent orthostasis in Fall-Winter 2019. He is on alfuzosin for his prostate which will also lower blood pressure. He denies orthostasis at this time since stopping the valsartan.

## 2020-02-12 NOTE — ASSESSMENT & PLAN NOTE
Previous problem, currently resolved. Spontaneous resolution. He will notify me if it returns. No additional work up needed at this time.

## 2020-02-12 NOTE — PROGRESS NOTES
Subjective:   Chief Complaint/History of Present Illness:  Fara Chavira is a 74 y.o. male established patient who presents today to discuss medical problems as listed below. He is accompanied by his wife, Mikayla.    Problem   Vitamin D Deficiency       Ref. Range 1/31/2020 09:14   25-Hydroxy   Vitamin D 25 Latest Ref Range: 30 - 100 ng/mL 37       He has a history of vitamin D deficiency. No history of osteopenia or osteoporosis. He is taking Vitamin D 400 IU daily.     B12 Deficiency       Ref. Range 1/31/2020 09:14   Vitamin B12 -True Cobalamin Latest Ref Range: 211 - 911 pg/mL >1500 (H)       He has a history of B12 deficiency. He is currently taking 3000 mcg of B12 daily. No neuropathy or gait instability at this time.      Diarrhea    He reports approximately 1 month of intermittent diarrhea starting in December 2019. Nonbloody. Sometimes malodorous. Has not noticed if stool sinks or floats. No nocturnal awakenings for diarrhea. Has been using imodium as needed. No known history of C diff. No known thyroid disease. No prior issues with his metformin. He does report eating spicy foods.     He reports diarrhea has subsided since he met in late January 2020. He associates it with eating certain foods which he has started to remove from his diet.     (HCC) Hypertension associated with diabetes    He takes no medication at this time, previously on valsartan 40 mg daily but was recommended to discontinue by endocrinology in November 2019. He actually has low blood pressure today (92/60) and the past 2 readings have also run low (106//64). At that time he endorsed symptoms of orthostasis and previously associated this with his glucose lowering medications. He denies personal history of cardiovascular or cerebrovascular disease.    Of note, he takes alfuzosin for the prostate which has some blood pressure lowering effect.     (HCC) Uncontrolled type 2 diabetes mellitus without complication, without long-term  "current use of insulin       Ref. Range 5/6/2019 09:03 8/6/2019 08:44 11/7/2019 08:25 1/29/2020 16:18   Glycohemoglobin Latest Ref Range: 0.0 - 5.6 % 7.1 (A) 7.9 (A) 8.7 (A) 8.7 (A)     Patient and his wife think his DM2 was diagnosed around 4424-2242. He was on metformin and glipizide for several years with stable blood sugars and ARB was added eventually for \"kidney disease\" per patient. Initially, his providers were only checking A1c once a year. He denies history of retinopathy or neuropathy.     ARB discontinued due to hypotension and concerns for orthostasis.    No nephropathy or kidney disease on lab testing. He plans to complete an eye visit in late February 2020 for retinal screening.      ([facility]) Hyperlipidemia associated with type 2 diabetes mellitus       Ref. Range 1/31/2020 09:14   Cholesterol,Tot Latest Ref Range: 100 - 199 mg/dL 114   Triglycerides Latest Ref Range: 0 - 149 mg/dL 85   HDL Latest Ref Range: >=40 mg/dL 40   LDL Latest Ref Range: <100 mg/dL 57     He was initiated on simvastatin 20 mg by his previous provider. No known myalgias or side effects. Lipid panel as above is stable.          Additional History:   Allergies:    Mushroom extract complex and Pcn [penicillins]     Current Medications:     Current Outpatient Medications   Medication Sig Dispense Refill   • Continuous Blood Gluc Sensor (FREESTYLE ESDRAS 14 DAY SENSOR) Misc 1 Each by Does not apply route every 14 days. 1 Each 3   • FLUoxetine (PROZAC) 20 MG Cap Take 1 Cap by mouth every day. 90 Cap 1   • glipiZIDE SR (GLUCOTROL) 5 MG TABLET SR 24 HR Take 1 Tab by mouth every day. 30 Tab 11   • simvastatin (ZOCOR) 20 MG Tab TAKE ONE TABLET BY MOUTH EVERY EVENING 90 Tab 1   • metFORMIN ER (GLUCOPHAGE XR) 500 MG TABLET SR 24 HR Take 2 Tabs by mouth 2 times a day. Please give Generic XR Metformin 120 Tab 6   • alfuzosin (UROXATRAL) 10 MG SR tablet      • cholecalciferol (VITAMIN D3) 400 UNIT Tab Take 400 Units by mouth every day.     • " "ASPIRIN by Does not apply route.       No current facility-administered medications for this visit.         Social History:     Social History     Tobacco Use   • Smoking status: Former Smoker     Packs/day: 0.00     Last attempt to quit: 1971     Years since quittin.1   • Smokeless tobacco: Never Used   • Tobacco comment: continued abstinence   Substance Use Topics   • Alcohol use: No   • Drug use: No       ROS: As above in the HPI      Objective:   Physical Exam:    Vitals: /60   Pulse 65   Temp 36.1 °C (97 °F) (Temporal)   Ht 1.803 m (5' 11\")   Wt 80 kg (176 lb 5.9 oz)   SpO2 96%    BMI: Body mass index is 24.6 kg/m².   General/Constitutional: Vitals as above, Well nourished, well developed male in no acute distress   Eyes: Head is grossly normal & atraumatic, bilateral conjunctivae clear and not injected, bilateral EOMI   ENT: Bilateral external ears grossly normal in appearance, Hearing grossly intact, External nares normal in appearance and without discharge/bleeding   Respiratory: No respiratory distress, bilateral lungs are clear to ausculation in all lung fields, no wheezing/rhonchi/rales, no cough   Cardiovascular: Regular rate and rhythm without murmur/rubs, distal pulses are intact and equal bilaterally (radial), no bilateral lower extremity edema   MSK: Gait grossly normal & not antalgic   Integumentary: No apparent rashes on skin exposed areas   Psych: Judgment grossly appropriate, no apparent depression/anxiety    Health Maintenance:     - Completed    Assessment and Plan:     Problem List Items Addressed This Visit     (HCC) Uncontrolled type 2 diabetes mellitus without complication, without long-term current use of insulin     Chronic and ongoing problem. Continue glipizide 5 mg daily and metformin 1000 mg twice daily. Patient will start using [place] sensor/meter again, prescription provided today. They will e-mail me fasting and meal time blood sugars in 1 week and I will " adjust his medications as indicated. Consider adding actos or januvia if additional oral agent is needed. Continue holding ARB as he has no nephropathy and borderline low blood pressure. Continue statin and aspirin.         Relevant Medications    Continuous Blood Gluc Sensor (FREESTYLE ESDRAS 14 DAY SENSOR) Misc    (HCC) Hyperlipidemia associated with type 2 diabetes mellitus     Chronic and stable problem. Continue simvastatin 20 mg daily.         (HCC) Hypertension associated with diabetes     Continue monitoring off ARB due to recent orthostasis in Fall-Winter 2019. He is on alfuzosin for his prostate which will also lower blood pressure. He denies orthostasis at this time since stopping the valsartan.         Vitamin D deficiency     Previous problem, improved on current dose of Vitamin D. Continue supplementation.          B12 deficiency     Previous problem, improved. Stop B12 replacement and recheck on next set of lab work.          Diarrhea     Previous problem, currently resolved. Spontaneous resolution. He will notify me if it returns. No additional work up needed at this time.             RTC: 11 weeks    PLEASE NOTE: This dictation was created using voice recognition software. I have made every reasonable attempt to correct obvious errors, but I expect that there are errors of grammar and possibly content that I did not discover before finalizing the note.

## 2020-02-12 NOTE — ASSESSMENT & PLAN NOTE
Chronic and ongoing problem. Continue glipizide 5 mg daily and metformin 1000 mg twice daily. Patient will start using Hernando sensor/meter again, prescription provided today. They will e-mail me fasting and meal time blood sugars in 1 week and I will adjust his medications as indicated. Consider adding actos or januvia if additional oral agent is needed. Continue holding ARB as he has no nephropathy and borderline low blood pressure. Continue statin and aspirin.

## 2020-02-20 ENCOUNTER — APPOINTMENT (OUTPATIENT)
Dept: ENDOCRINOLOGY | Facility: MEDICAL CENTER | Age: 75
End: 2020-02-20
Payer: MEDICARE

## 2020-03-16 NOTE — PROGRESS NOTES
Subjective:      Fara Chavira is a 72 y.o. male who presents with Cough (x 2 days, nonproductive cough and joint pain on Rt. elbow)            Cough   This is a new (cough; mild r elbow pn at olec.; no swell) problem. The current episode started in the past 7 days. The problem has been unchanged. The problem occurs every few minutes. The cough is non-productive. Pertinent negatives include no chest pain, fever, myalgias, sore throat or shortness of breath. Nothing aggravates the symptoms. He has tried nothing for the symptoms. The treatment provided no relief. There is no history of asthma or environmental allergies.       Review of Systems   Constitutional: Negative.  Negative for fever.   HENT: Negative.  Negative for sore throat.    Eyes: Negative.    Respiratory: Positive for cough. Negative for sputum production and shortness of breath.    Cardiovascular: Negative.  Negative for chest pain.   Musculoskeletal: Positive for joint pain. Negative for falls and myalgias.   Skin: Negative.    Neurological: Negative.    Endo/Heme/Allergies: Negative for environmental allergies.          Objective:     /82   Pulse 72   Temp 36.4 °C (97.6 °F)   Resp 16   Ht 1.829 m (6')   Wt 82.6 kg (182 lb)   SpO2 96%   BMI 24.68 kg/m²      Physical Exam   Constitutional: He is oriented to person, place, and time. He appears well-developed and well-nourished. No distress.   HENT:   Head: Normocephalic and atraumatic.   Mouth/Throat: Oropharynx is clear and moist.   Eyes: EOM are normal. Pupils are equal, round, and reactive to light.   Neck: Normal range of motion. Neck supple.   Cardiovascular: Normal rate.    Pulmonary/Chest: Effort normal and breath sounds normal. No respiratory distress. He has no wheezes. He has no rales.   Musculoskeletal: Normal range of motion. He exhibits tenderness (mild ttp olec r elbow; no swell). He exhibits no edema.   Neurological: He is alert and oriented to person, place, and time. No  sensory deficit. He exhibits normal muscle tone. Coordination normal.   Skin: Skin is warm and dry. No erythema.   Psychiatric: He has a normal mood and affect. His behavior is normal.   Nursing note and vitals reviewed.    Vitals:    02/03/18 1232   BP: 124/82   Pulse: 72   Resp: 16   Temp: 36.4 °C (97.6 °F)   SpO2: 96%   Weight: 82.6 kg (182 lb)   Height: 1.829 m (6')     Active Ambulatory Problems     Diagnosis Date Noted   • No Active Ambulatory Problems     Resolved Ambulatory Problems     Diagnosis Date Noted   • No Resolved Ambulatory Problems     No Additional Past Medical History     Current Outpatient Prescriptions on File Prior to Visit   Medication Sig Dispense Refill   • glipiZIDE SR (GLUCOTROL) 2.5 MG TABLET SR 24 HR Take 2.5 mg by mouth every day.     • simvastatin (ZOCOR) 10 MG Tab Take 10 mg by mouth every evening.     • valsartan (DIOVAN) 40 MG Tab Take 40 mg by mouth every day.     • METFORMIN HCL by Does not apply route.     • ASPIRIN by Does not apply route.     • azithromycin (ZITHROMAX) 250 MG TABS 2 tabs by mouth day 1, 1 tab by mouth days 2-5 6 Tab 0   • hydrocod polst-chlorphen polst (TUSSIONEX) 10-8 MG/5ML LQCR Take 5 mL by mouth every 12 hours as needed for Cough or Rhinitis. 120 mL 0     No current facility-administered medications on file prior to visit.      Social History     Social History   • Marital status:      Spouse name: N/A   • Number of children: N/A   • Years of education: N/A     Occupational History   • Not on file.     Social History Main Topics   • Smoking status: Former Smoker     Quit date: 12/26/1971   • Smokeless tobacco: Never Used   • Alcohol use No   • Drug use: No   • Sexual activity: Not on file     Other Topics Concern   • Not on file     Social History Narrative   • No narrative on file     History reviewed. No pertinent family history.  Pcn [penicillins]              Assessment/Plan:     1. Bronchitis      · Biaxin; otc prn; nsaids for mild elbow pn    .

## 2020-05-06 ENCOUNTER — OFFICE VISIT (OUTPATIENT)
Dept: MEDICAL GROUP | Facility: MEDICAL CENTER | Age: 75
End: 2020-05-06
Payer: COMMERCIAL

## 2020-05-06 ENCOUNTER — APPOINTMENT (OUTPATIENT)
Dept: MEDICAL GROUP | Facility: MEDICAL CENTER | Age: 75
End: 2020-05-06
Payer: COMMERCIAL

## 2020-05-06 VITALS
BODY MASS INDEX: 25.34 KG/M2 | WEIGHT: 181 LBS | OXYGEN SATURATION: 97 % | TEMPERATURE: 98 F | HEIGHT: 71 IN | SYSTOLIC BLOOD PRESSURE: 106 MMHG | RESPIRATION RATE: 14 BRPM | HEART RATE: 62 BPM | DIASTOLIC BLOOD PRESSURE: 62 MMHG

## 2020-05-06 DIAGNOSIS — F32.0 MILD SINGLE CURRENT EPISODE OF MAJOR DEPRESSIVE DISORDER (HCC): ICD-10-CM

## 2020-05-06 DIAGNOSIS — E11.59 HYPERTENSION ASSOCIATED WITH DIABETES (HCC): ICD-10-CM

## 2020-05-06 DIAGNOSIS — E11.9 CONTROLLED TYPE 2 DIABETES MELLITUS WITHOUT COMPLICATION, WITHOUT LONG-TERM CURRENT USE OF INSULIN (HCC): ICD-10-CM

## 2020-05-06 DIAGNOSIS — E11.69 HYPERLIPIDEMIA ASSOCIATED WITH TYPE 2 DIABETES MELLITUS (HCC): ICD-10-CM

## 2020-05-06 DIAGNOSIS — E11.65 UNCONTROLLED TYPE 2 DIABETES MELLITUS WITH HYPERGLYCEMIA (HCC): ICD-10-CM

## 2020-05-06 DIAGNOSIS — E78.5 HYPERLIPIDEMIA ASSOCIATED WITH TYPE 2 DIABETES MELLITUS (HCC): ICD-10-CM

## 2020-05-06 DIAGNOSIS — I15.2 HYPERTENSION ASSOCIATED WITH DIABETES (HCC): ICD-10-CM

## 2020-05-06 PROBLEM — E55.9 VITAMIN D DEFICIENCY: Status: RESOLVED | Noted: 2020-01-29 | Resolved: 2020-05-06

## 2020-05-06 PROBLEM — E53.8 B12 DEFICIENCY: Status: RESOLVED | Noted: 2020-01-29 | Resolved: 2020-05-06

## 2020-05-06 PROBLEM — R19.7 DIARRHEA: Status: RESOLVED | Noted: 2020-01-29 | Resolved: 2020-05-06

## 2020-05-06 LAB
HBA1C MFR BLD: 7.3 % (ref 0–5.6)
INT CON NEG: NEGATIVE
INT CON POS: POSITIVE

## 2020-05-06 PROCEDURE — 83036 HEMOGLOBIN GLYCOSYLATED A1C: CPT | Performed by: INTERNAL MEDICINE

## 2020-05-06 PROCEDURE — 99214 OFFICE O/P EST MOD 30 MIN: CPT | Performed by: INTERNAL MEDICINE

## 2020-05-06 ASSESSMENT — FIBROSIS 4 INDEX: FIB4 SCORE: 1.03

## 2020-05-06 NOTE — LETTER
Lessonwriter  Melissa Miller D.O.  [address]  [place] [address]  Fax: [phone]   Authorization for Release/Disclosure of   Protected Health Information   Name: FARA ALONZO : 1945 SSN: [id number]   Address: [address] [place] [address]  [place] [address] Phone:    [phone] (home) [phone] (work)   I authorize the entity listed below to release/disclose the PHI below to:   [employer]/Melissa Miller D.O. and Melissa Miller D.O.   Provider or Entity Name:  Matt Jang   Address   City, State, [facility]   Phone:      Fax:     Reason for request: continuity of care   Information to be released:    [  ] LAST COLONOSCOPY,  including any PATH REPORT and follow-up  [  ] LAST FIT/COLOGUARD RESULT [  ] LAST DEXA  [  ] LAST MAMMOGRAM  [  ] LAST PAP  [  ] LAST LABS [x] RETINA EXAM REPORT  [  ] IMMUNIZATION RECORDS  [x] Release all info      [x] Check here and initial the line next to each item to release ALL health information INCLUDING  _____ Care and treatment for drug and / or alcohol abuse  _____ HIV testing, infection status, or AIDS  _____ Genetic Testing    DATES OF SERVICE OR TIME PERIOD TO BE DISCLOSED: _____________  I understand and acknowledge that:  * This Authorization may be revoked at any time by you in writing, except if your health information has already been used or disclosed.  * Your health information that will be used or disclosed as a result of you signing this authorization could be re-disclosed by the recipient. If this occurs, your re-disclosed health information may no longer be protected by State or Federal laws.  * You may refuse to sign this Authorization. Your refusal will not affect your ability to obtain treatment.  * This Authorization becomes effective upon signing and will  on (date) __________.      If no date is indicated, this Authorization will  one (1) year from the signature date.    Name: Fara Alonzo    Signature:   Date:     5/6/2020       PLEASE FAX REQUESTED RECORDS BACK TO: [phone]

## 2020-05-06 NOTE — ASSESSMENT & PLAN NOTE
Chronic and stable problem.  Continue simvastatin 20 mg daily.  Next lipid check will be due in January 2021.

## 2020-05-06 NOTE — ASSESSMENT & PLAN NOTE
Previous problem, improved.  His A1c has come down nicely down to 7.3, previously 8.7.  Current regimen includes metformin 500 mg extended release 2 tabs twice daily and glipizide sustained release 5 mg daily.  He denies any signs or symptoms of hypoglycemia.  No GI side effects at this time.

## 2020-05-06 NOTE — PROGRESS NOTES
"Subjective:   Chief Complaint/History of Present Illness:  Fara Chavira is a 74 y.o. male established patient who presents today to discuss medical problems as listed below.  He is accompanied by his wife, Mikayla.    Problem   ([facility]) Hypertension associated with diabetes       Ref. Range 1/31/2020 09:14   Sodium Latest Ref Range: 135 - 145 mmol/L 139   Potassium Latest Ref Range: 3.6 - 5.5 mmol/L 4.2   Bun Latest Ref Range: 8 - 22 mg/dL 12   Creatinine Latest Ref Range: 0.50 - 1.40 mg/dL 0.97   GFR If Non  Latest Ref Range: >60 mL/min/1.73 m 2 >60       He takes no medication at this time, previously on valsartan 40 mg daily but was recommended to discontinue by endocrinology in November 2019.  Was previously endorsing symptoms of orthostasis which has since improved.     Pressures in clinic have ranged from /60-62. He denies personal history of cardiovascular or cerebrovascular disease.    Of note, he takes alfuzosin for the prostate which has some blood pressure lowering effect.     Controlled Type 2 Diabetes Mellitus Without Complication, Without Long-Term Current Use of Insulin ([facility])       Ref. Range 5/6/2019 09:03 8/6/2019 08:44 11/7/2019 08:25 1/29/2020 16:18 5/6/2020 09:40   Glycohemoglobin Latest Ref Range: 0.0 - 5.6 % 7.1 (A) 7.9 (A) 8.7 (A) 8.7 (A) 7.3 (A)     Patient and his wife think his DM2 was diagnosed around 6079-4570. He was on metformin and glipizide for several years with stable blood sugars and ARB was added eventually for \"kidney disease\" per patient. Initially, his providers were only checking A1c once a year. He denies history of retinopathy or neuropathy.     ARB discontinued due to hypotension and concerns for orthostasis.    No nephropathy or kidney disease on lab testing. Eye exam completed in late February 2020 for retinal screening and was normal.    After his A1c decompensated in January 2020 we reinitiated glipizide SR 5 mg (1 tab daily) in addition to his " metformin  mg (2 tabs twice daily).     (HCC) Hyperlipidemia associated with type 2 diabetes mellitus       Ref. Range 2020 09:14   Cholesterol,Tot Latest Ref Range: 100 - 199 mg/dL 114   Triglycerides Latest Ref Range: 0 - 149 mg/dL 85   HDL Latest Ref Range: >=40 mg/dL 40   LDL Latest Ref Range: <100 mg/dL 57     He was initiated on simvastatin 20 mg by his previous provider. No known myalgias or side effects. Lipid panel as above is stable.     (HCC) Mild single current episode of major depressive disorder    He has a history of depression for which he utilizes Prozac 20 mg daily. Mood is doing well on the current regimen. No SI/HI. No side effects from the medication.             Additional History:   Allergies:    Mushroom extract complex and Pcn [penicillins]     Current Medications:     Current Outpatient Medications   Medication Sig Dispense Refill   • glipiZIDE SR (GLUCOTROL) 5 MG TABLET SR 24 HR Take 1 Tab by mouth every day. 90 Tab 3   • Continuous Blood Gluc Sensor ("[employer]"STYLE ESDRAS 14 DAY SENSOR) Misc 1 Each by Does not apply route every 14 days. 1 Each 3   • FLUoxetine (PROZAC) 20 MG Cap Take 1 Cap by mouth every day. 90 Cap 1   • simvastatin (ZOCOR) 20 MG Tab TAKE ONE TABLET BY MOUTH EVERY EVENING 90 Tab 1   • metFORMIN ER (GLUCOPHAGE XR) 500 MG TABLET SR 24 HR Take 2 Tabs by mouth 2 times a day. Please give Generic XR Metformin 120 Tab 6   • alfuzosin (UROXATRAL) 10 MG SR tablet      • cholecalciferol (VITAMIN D3) 400 UNIT Tab Take 400 Units by mouth every day.     • ASPIRIN by Does not apply route.       No current facility-administered medications for this visit.         Social History:     Social History     Tobacco Use   • Smoking status: Former Smoker     Packs/day: 0.00     Last attempt to quit: 1971     Years since quittin.3   • Smokeless tobacco: Never Used   • Tobacco comment: continued abstinence   Substance Use Topics   • Alcohol use: No   • Drug use: No       ROS: As  "above in the HPI      Objective:   Physical Exam:    Vitals: /62 (BP Location: Left arm, Patient Position: Sitting, BP Cuff Size: Adult)   Pulse 62   Temp 36.7 °C (98 °F) (Temporal)   Resp 14   Ht 1.803 m (5' 11\")   Wt 82.1 kg (181 lb)   SpO2 97%    BMI: Body mass index is 25.24 kg/m².   General/Constitutional: Vitals as above, Well nourished, well developed male in no acute distress   Head/Eyes: Head is grossly normal & atraumatic, bilateral conjunctivae clear and not injected, bilateral EOMI, bilateral PERRLA   ENT: Bilateral external ears grossly normal in appearance, Hearing grossly intact, External nares normal in appearance and without discharge/bleeding   Respiratory: No respiratory distress, bilateral lungs are clear to ausculation in all lung fields, no wheezing/rhonchi/rales   Cardiovascular: Regular rate and rhythm without murmur/gallops/rubs, distal pulses are intact and equal bilaterally (radial), no bilateral lower extremity edema   MSK: Gait grossly normal & not antalgic   Integumentary: No apparent rashes on skin exposed areas   Psych: Judgment grossly appropriate, no apparent depression/anxiety    Health Maintenance:     - Completed    Assessment and Plan:     Problem List Items Addressed This Visit     Controlled type 2 diabetes mellitus without complication, without long-term current use of insulin (HCC)     Previous problem, improved.  His A1c has come down nicely down to 7.3, previously 8.7.  Current regimen includes metformin 500 mg extended release 2 tabs twice daily and glipizide sustained release 5 mg daily.  He denies any signs or symptoms of hypoglycemia.  No GI side effects at this time.         (HCC) Hyperlipidemia associated with type 2 diabetes mellitus     Chronic and stable problem.  Continue simvastatin 20 mg daily.  Next lipid check will be due in January 2021.         (HCC) Mild single current episode of major depressive disorder     Chronic and stable problem.  " Continue Prozac 20 mg daily.         (HCC) Hypertension associated with diabetes     Chronic and stable problem.  Continue observing blood pressure without any formal pharmacotherapy, he is taking alfluzosin to have some blood pressure lowering effects.  Recheck metabolic panel in August 2020.           Other Visit Diagnoses     Uncontrolled type 2 diabetes mellitus with hyperglycemia (HCC)        Relevant Orders    POCT  A1C (Completed)    Comp Metabolic Panel    HEMOGLOBIN A1C         RTC: 6 months.    PLEASE NOTE: This dictation was created using voice recognition software. I have made every reasonable attempt to correct obvious errors, but I expect that there are errors of grammar and possibly content that I did not discover before finalizing the note.

## 2020-07-07 RX ORDER — METFORMIN HYDROCHLORIDE 500 MG/1
1000 TABLET, EXTENDED RELEASE ORAL 2 TIMES DAILY
Qty: 360 TAB | Refills: 3 | Status: SHIPPED | OUTPATIENT
Start: 2020-07-07 | End: 2020-10-06 | Stop reason: SDUPTHER

## 2020-08-10 DIAGNOSIS — F39 MOOD DISORDER (HCC): ICD-10-CM

## 2020-08-10 RX ORDER — FLUOXETINE HYDROCHLORIDE 20 MG/1
20 CAPSULE ORAL
Qty: 90 CAP | Refills: 3 | Status: SHIPPED | OUTPATIENT
Start: 2020-08-10 | End: 2021-09-10

## 2020-08-10 NOTE — TELEPHONE ENCOUNTER
Received request via: Pharmacy    Was the patient seen in the last year in this department? Yes    Does the patient have an active prescription (recently filled or refills available) for medication(s) requested? No     Last ov 05/06/2020

## 2020-09-29 LAB
ALBUMIN SERPL-MCNC: 4.2 G/DL (ref 3.7–4.7)
ALBUMIN/GLOB SERPL: 2 {RATIO} (ref 1.2–2.2)
ALP SERPL-CCNC: 62 IU/L (ref 39–117)
ALT SERPL-CCNC: 28 IU/L (ref 0–44)
AST SERPL-CCNC: 23 IU/L (ref 0–40)
BILIRUB SERPL-MCNC: 0.8 MG/DL (ref 0–1.2)
BUN SERPL-MCNC: 14 MG/DL (ref 8–27)
BUN/CREAT SERPL: 16 (ref 10–24)
CALCIUM SERPL-MCNC: 8.9 MG/DL (ref 8.6–10.2)
CHLORIDE SERPL-SCNC: 101 MMOL/L (ref 96–106)
CO2 SERPL-SCNC: 22 MMOL/L (ref 20–29)
CREAT SERPL-MCNC: 0.86 MG/DL (ref 0.76–1.27)
GLOBULIN SER CALC-MCNC: 2.1 G/DL (ref 1.5–4.5)
GLUCOSE SERPL-MCNC: 108 MG/DL (ref 65–99)
HBA1C MFR BLD: 7.2 % (ref 4.8–5.6)
POTASSIUM SERPL-SCNC: 4.1 MMOL/L (ref 3.5–5.2)
PROT SERPL-MCNC: 6.3 G/DL (ref 6–8.5)
SODIUM SERPL-SCNC: 135 MMOL/L (ref 134–144)

## 2020-10-05 NOTE — RESULT ENCOUNTER NOTE
Miguel Chaudhari,    Labs are looking very stable. Electrolytes and kidney function are normal and A1c remains stable with an average blood sugar of 160.     Let me know if you have any questions before our follow up next month.    Thanks,  Dr. Miller

## 2020-11-12 ENCOUNTER — APPOINTMENT (OUTPATIENT)
Dept: MEDICAL GROUP | Facility: MEDICAL CENTER | Age: 75
End: 2020-11-12
Payer: MEDICARE

## 2021-01-11 DIAGNOSIS — Z23 NEED FOR VACCINATION: ICD-10-CM

## 2021-04-12 DIAGNOSIS — E11.9 CONTROLLED TYPE 2 DIABETES MELLITUS WITHOUT COMPLICATION, WITHOUT LONG-TERM CURRENT USE OF INSULIN (HCC): ICD-10-CM

## 2021-04-12 RX ORDER — GLIPIZIDE 5 MG/1
5 TABLET, FILM COATED, EXTENDED RELEASE ORAL DAILY
Qty: 90 TABLET | Refills: 0 | Status: SHIPPED | OUTPATIENT
Start: 2021-04-12 | End: 2021-07-22

## 2021-04-12 RX ORDER — GLIPIZIDE 5 MG/1
TABLET, FILM COATED, EXTENDED RELEASE ORAL
Refills: 3 | OUTPATIENT
Start: 2021-04-12

## 2021-04-12 NOTE — TELEPHONE ENCOUNTER
He needs a new PCP, have not seen him since May 2020, he was supposed to follow up in 11/2020. He is not SCP. If he can make an appt with someone then I will send in a 3 month supply to hold him over until he can establish care.

## 2021-04-12 NOTE — TELEPHONE ENCOUNTER
Called and lvm for pt to make an apt for Establish care for a new provider. Also sent a [employer] message for pt. LM

## 2021-05-23 SDOH — ECONOMIC STABILITY: HOUSING INSECURITY
IN THE LAST 12 MONTHS, WAS THERE A TIME WHEN YOU DID NOT HAVE A STEADY PLACE TO SLEEP OR SLEPT IN A SHELTER (INCLUDING NOW)?: NO

## 2021-05-23 SDOH — HEALTH STABILITY: MENTAL HEALTH
STRESS IS WHEN SOMEONE FEELS TENSE, NERVOUS, ANXIOUS, OR CAN'T SLEEP AT NIGHT BECAUSE THEIR MIND IS TROUBLED. HOW STRESSED ARE YOU?: ONLY A LITTLE

## 2021-05-23 SDOH — ECONOMIC STABILITY: HOUSING INSECURITY: IN THE LAST 12 MONTHS, HOW MANY PLACES HAVE YOU LIVED?: 1

## 2021-05-23 SDOH — ECONOMIC STABILITY: INCOME INSECURITY: IN THE LAST 12 MONTHS, WAS THERE A TIME WHEN YOU WERE NOT ABLE TO PAY THE MORTGAGE OR RENT ON TIME?: NO

## 2021-05-23 SDOH — ECONOMIC STABILITY: TRANSPORTATION INSECURITY
IN THE PAST 12 MONTHS, HAS THE LACK OF TRANSPORTATION KEPT YOU FROM MEDICAL APPOINTMENTS OR FROM GETTING MEDICATIONS?: NO

## 2021-05-23 SDOH — HEALTH STABILITY: PHYSICAL HEALTH: ON AVERAGE, HOW MANY DAYS PER WEEK DO YOU ENGAGE IN MODERATE TO STRENUOUS EXERCISE (LIKE A BRISK WALK)?: 0 DAYS

## 2021-05-23 SDOH — ECONOMIC STABILITY: TRANSPORTATION INSECURITY
IN THE PAST 12 MONTHS, HAS LACK OF RELIABLE TRANSPORTATION KEPT YOU FROM MEDICAL APPOINTMENTS, MEETINGS, WORK OR FROM GETTING THINGS NEEDED FOR DAILY LIVING?: NO

## 2021-05-23 SDOH — HEALTH STABILITY: PHYSICAL HEALTH: ON AVERAGE, HOW MANY MINUTES DO YOU ENGAGE IN EXERCISE AT THIS LEVEL?: 0 MINUTES

## 2021-05-23 ASSESSMENT — SOCIAL DETERMINANTS OF HEALTH (SDOH)
IN A TYPICAL WEEK, HOW MANY TIMES DO YOU TALK ON THE PHONE WITH FAMILY, FRIENDS, OR NEIGHBORS?: ONCE A WEEK
HOW OFTEN DO YOU ATTEND CHURCH OR RELIGIOUS SERVICES?: NEVER
HOW MANY DRINKS CONTAINING ALCOHOL DO YOU HAVE ON A TYPICAL DAY WHEN YOU ARE DRINKING: 1 OR 2
HOW OFTEN DO YOU HAVE A DRINK CONTAINING ALCOHOL: MONTHLY OR LESS
HOW OFTEN DO YOU ATTENT MEETINGS OF THE CLUB OR ORGANIZATION YOU BELONG TO?: NEVER
HOW OFTEN DO YOU HAVE SIX OR MORE DRINKS ON ONE OCCASION: NEVER
HOW HARD IS IT FOR YOU TO PAY FOR THE VERY BASICS LIKE FOOD, HOUSING, MEDICAL CARE, AND HEATING?: NOT HARD AT ALL
WITHIN THE PAST 12 MONTHS, THE FOOD YOU BOUGHT JUST DIDN'T LAST AND YOU DIDN'T HAVE MONEY TO GET MORE: NEVER TRUE
DO YOU BELONG TO ANY CLUBS OR ORGANIZATIONS SUCH AS CHURCH GROUPS UNIONS, FRATERNAL OR ATHLETIC GROUPS, OR SCHOOL GROUPS?: NO
WITHIN THE PAST 12 MONTHS, YOU WORRIED THAT YOUR FOOD WOULD RUN OUT BEFORE YOU GOT THE MONEY TO BUY MORE: NEVER TRUE
HOW OFTEN DO YOU GET TOGETHER WITH FRIENDS OR RELATIVES?: ONCE A WEEK

## 2021-05-24 ENCOUNTER — OFFICE VISIT (OUTPATIENT)
Dept: MEDICAL GROUP | Facility: MEDICAL CENTER | Age: 76
End: 2021-05-24
Payer: COMMERCIAL

## 2021-05-24 VITALS
HEART RATE: 67 BPM | SYSTOLIC BLOOD PRESSURE: 102 MMHG | BODY MASS INDEX: 24.5 KG/M2 | WEIGHT: 175 LBS | RESPIRATION RATE: 18 BRPM | OXYGEN SATURATION: 95 % | HEIGHT: 71 IN | TEMPERATURE: 96.9 F | DIASTOLIC BLOOD PRESSURE: 62 MMHG

## 2021-05-24 DIAGNOSIS — F32.0 MILD SINGLE CURRENT EPISODE OF MAJOR DEPRESSIVE DISORDER (HCC): ICD-10-CM

## 2021-05-24 DIAGNOSIS — R35.0 URINARY FREQUENCY: ICD-10-CM

## 2021-05-24 DIAGNOSIS — Z76.89 ENCOUNTER TO ESTABLISH CARE: ICD-10-CM

## 2021-05-24 DIAGNOSIS — Z23 NEED FOR SHINGLES VACCINE: ICD-10-CM

## 2021-05-24 DIAGNOSIS — E11.69 HYPERLIPIDEMIA ASSOCIATED WITH TYPE 2 DIABETES MELLITUS (HCC): ICD-10-CM

## 2021-05-24 DIAGNOSIS — E11.9 CONTROLLED TYPE 2 DIABETES MELLITUS WITHOUT COMPLICATION, WITHOUT LONG-TERM CURRENT USE OF INSULIN (HCC): ICD-10-CM

## 2021-05-24 DIAGNOSIS — E78.5 HYPERLIPIDEMIA ASSOCIATED WITH TYPE 2 DIABETES MELLITUS (HCC): ICD-10-CM

## 2021-05-24 PROBLEM — E11.59 HYPERTENSION ASSOCIATED WITH DIABETES (HCC): Status: RESOLVED | Noted: 2018-03-28 | Resolved: 2021-05-24

## 2021-05-24 PROBLEM — I15.2 HYPERTENSION ASSOCIATED WITH DIABETES (HCC): Status: RESOLVED | Noted: 2018-03-28 | Resolved: 2021-05-24

## 2021-05-24 PROBLEM — F39 MOOD DISORDER (HCC): Status: RESOLVED | Noted: 2018-03-28 | Resolved: 2021-05-24

## 2021-05-24 PROCEDURE — 99214 OFFICE O/P EST MOD 30 MIN: CPT | Mod: 25 | Performed by: NURSE PRACTITIONER

## 2021-05-24 PROCEDURE — 90471 IMMUNIZATION ADMIN: CPT | Performed by: NURSE PRACTITIONER

## 2021-05-24 PROCEDURE — 90750 HZV VACC RECOMBINANT IM: CPT | Performed by: NURSE PRACTITIONER

## 2021-05-24 RX ORDER — METFORMIN HYDROCHLORIDE 500 MG/1
1000 TABLET, EXTENDED RELEASE ORAL 2 TIMES DAILY
Qty: 360 TABLET | Refills: 3 | Status: SHIPPED | OUTPATIENT
Start: 2021-05-24 | End: 2021-10-18

## 2021-05-24 RX ORDER — FLASH GLUCOSE SCANNING READER
EACH MISCELLANEOUS
COMMUNITY
End: 2021-05-24

## 2021-05-24 ASSESSMENT — PATIENT HEALTH QUESTIONNAIRE - PHQ9
3. TROUBLE FALLING OR STAYING ASLEEP OR SLEEPING TOO MUCH: NOT AT ALL
4. FEELING TIRED OR HAVING LITTLE ENERGY: SEVERAL DAYS
SUM OF ALL RESPONSES TO PHQ9 QUESTIONS 1 AND 2: 0
2. FEELING DOWN, DEPRESSED, IRRITABLE, OR HOPELESS: NOT AT ALL
7. TROUBLE CONCENTRATING ON THINGS, SUCH AS READING THE NEWSPAPER OR WATCHING TELEVISION: NOT AT ALL
9. THOUGHTS THAT YOU WOULD BE BETTER OFF DEAD, OR OF HURTING YOURSELF: NOT AT ALL
5. POOR APPETITE OR OVEREATING: NOT AT ALL
8. MOVING OR SPEAKING SO SLOWLY THAT OTHER PEOPLE COULD HAVE NOTICED. OR THE OPPOSITE, BEING SO FIGETY OR RESTLESS THAT YOU HAVE BEEN MOVING AROUND A LOT MORE THAN USUAL: NOT AT ALL
SUM OF ALL RESPONSES TO PHQ QUESTIONS 1-9: 1
6. FEELING BAD ABOUT YOURSELF - OR THAT YOU ARE A FAILURE OR HAVE LET YOURSELF OR YOUR FAMILY DOWN: NOT AL ALL
1. LITTLE INTEREST OR PLEASURE IN DOING THINGS: NOT AT ALL

## 2021-05-24 ASSESSMENT — FIBROSIS 4 INDEX: FIB4 SCORE: 1.42

## 2021-05-24 NOTE — ASSESSMENT & PLAN NOTE
Stable and doing well at this time on fluoxetine 20 mg daily.  He will be retiring next month which is causing some uncertainty but overall mood has been stable

## 2021-05-24 NOTE — PROGRESS NOTES
Subjective:     Chief Complaint   Patient presents with   • New Patient     establish care     Fara Chavira is a 75 y.o. male here today to transfer care from Dr. Culver.  His wife, Mikayla, is also my patient.    Controlled type 2 diabetes mellitus without complication, without long-term current use of insulin (HCC)  Last A1c was reasonable at 7.2.  He is not monitoring glucose consistently at home, he did have a continuous glucose monitor but has gotten out of the routine of using this.  He is watching his diet.  Consistent with metformin 1000 mg twice daily, glipizide 5 mg daily.  No polyuria, polydipsia, numbness or tingling in extremities    (HCC) Hyperlipidemia associated with type 2 diabetes mellitus  Chronic issue managed with simvastatin, tolerating medication without difficulty    ([facility]) Mood disorder  Managing with fluoxetine    Urinary frequency  Managing with alfuzosin    ([facility]) Mild single current episode of major depressive disorder  Stable and doing well at this time on fluoxetine 20 mg daily.  He will be retiring next month which is causing some uncertainty but overall mood has been stable       Current medicines (including changes today)  Current Outpatient Medications   Medication Sig Dispense Refill   • Continuous Blood Gluc  (FREESTYLE HERNANDO 14 DAY READER) Device FreeStyle [place] 14 Day Sensor kit     • metFORMIN ER (GLUCOPHAGE XR) 500 MG TABLET SR 24 HR Take 2 Tablets by mouth 2 times a day. Please give Generic XR Metformin 360 tablet 3   • glipiZIDE SR (GLUCOTROL) 5 MG TABLET SR 24 HR Take 1 tablet by mouth every day. 90 tablet 0   • FLUoxetine (PROZAC) 20 MG Cap Take 1 Cap by mouth every day. 90 Cap 3   • simvastatin (ZOCOR) 20 MG Tab TAKE ONE TABLET BY MOUTH EVERY EVENING 90 Tab 3   • Continuous Blood Gluc Sensor (FREESTYLE HERNANDO 14 DAY SENSOR) Misc 1 Each by Does not apply route every 14 days. 1 Each 3   • alfuzosin (UROXATRAL) 10 MG SR tablet      • cholecalciferol (VITAMIN D3)  "400 UNIT Tab Take 400 Units by mouth every day.     • ASPIRIN by Does not apply route.       No current facility-administered medications for this visit.     He  has a past medical history of B12 deficiency (1/29/2020), Diabetes (HCC), Diarrhea (1/29/2020), Hyperlipidemia, Hypertension, and Vitamin D deficiency (1/29/2020). He also has no past medical history of Encounter for long-term (current) use of other medications.    ROS included above     Objective:     /62 (BP Location: Right arm, Patient Position: Sitting, BP Cuff Size: Large adult)   Pulse 67   Temp 36.1 °C (96.9 °F) (Temporal)   Resp 18   Ht 1.803 m (5' 11\")   Wt 79.4 kg (175 lb)   SpO2 95%  Body mass index is 24.41 kg/m².     Physical Exam:  General: Alert, oriented in no acute distress.  Eye contact is good, speech is normal, affect calm  Lungs: clear to auscultation bilaterally, normal effort, no wheeze/ rhonchi/ rales.  CV: regular rate and rhythm, S1, S2, no murmur  Ext: no edema, color normal, vascularity normal, temperature normal    Assessment and Plan:   The following treatment plan was discussed   1. Controlled type 2 diabetes mellitus without complication, without long-term current use of insulin ([facility])   last A1c was reasonable at 7.2.  We will plan for recheck of labs, if consistent I will plan to see him about every 6 months  HEMOGLOBIN A1C    Comp Metabolic Panel    metFORMIN ER (GLUCOPHAGE XR) 500 MG TABLET SR 24 HR    MICROALBUMIN CREAT RATIO URINE   2. (HCC) Hyperlipidemia associated with type 2 diabetes mellitus   doing well on statin  Comp Metabolic Panel    Lipid Profile   3. Urinary frequency   related to BPH, followed by urology.  Stable on current medication   4. (HCC) Mild single current episode of major depressive disorder   stable on fluoxetine   5. Encounter to establish care     6. Need for shingles vaccine  I have placed the below orders and discussed them with an approved delegating provider. The MA is performing " the below orders under the direction of Dr. Nelson Loaiza ([employer])       Followup: pending labs         Please note that this dictation was created using voice recognition software. I have worked with consultants from the vendor as well as technical experts from [facility] to optimize the interface. I have made every reasonable attempt to correct obvious errors, but I expect that there are errors of grammar and possibly content that I did not discover before finalizing the note.

## 2021-05-24 NOTE — ASSESSMENT & PLAN NOTE
Last A1c was reasonable at 7.2.  He is not monitoring glucose consistently at home, he did have a continuous glucose monitor but has gotten out of the routine of using this.  He is watching his diet.  Consistent with metformin 1000 mg twice daily, glipizide 5 mg daily.  No polyuria, polydipsia, numbness or tingling in extremities

## 2021-06-11 LAB
ALBUMIN SERPL-MCNC: 4.2 G/DL (ref 3.7–4.7)
ALBUMIN/CREAT UR: 4 MG/G CREAT (ref 0–29)
ALBUMIN/GLOB SERPL: 1.8 {RATIO} (ref 1.2–2.2)
ALP SERPL-CCNC: 56 IU/L (ref 48–121)
ALT SERPL-CCNC: 19 IU/L (ref 0–44)
AST SERPL-CCNC: 15 IU/L (ref 0–40)
BILIRUB SERPL-MCNC: 1.1 MG/DL (ref 0–1.2)
BUN SERPL-MCNC: 15 MG/DL (ref 8–27)
BUN/CREAT SERPL: 21 (ref 10–24)
CALCIUM SERPL-MCNC: 9.1 MG/DL (ref 8.6–10.2)
CHLORIDE SERPL-SCNC: 103 MMOL/L (ref 96–106)
CHOLEST SERPL-MCNC: 104 MG/DL (ref 100–199)
CO2 SERPL-SCNC: 23 MMOL/L (ref 20–29)
CREAT SERPL-MCNC: 0.73 MG/DL (ref 0.76–1.27)
CREAT UR-MCNC: 127.6 MG/DL
GLOBULIN SER CALC-MCNC: 2.4 G/DL (ref 1.5–4.5)
GLUCOSE SERPL-MCNC: 119 MG/DL (ref 65–99)
HBA1C MFR BLD: 7.4 % (ref 4.8–5.6)
HDLC SERPL-MCNC: 38 MG/DL
LABORATORY COMMENT REPORT: ABNORMAL
LDLC SERPL CALC-MCNC: 46 MG/DL (ref 0–99)
MICROALBUMIN UR-MCNC: 5.7 UG/ML
POTASSIUM SERPL-SCNC: 4.5 MMOL/L (ref 3.5–5.2)
PROT SERPL-MCNC: 6.6 G/DL (ref 6–8.5)
SODIUM SERPL-SCNC: 139 MMOL/L (ref 134–144)
TRIGL SERPL-MCNC: 106 MG/DL (ref 0–149)
VLDLC SERPL CALC-MCNC: 20 MG/DL (ref 5–40)

## 2021-06-30 DIAGNOSIS — E78.5 HYPERLIPIDEMIA ASSOCIATED WITH TYPE 2 DIABETES MELLITUS (HCC): ICD-10-CM

## 2021-06-30 DIAGNOSIS — E11.69 HYPERLIPIDEMIA ASSOCIATED WITH TYPE 2 DIABETES MELLITUS (HCC): ICD-10-CM

## 2021-06-30 RX ORDER — SIMVASTATIN 20 MG
TABLET ORAL
Qty: 90 TABLET | Refills: 3 | Status: SHIPPED | OUTPATIENT
Start: 2021-06-30 | End: 2022-03-01 | Stop reason: SDUPTHER

## 2021-07-21 DIAGNOSIS — E11.9 CONTROLLED TYPE 2 DIABETES MELLITUS WITHOUT COMPLICATION, WITHOUT LONG-TERM CURRENT USE OF INSULIN (HCC): ICD-10-CM

## 2021-07-22 RX ORDER — GLIPIZIDE 5 MG/1
TABLET, FILM COATED, EXTENDED RELEASE ORAL
Qty: 90 TABLET | Refills: 0 | Status: SHIPPED | OUTPATIENT
Start: 2021-07-22 | End: 2021-10-26

## 2021-09-09 DIAGNOSIS — F39 MOOD DISORDER (HCC): ICD-10-CM

## 2021-09-10 RX ORDER — FLUOXETINE HYDROCHLORIDE 20 MG/1
CAPSULE ORAL
Qty: 90 CAPSULE | Refills: 3 | Status: SHIPPED | OUTPATIENT
Start: 2021-09-10 | End: 2022-03-01 | Stop reason: SDUPTHER

## 2021-09-10 NOTE — TELEPHONE ENCOUNTER
Requested Prescriptions     Pending Prescriptions Disp Refills   • FLUoxetine (PROZAC) 20 MG Cap [Pharmacy Med Name: FLUOXETINE HCL 20MG CAPS] 90 Capsule 3     Sig: TAKE ONE CAPSULE BY MOUTH EVERY DAY       Last office visit: 05/24/21  Last labs: 06/09/21

## 2021-10-18 DIAGNOSIS — E11.9 CONTROLLED TYPE 2 DIABETES MELLITUS WITHOUT COMPLICATION, WITHOUT LONG-TERM CURRENT USE OF INSULIN (HCC): ICD-10-CM

## 2021-10-18 RX ORDER — METFORMIN HYDROCHLORIDE 500 MG/1
TABLET, EXTENDED RELEASE ORAL
Qty: 360 TABLET | Refills: 3 | Status: SHIPPED | OUTPATIENT
Start: 2021-10-18 | End: 2021-10-26

## 2021-10-25 DIAGNOSIS — E11.9 CONTROLLED TYPE 2 DIABETES MELLITUS WITHOUT COMPLICATION, WITHOUT LONG-TERM CURRENT USE OF INSULIN (HCC): ICD-10-CM

## 2021-10-26 RX ORDER — GLIPIZIDE 5 MG/1
TABLET, FILM COATED, EXTENDED RELEASE ORAL
Qty: 90 TABLET | Refills: 0 | Status: SHIPPED | OUTPATIENT
Start: 2021-10-26 | End: 2022-02-10 | Stop reason: SDUPTHER

## 2021-11-11 ENCOUNTER — HOSPITAL ENCOUNTER (OUTPATIENT)
Facility: MEDICAL CENTER | Age: 76
End: 2021-11-11
Attending: PHYSICIAN ASSISTANT
Payer: COMMERCIAL

## 2021-11-11 ENCOUNTER — OFFICE VISIT (OUTPATIENT)
Dept: URGENT CARE | Facility: CLINIC | Age: 76
End: 2021-11-11
Payer: MEDICARE

## 2021-11-11 VITALS
DIASTOLIC BLOOD PRESSURE: 80 MMHG | RESPIRATION RATE: 16 BRPM | BODY MASS INDEX: 24.56 KG/M2 | TEMPERATURE: 97.2 F | HEART RATE: 68 BPM | HEIGHT: 71 IN | WEIGHT: 175.4 LBS | OXYGEN SATURATION: 97 % | SYSTOLIC BLOOD PRESSURE: 106 MMHG

## 2021-11-11 DIAGNOSIS — R35.0 URINARY FREQUENCY: ICD-10-CM

## 2021-11-11 LAB
APPEARANCE UR: NORMAL
BILIRUB UR STRIP-MCNC: NORMAL MG/DL
COLOR UR AUTO: YELLOW
GLUCOSE UR STRIP.AUTO-MCNC: NORMAL MG/DL
KETONES UR STRIP.AUTO-MCNC: NORMAL MG/DL
LEUKOCYTE ESTERASE UR QL STRIP.AUTO: NORMAL
NITRITE UR QL STRIP.AUTO: NORMAL
PH UR STRIP.AUTO: 6.5 [PH] (ref 5–8)
PROT UR QL STRIP: NORMAL MG/DL
RBC UR QL AUTO: NORMAL
SP GR UR STRIP.AUTO: 1.01
UROBILINOGEN UR STRIP-MCNC: 0.2 MG/DL

## 2021-11-11 PROCEDURE — 87086 URINE CULTURE/COLONY COUNT: CPT

## 2021-11-11 PROCEDURE — 81002 URINALYSIS NONAUTO W/O SCOPE: CPT | Performed by: PHYSICIAN ASSISTANT

## 2021-11-11 PROCEDURE — 99213 OFFICE O/P EST LOW 20 MIN: CPT | Performed by: PHYSICIAN ASSISTANT

## 2021-11-11 ASSESSMENT — FIBROSIS 4 INDEX: FIB4 SCORE: 1.14

## 2021-11-11 ASSESSMENT — ENCOUNTER SYMPTOMS
SHORTNESS OF BREATH: 0
FEVER: 0
EYE DISCHARGE: 0
VOMITING: 0
EYE REDNESS: 0
COUGH: 0
ABDOMINAL PAIN: 0
NAUSEA: 0
FLANK PAIN: 0

## 2021-11-12 DIAGNOSIS — R35.0 URINARY FREQUENCY: ICD-10-CM

## 2021-11-12 NOTE — PROGRESS NOTES
Subjective     Fara Chavira is a 76 y.o. male who presents with Urinary Frequency (x last nigt, urinary frequency, buring with urination)            This is a new problem.  The patient presents to clinic complaining of urinary frequency x1 day.  The patient states he is experiencing increased urinary frequency, stating he is needing to urinate approximately once per hour.  The patient states earlier today he developed a slight burning with urination.  The patient reports no associated hematuria.  He also reports no fever.  No flank pain.  No abdominal pain.  No nausea/vomiting.  The patient notes no urinary retention.  The patient has not taken any OTC medications for his current symptoms.  The patient reports a history of BPH.  The patient is currently taking Alfluzin for his enlarged prostate.  The patient states his urologist more recently prescribed him Solifenacin, which he started 2 days ago.     Urinary Frequency  This is a new problem. Episode onset: x 1 day ago. The problem occurs constantly. The problem has been unchanged. Associated symptoms include urinary symptoms (The patient reports associated urinary frequency with slight burning with urination.). Pertinent negatives include no abdominal pain, chest pain, congestion, coughing, fever, nausea, rash or vomiting. He has tried nothing for the symptoms.     PMH:  has a past medical history of B12 deficiency (1/29/2020), Diabetes ([facility]), Diarrhea (1/29/2020), Hyperlipidemia, Hypertension, and Vitamin D deficiency (1/29/2020). He also has no past medical history of Encounter for long-term (current) use of other medications.  MEDS:   Current Outpatient Medications:   •  glipiZIDE SR (GLUCOTROL) 5 MG TABLET SR 24 HR, TAKE ONE TABLET BY MOUTH EVERY DAY, Disp: 90 Tablet, Rfl: 0  •  metFORMIN ER (GLUCOPHAGE XR) 500 MG TABLET SR 24 HR, TAKE TWO TABLETS BY MOUTH TWICE A DAY, Disp: 360 Tablet, Rfl: 0  •  FLUoxetine (PROZAC) 20 MG Cap, TAKE ONE CAPSULE BY MOUTH  "EVERY DAY, Disp: 90 Capsule, Rfl: 3  •  simvastatin (ZOCOR) 20 MG Tab, TAKE ONE TABLET BY MOUTH EVERY EVENING, Disp: 90 tablet, Rfl: 3  •  Continuous Blood Gluc Sensor (FREESTYLE ESDRAS 14 DAY SENSOR) Misc, 1 Each by Does not apply route every 14 days., Disp: 1 Each, Rfl: 3  •  alfuzosin (UROXATRAL) 10 MG SR tablet, , Disp: , Rfl:   •  cholecalciferol (VITAMIN D3) 400 UNIT Tab, Take 400 Units by mouth every day., Disp: , Rfl:   •  ASPIRIN, by Does not apply route., Disp: , Rfl:   ALLERGIES:   Allergies   Allergen Reactions   • Mushroom Extract Complex Vomiting   • Pcn [Penicillins] Rash     On chest     SURGHX:   Past Surgical History:   Procedure Laterality Date   • OTHER ABDOMINAL SURGERY      Bowel surgery d/t intusussception (doesn't know LI vs. SI, no GI complaints now)     SOCHX:  reports that he quit smoking about 49 years ago. He smoked 0.00 packs per day. He has never used smokeless tobacco. He reports that he does not drink alcohol and does not use drugs.  FH: Family history was reviewed, no pertinent findings to report    Review of Systems   Constitutional: Negative for fever.   HENT: Negative for congestion.    Eyes: Negative for discharge and redness.   Respiratory: Negative for cough and shortness of breath.    Cardiovascular: Negative for chest pain and leg swelling.   Gastrointestinal: Negative for abdominal pain, nausea and vomiting.   Genitourinary: Positive for dysuria and frequency. Negative for flank pain and hematuria.   Skin: Negative for rash.              Objective     /80 (BP Location: Left arm, Patient Position: Sitting, BP Cuff Size: Adult)   Pulse 68   Temp 36.2 °C (97.2 °F) (Temporal)   Resp 16   Ht 1.803 m (5' 11\")   Wt 79.6 kg (175 lb 6.4 oz)   SpO2 97%   BMI 24.46 kg/m²      Physical Exam  Constitutional:       General: He is not in acute distress.     Appearance: Normal appearance. He is well-developed. He is not ill-appearing.   HENT:      Head: Normocephalic and " atraumatic.      Right Ear: External ear normal.      Left Ear: External ear normal.   Eyes:      Extraocular Movements: Extraocular movements intact.      Conjunctiva/sclera: Conjunctivae normal.   Cardiovascular:      Rate and Rhythm: Normal rate and regular rhythm.      Heart sounds: Normal heart sounds.   Pulmonary:      Effort: Pulmonary effort is normal. No respiratory distress.      Breath sounds: Normal breath sounds. No wheezing.   Abdominal:      Palpations: Abdomen is soft.      Tenderness: There is no abdominal tenderness. There is no right CVA tenderness or left CVA tenderness.   Musculoskeletal:      Cervical back: Normal range of motion and neck supple.   Skin:     General: Skin is warm and dry.   Neurological:      Mental Status: He is alert and oriented to person, place, and time.            Progress:  Results for orders placed or performed in visit on 11/11/21   POCT Urinalysis   Result Value Ref Range    POC Color yellow Negative    POC Appearance slightly cloudy Negative    POC Leukocyte Esterase neg Negative    POC Nitrites neg Negative    POC Urobiligen 0.2 Negative (0.2) mg/dL    POC Protein neg Negative mg/dL    POC Urine PH 6.5 5.0 - 8.0    POC Blood neg Negative    POC Specific Gravity 1.015 <1.005 - >1.030    POC Ketones neg Negative mg/dL    POC Bilirubin neg Negative mg/dL    POC Glucose neg Negative mg/dL     Urine Culture - pending                  Assessment & Plan        1. Urinary frequency  - POCT Urinalysis  - URINE CULTURE(NEW); Future    The patient's presenting symptoms and physical exam endings are consistent with urinary frequency.  The patient's physical exam today in clinic was normal.  No CVA tenderness was appreciated.  The patient appears in no acute distress.  The patient's vital signs are stable and within normal limits.  He is afebrile today in clinic.  The patient's POCT urinalysis today in clinic was normal with negative leukocyte esterase, negative blood, and  negative nitrites.  Will culture the patient's urine to rule out a possible bacterial infection.  Will await the results of the urine culture prior to starting antibiotic treatment, as I have low clinical suspicion for an acute UTI.  The patient's urinary frequency may be related to his known BPH.  Additionally, the patient's urinary frequency could be a side effect of the new medication (Solifenacin).  Instruct the patient he may stop this medication to see if his symptoms improve.  Advised the patient to follow-up with his urologist.  Recommend OTC medications and supportive care for symptomatic management.  Recommend patient follow-up with his PCP as needed.  Discussed return precautions with the patient, and he verbalized understanding.    Differential diagnoses, supportive care, and indications for immediate follow-up discussed with patient.   Instructed to return to clinic or nearest emergency department for any change in condition, further concerns, or worsening of symptoms.    OTC Tylenol or Motrin for fever/discomfort.  Drink plenty of fluids  Follow-up with PCP  Return to clinic or go to the ED if symptoms worsen or fail to improve, or if the patient should develop worsening/increasing urinary symptoms, hematuria, flank pain, abdominal pain, nausea/vomiting, fever/chills, and/or any concerning symptoms.    Discussed plan with the patient, and he agrees to the above.    I personally reviewed prior external notes and test results pertinent to today's visit.  I have independently reviewed and interpreted all diagnostics ordered during this urgent care visit.     Time spent evaluating this patient was at least 30 minutes and includes preparing for visit, obtaining history, exam and evaluation, ordering labs/tests/procedures/medications, independent interpretation, and counseling/education.    Please note that this dictation was created using voice recognition software. I have made every reasonable attempt to  correct obvious errors, but I expect that there may be errors of grammar and possibly content that I did not discover before finalizing the note.     This note was electronically signed by Ginna Ray PA-C

## 2021-11-14 LAB
BACTERIA UR CULT: NORMAL
SIGNIFICANT IND 70042: NORMAL
SITE SITE: NORMAL
SOURCE SOURCE: NORMAL

## 2022-02-10 DIAGNOSIS — E11.9 CONTROLLED TYPE 2 DIABETES MELLITUS WITHOUT COMPLICATION, WITHOUT LONG-TERM CURRENT USE OF INSULIN (HCC): ICD-10-CM

## 2022-02-10 RX ORDER — GLIPIZIDE 5 MG/1
5 TABLET, FILM COATED, EXTENDED RELEASE ORAL
Qty: 90 TABLET | Refills: 1 | Status: SHIPPED | OUTPATIENT
Start: 2022-02-10 | End: 2022-03-01 | Stop reason: SDUPTHER

## 2022-02-28 SDOH — ECONOMIC STABILITY: FOOD INSECURITY: WITHIN THE PAST 12 MONTHS, THE FOOD YOU BOUGHT JUST DIDN'T LAST AND YOU DIDN'T HAVE MONEY TO GET MORE.: NEVER TRUE

## 2022-02-28 SDOH — HEALTH STABILITY: PHYSICAL HEALTH: ON AVERAGE, HOW MANY MINUTES DO YOU ENGAGE IN EXERCISE AT THIS LEVEL?: 0 MIN

## 2022-02-28 SDOH — ECONOMIC STABILITY: HOUSING INSECURITY: IN THE LAST 12 MONTHS, HOW MANY PLACES HAVE YOU LIVED?: 1

## 2022-02-28 SDOH — ECONOMIC STABILITY: INCOME INSECURITY: HOW HARD IS IT FOR YOU TO PAY FOR THE VERY BASICS LIKE FOOD, HOUSING, MEDICAL CARE, AND HEATING?: NOT HARD AT ALL

## 2022-02-28 SDOH — ECONOMIC STABILITY: TRANSPORTATION INSECURITY
IN THE PAST 12 MONTHS, HAS LACK OF TRANSPORTATION KEPT YOU FROM MEETINGS, WORK, OR FROM GETTING THINGS NEEDED FOR DAILY LIVING?: NO

## 2022-02-28 SDOH — ECONOMIC STABILITY: FOOD INSECURITY: WITHIN THE PAST 12 MONTHS, YOU WORRIED THAT YOUR FOOD WOULD RUN OUT BEFORE YOU GOT MONEY TO BUY MORE.: NEVER TRUE

## 2022-02-28 SDOH — ECONOMIC STABILITY: INCOME INSECURITY: IN THE LAST 12 MONTHS, WAS THERE A TIME WHEN YOU WERE NOT ABLE TO PAY THE MORTGAGE OR RENT ON TIME?: NO

## 2022-02-28 SDOH — HEALTH STABILITY: PHYSICAL HEALTH: ON AVERAGE, HOW MANY DAYS PER WEEK DO YOU ENGAGE IN MODERATE TO STRENUOUS EXERCISE (LIKE A BRISK WALK)?: 0 DAYS

## 2022-02-28 ASSESSMENT — SOCIAL DETERMINANTS OF HEALTH (SDOH)
HOW HARD IS IT FOR YOU TO PAY FOR THE VERY BASICS LIKE FOOD, HOUSING, MEDICAL CARE, AND HEATING?: NOT HARD AT ALL
HOW OFTEN DO YOU GET TOGETHER WITH FRIENDS OR RELATIVES?: PATIENT DECLINED
HOW OFTEN DO YOU HAVE SIX OR MORE DRINKS ON ONE OCCASION: NEVER
HOW MANY DRINKS CONTAINING ALCOHOL DO YOU HAVE ON A TYPICAL DAY WHEN YOU ARE DRINKING: 1 OR 2
DO YOU BELONG TO ANY CLUBS OR ORGANIZATIONS SUCH AS CHURCH GROUPS UNIONS, FRATERNAL OR ATHLETIC GROUPS, OR SCHOOL GROUPS?: NO
DO YOU BELONG TO ANY CLUBS OR ORGANIZATIONS SUCH AS CHURCH GROUPS UNIONS, FRATERNAL OR ATHLETIC GROUPS, OR SCHOOL GROUPS?: NO
HOW OFTEN DO YOU ATTEND CHURCH OR RELIGIOUS SERVICES?: NEVER
HOW OFTEN DO YOU HAVE A DRINK CONTAINING ALCOHOL: 2-4 TIMES A MONTH
HOW OFTEN DO YOU ATTEND CHURCH OR RELIGIOUS SERVICES?: NEVER
IN A TYPICAL WEEK, HOW MANY TIMES DO YOU TALK ON THE PHONE WITH FAMILY, FRIENDS, OR NEIGHBORS?: TWICE A WEEK
HOW OFTEN DO YOU ATTENT MEETINGS OF THE CLUB OR ORGANIZATION YOU BELONG TO?: NEVER
HOW OFTEN DO YOU ATTENT MEETINGS OF THE CLUB OR ORGANIZATION YOU BELONG TO?: NEVER
IN A TYPICAL WEEK, HOW MANY TIMES DO YOU TALK ON THE PHONE WITH FAMILY, FRIENDS, OR NEIGHBORS?: TWICE A WEEK
HOW OFTEN DO YOU GET TOGETHER WITH FRIENDS OR RELATIVES?: PATIENT DECLINED
WITHIN THE PAST 12 MONTHS, YOU WORRIED THAT YOUR FOOD WOULD RUN OUT BEFORE YOU GOT THE MONEY TO BUY MORE: NEVER TRUE

## 2022-02-28 ASSESSMENT — LIFESTYLE VARIABLES
HOW OFTEN DO YOU HAVE SIX OR MORE DRINKS ON ONE OCCASION: NEVER
HOW OFTEN DO YOU HAVE A DRINK CONTAINING ALCOHOL: 2-4 TIMES A MONTH
HOW MANY STANDARD DRINKS CONTAINING ALCOHOL DO YOU HAVE ON A TYPICAL DAY: 1 OR 2

## 2022-03-01 ENCOUNTER — OFFICE VISIT (OUTPATIENT)
Dept: MEDICAL GROUP | Facility: MEDICAL CENTER | Age: 77
End: 2022-03-01
Payer: MEDICARE

## 2022-03-01 VITALS
HEIGHT: 71 IN | SYSTOLIC BLOOD PRESSURE: 110 MMHG | OXYGEN SATURATION: 97 % | TEMPERATURE: 98 F | HEART RATE: 87 BPM | BODY MASS INDEX: 24.64 KG/M2 | WEIGHT: 176 LBS | DIASTOLIC BLOOD PRESSURE: 64 MMHG

## 2022-03-01 DIAGNOSIS — E11.69 HYPERLIPIDEMIA ASSOCIATED WITH TYPE 2 DIABETES MELLITUS (HCC): ICD-10-CM

## 2022-03-01 DIAGNOSIS — F32.0 MILD SINGLE CURRENT EPISODE OF MAJOR DEPRESSIVE DISORDER (HCC): ICD-10-CM

## 2022-03-01 DIAGNOSIS — R35.0 BENIGN PROSTATIC HYPERPLASIA WITH URINARY FREQUENCY: ICD-10-CM

## 2022-03-01 DIAGNOSIS — E11.9 CONTROLLED TYPE 2 DIABETES MELLITUS WITHOUT COMPLICATION, WITHOUT LONG-TERM CURRENT USE OF INSULIN (HCC): ICD-10-CM

## 2022-03-01 DIAGNOSIS — Z13.0 SCREENING FOR DEFICIENCY ANEMIA: ICD-10-CM

## 2022-03-01 DIAGNOSIS — N40.1 BENIGN PROSTATIC HYPERPLASIA WITH URINARY FREQUENCY: ICD-10-CM

## 2022-03-01 DIAGNOSIS — F39 MOOD DISORDER (HCC): ICD-10-CM

## 2022-03-01 DIAGNOSIS — E78.5 HYPERLIPIDEMIA ASSOCIATED WITH TYPE 2 DIABETES MELLITUS (HCC): ICD-10-CM

## 2022-03-01 PROBLEM — R35.1 NOCTURIA MORE THAN TWICE PER NIGHT: Status: RESOLVED | Noted: 2018-02-14 | Resolved: 2022-03-01

## 2022-03-01 PROCEDURE — 99214 OFFICE O/P EST MOD 30 MIN: CPT | Performed by: STUDENT IN AN ORGANIZED HEALTH CARE EDUCATION/TRAINING PROGRAM

## 2022-03-01 RX ORDER — METFORMIN HYDROCHLORIDE 500 MG/1
1000 TABLET, EXTENDED RELEASE ORAL 2 TIMES DAILY
Qty: 360 TABLET | Refills: 2 | Status: SHIPPED | OUTPATIENT
Start: 2022-03-01 | End: 2022-09-26 | Stop reason: SDUPTHER

## 2022-03-01 RX ORDER — GLIPIZIDE 5 MG/1
5 TABLET, FILM COATED, EXTENDED RELEASE ORAL
Qty: 90 TABLET | Refills: 1 | Status: SHIPPED | OUTPATIENT
Start: 2022-03-01 | End: 2022-09-26 | Stop reason: SDUPTHER

## 2022-03-01 RX ORDER — FLUOXETINE HYDROCHLORIDE 20 MG/1
20 CAPSULE ORAL
Qty: 90 CAPSULE | Refills: 3 | Status: SHIPPED | OUTPATIENT
Start: 2022-03-01 | End: 2022-09-26 | Stop reason: SDUPTHER

## 2022-03-01 RX ORDER — SIMVASTATIN 20 MG
20 TABLET ORAL EVERY EVENING
Qty: 90 TABLET | Refills: 3 | Status: SHIPPED | OUTPATIENT
Start: 2022-03-01 | End: 2022-09-26 | Stop reason: SDUPTHER

## 2022-03-01 ASSESSMENT — ENCOUNTER SYMPTOMS
SHORTNESS OF BREATH: 0
CHILLS: 0
FEVER: 0

## 2022-03-02 NOTE — ASSESSMENT & PLAN NOTE
Chronic stable  -continue glipizide and metformin  -patient previously did not tolerate metformin higher doses or Januvia

## 2022-03-02 NOTE — PROGRESS NOTES
"Subjective:     CC: Establish Patient    HPI:   Fara presents today for the following;    Problem   Benign Prostatic Hyperplasia With Urinary Frequency    Patient is alfuzosin and following with urology     (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 Mood is doing well on the current regimen. No SI/HI. No side effects from the medication.               Current Outpatient Medications Ordered in Epic   Medication Sig Dispense Refill   • simvastatin (ZOCOR) 20 MG Tab Take 1 Tablet by mouth every evening. 90 Tablet 3   • metFORMIN ER (GLUCOPHAGE XR) 500 MG TABLET SR 24 HR Take 2 Tablets by mouth 2 times a day. 360 Tablet 2   • glipiZIDE SR (GLUCOTROL) 5 MG TABLET SR 24 HR Take 1 Tablet by mouth every day. 90 Tablet 1   • FLUoxetine (PROZAC) 20 MG Cap Take 1 Capsule by mouth every day. 90 Capsule 3   • alfuzosin (UROXATRAL) 10 MG SR tablet      • cholecalciferol (VITAMIN D3) 400 UNIT Tab Take 400 Units by mouth every day.     • ASPIRIN by Does not apply route.       No current [facility]-ordered facility-administered medications on file.           ROS:  Review of Systems   Constitutional: Negative for chills and fever.   Respiratory: Negative for shortness of breath.    Cardiovascular: Negative for chest pain.       Objective:     Exam:  /64 (BP Location: Left arm, Patient Position: Sitting, BP Cuff Size: Adult)   Pulse 87   Temp 36.7 °C (98 °F) (Temporal)   Ht 1.803 m (5' 11\")   Wt 79.8 kg (176 lb)   SpO2 97%   BMI 24.55 kg/m²  Body mass index is 24.55 kg/m².    Physical Exam  Constitutional:       Appearance: Normal appearance.   Cardiovascular:      Rate and Rhythm: Normal rate and regular rhythm.      Heart sounds: Normal heart sounds.   Pulmonary:      Effort: Pulmonary effort is normal.      Breath sounds: Normal breath sounds.   Musculoskeletal:      Cervical back: Normal range of motion and neck supple. "   Neurological:      Mental Status: He is alert.               Assessment & Plan:     Problem List Items Addressed This Visit     Controlled type 2 diabetes mellitus without complication, without long-term current use of insulin (HCC)     Chronic stable  -continue glipizide and metformin  -patient previously did not tolerate metformin higher doses or Januvia         Relevant Medications    metFORMIN ER (GLUCOPHAGE XR) 500 MG TABLET SR 24 HR    glipiZIDE SR (GLUCOTROL) 5 MG TABLET SR 24 HR    Other Relevant Orders    HEMOGLOBIN A1C    Comp Metabolic Panel    MICROALBUM. UR RANDOM    (HCC) Hyperlipidemia associated with type 2 diabetes mellitus    Relevant Medications    simvastatin (ZOCOR) 20 MG Tab    metFORMIN ER (GLUCOPHAGE XR) 500 MG TABLET SR 24 HR    glipiZIDE SR (GLUCOTROL) 5 MG TABLET SR 24 HR    Other Relevant Orders    Lipid Profile    (HCC) Mild single current episode of major depressive disorder     Chronic-stable  -continue prozac for now, consider stopping in the future          Relevant Medications    FLUoxetine (PROZAC) 20 MG Cap    Benign prostatic hyperplasia with urinary frequency      Other Visit Diagnoses     Mood disorder (HCC)        Relevant Medications    FLUoxetine (PROZAC) 20 MG Cap    Screening for deficiency anemia        Relevant Orders    CBC WITH DIFFERENTIAL              No follow-ups on file.    Please note that this dictation was created using voice recognition software. I have made every reasonable attempt to correct obvious errors, but I expect that there are errors of grammar and possibly content that I did not discover before finalizing the note.

## 2022-06-28 ENCOUNTER — APPOINTMENT (OUTPATIENT)
Dept: MEDICAL GROUP | Facility: MEDICAL CENTER | Age: 77
End: 2022-06-28
Payer: COMMERCIAL

## 2022-07-11 ENCOUNTER — OFFICE VISIT (OUTPATIENT)
Dept: URGENT CARE | Facility: CLINIC | Age: 77
End: 2022-07-11
Payer: MEDICARE

## 2022-07-11 ENCOUNTER — HOSPITAL ENCOUNTER (OUTPATIENT)
Facility: MEDICAL CENTER | Age: 77
End: 2022-07-11
Attending: PHYSICIAN ASSISTANT
Payer: MEDICARE

## 2022-07-11 VITALS
SYSTOLIC BLOOD PRESSURE: 110 MMHG | WEIGHT: 174.8 LBS | TEMPERATURE: 97.6 F | OXYGEN SATURATION: 97 % | DIASTOLIC BLOOD PRESSURE: 70 MMHG | RESPIRATION RATE: 16 BRPM | HEIGHT: 70 IN | BODY MASS INDEX: 25.03 KG/M2 | HEART RATE: 74 BPM

## 2022-07-11 DIAGNOSIS — E11.9 CONTROLLED TYPE 2 DIABETES MELLITUS WITHOUT COMPLICATION, WITHOUT LONG-TERM CURRENT USE OF INSULIN (HCC): ICD-10-CM

## 2022-07-11 DIAGNOSIS — N40.1 BENIGN PROSTATIC HYPERPLASIA WITH URINARY FREQUENCY: ICD-10-CM

## 2022-07-11 DIAGNOSIS — R35.0 BENIGN PROSTATIC HYPERPLASIA WITH URINARY FREQUENCY: ICD-10-CM

## 2022-07-11 DIAGNOSIS — N30.00 ACUTE CYSTITIS WITHOUT HEMATURIA: ICD-10-CM

## 2022-07-11 LAB
APPEARANCE UR: CLEAR
BILIRUB UR STRIP-MCNC: NEGATIVE MG/DL
COLOR UR AUTO: YELLOW
GLUCOSE BLD-MCNC: 243 MG/DL (ref 70–100)
GLUCOSE UR STRIP.AUTO-MCNC: 100 MG/DL
KETONES UR STRIP.AUTO-MCNC: NEGATIVE MG/DL
LEUKOCYTE ESTERASE UR QL STRIP.AUTO: NEGATIVE
NITRITE UR QL STRIP.AUTO: NEGATIVE
PH UR STRIP.AUTO: 5.5 [PH] (ref 5–8)
PROT UR QL STRIP: NEGATIVE MG/DL
RBC UR QL AUTO: NEGATIVE
SP GR UR STRIP.AUTO: 1.01
UROBILINOGEN UR STRIP-MCNC: 0.2 MG/DL

## 2022-07-11 PROCEDURE — 87077 CULTURE AEROBIC IDENTIFY: CPT

## 2022-07-11 PROCEDURE — 99214 OFFICE O/P EST MOD 30 MIN: CPT | Performed by: PHYSICIAN ASSISTANT

## 2022-07-11 PROCEDURE — 81002 URINALYSIS NONAUTO W/O SCOPE: CPT | Performed by: PHYSICIAN ASSISTANT

## 2022-07-11 PROCEDURE — 82962 GLUCOSE BLOOD TEST: CPT | Performed by: PHYSICIAN ASSISTANT

## 2022-07-11 PROCEDURE — 87186 SC STD MICRODIL/AGAR DIL: CPT

## 2022-07-11 PROCEDURE — 87086 URINE CULTURE/COLONY COUNT: CPT

## 2022-07-11 RX ORDER — SOLIFENACIN SUCCINATE 10 MG/1
10 TABLET, FILM COATED ORAL DAILY
COMMUNITY
End: 2022-09-19

## 2022-07-11 RX ORDER — MIRABEGRON 25 MG/1
TABLET, FILM COATED, EXTENDED RELEASE ORAL
COMMUNITY
End: 2022-07-11

## 2022-07-11 ASSESSMENT — ENCOUNTER SYMPTOMS
FLANK PAIN: 0
CHILLS: 0
FEVER: 0
ABDOMINAL PAIN: 0

## 2022-07-11 NOTE — PROGRESS NOTES
Subjective:   Fara Chavira is a 76 y.o. male who presents today with   Chief Complaint   Patient presents with   • Urinary Frequency     Frequency, slight burning while urinating, no fever, no kidney pain or no lower pelvic discomfort. Onset yesterday evening. Tx: none.        Urinary Frequency  This is a new problem. The current episode started yesterday. The problem occurs constantly. The problem has been unchanged. Associated symptoms include urinary symptoms. Pertinent negatives include no abdominal pain, chills or fever. He has tried nothing for the symptoms. The treatment provided no relief.   Patient does see urology [place] in follow up. States he does not regularly check his blood sugar.  He does have urinary urgency due to BPH.    PMH:  has a past medical history of B12 deficiency (1/29/2020), Diabetes (HCC), Diarrhea (1/29/2020), Hyperlipidemia, Hypertension, and Vitamin D deficiency (1/29/2020).    He has no past medical history of Encounter for long-term (current) use of other medications.  MEDS:   Current Outpatient Medications:   •  ASPIRIN 81 PO, Take 1 Each by mouth every day at 6 PM., Disp: , Rfl:   •  simvastatin (ZOCOR) 20 MG Tab, Take 1 Tablet by mouth every evening., Disp: 90 Tablet, Rfl: 3  •  metFORMIN ER (GLUCOPHAGE XR) 500 MG TABLET SR 24 HR, Take 2 Tablets by mouth 2 times a day., Disp: 360 Tablet, Rfl: 2  •  glipiZIDE SR (GLUCOTROL) 5 MG TABLET SR 24 HR, Take 1 Tablet by mouth every day., Disp: 90 Tablet, Rfl: 1  •  FLUoxetine (PROZAC) 20 MG Cap, Take 1 Capsule by mouth every day., Disp: 90 Capsule, Rfl: 3  •  cholecalciferol (VITAMIN D3) 400 UNIT Tab, Take 400 Units by mouth every day., Disp: , Rfl:   •  solifenacin (VESICARE) 10 MG tablet, Take 10 mg by mouth every day., Disp: , Rfl:   •  alfuzosin (UROXATRAL) 10 MG SR tablet, Take 10 mg by mouth every day., Disp: , Rfl:   ALLERGIES:   Allergies   Allergen Reactions   • Mushroom Extract Complex Vomiting   • Pcn [Penicillins] Rash     " On chest     SURGHX:   Past Surgical History:   Procedure Laterality Date   • OTHER ABDOMINAL SURGERY      Bowel surgery d/t intusussception (doesn't know LI vs. SI, no GI complaints now)     SOCHX:  reports that he quit smoking about 50 years ago. He smoked 0.00 packs per day. He has never used smokeless tobacco. He reports that he does not drink alcohol and does not use drugs.  FH: Reviewed with patient, not pertinent to this visit.       Review of Systems   Constitutional: Negative for chills and fever.   Gastrointestinal: Negative for abdominal pain.   Genitourinary: Positive for dysuria, frequency and urgency. Negative for flank pain and hematuria.        Objective:   /70 (BP Location: Left arm, Patient Position: Sitting, BP Cuff Size: Adult)   Pulse 74   Temp 36.4 °C (97.6 °F) (Temporal)   Resp 16   Ht 1.778 m (5' 10\")   Wt 79.3 kg (174 lb 12.8 oz)   SpO2 97%   BMI 25.08 kg/m²   Physical Exam  Vitals and nursing note reviewed.   Constitutional:       General: He is not in acute distress.     Appearance: Normal appearance. He is well-developed. He is not ill-appearing or toxic-appearing.   HENT:      Head: Normocephalic and atraumatic.      Right Ear: Hearing normal.      Left Ear: Hearing normal.   Cardiovascular:      Rate and Rhythm: Normal rate.   Pulmonary:      Effort: Pulmonary effort is normal.   Abdominal:      Tenderness: There is no abdominal tenderness. There is no right CVA tenderness or left CVA tenderness.   Genitourinary:     Comments: Patient deferred  exam  Musculoskeletal:      Comments: Normal movement in all 4 extremities   Skin:     General: Skin is warm and dry.   Neurological:      Mental Status: He is alert.      Coordination: Coordination normal.   Psychiatric:         Mood and Affect: Mood normal.         UA negative  GLUCOSE 243 patient states he did just eat not too long ago.  Previous A1c from 1 year ago was at 7.4.  Assessment/Plan:   Assessment    1. Benign " prostatic hyperplasia with urinary frequency  - POCT Urinalysis  - URINE CULTURE(NEW); Future    2. Controlled type 2 diabetes mellitus without complication, without long-term current use of insulin ([facility])  - POCT Glucose  - Referral back to [facility] PCP    Other orders  - solifenacin (VESICARE) 10 MG tablet; Take 10 mg by mouth every day.  - ASPIRIN 81 PO; Take 1 Each by mouth every day at 6 PM.  No indication for infection at this time but we will follow-up with urine culture.  Recommend patient follow-up with urology and also recommend follow-up with primary care given his elevated blood sugar on exam today.  No acute symptoms on exam regarding his elevated blood sugar.  Continue with previously prescribed diabetic medication until follow-up with primary care.    Differential diagnosis, natural history, supportive care, and indications for immediate follow-up discussed.   Patient given instructions and understanding of medications and treatment.    If not improving in 3-5 days, F/U with PCP or return to UC if symptoms worsen.    Patient agreeable to plan.      Please note that this dictation was created using voice recognition software. I have made every reasonable attempt to correct obvious errors, but I expect that there are errors of grammar and possibly content that I did not discover before finalizing the note.    Richard Gandara PA-C

## 2022-07-12 DIAGNOSIS — N40.1 BENIGN PROSTATIC HYPERPLASIA WITH URINARY FREQUENCY: ICD-10-CM

## 2022-07-12 DIAGNOSIS — R35.0 BENIGN PROSTATIC HYPERPLASIA WITH URINARY FREQUENCY: ICD-10-CM

## 2022-07-14 ENCOUNTER — TELEPHONE (OUTPATIENT)
Dept: URGENT CARE | Facility: PHYSICIAN GROUP | Age: 77
End: 2022-07-14
Payer: MEDICARE

## 2022-07-14 LAB
BACTERIA UR CULT: ABNORMAL
BACTERIA UR CULT: ABNORMAL
SIGNIFICANT IND 70042: ABNORMAL
SITE SITE: ABNORMAL
SOURCE SOURCE: ABNORMAL

## 2022-07-14 RX ORDER — CEFDINIR 300 MG/1
300 CAPSULE ORAL 2 TIMES DAILY
Qty: 14 CAPSULE | Refills: 0 | Status: SHIPPED | OUTPATIENT
Start: 2022-07-14 | End: 2022-07-21

## 2022-07-14 NOTE — TELEPHONE ENCOUNTER
Called and discussed results with patient and based on susceptibilities we will send in cephalosporin at this time.  Patient is understanding of possible side effects and to follow-up with urology or primary care if symptoms are not improving.  Patient is understanding of instructions and appreciative of call at this time.

## 2022-09-19 ENCOUNTER — OFFICE VISIT (OUTPATIENT)
Dept: MEDICAL GROUP | Facility: PHYSICIAN GROUP | Age: 77
End: 2022-09-19
Payer: MEDICARE

## 2022-09-19 VITALS
RESPIRATION RATE: 12 BRPM | SYSTOLIC BLOOD PRESSURE: 110 MMHG | OXYGEN SATURATION: 97 % | TEMPERATURE: 97.9 F | WEIGHT: 173.6 LBS | HEART RATE: 66 BPM | HEIGHT: 70 IN | BODY MASS INDEX: 24.85 KG/M2 | DIASTOLIC BLOOD PRESSURE: 62 MMHG

## 2022-09-19 DIAGNOSIS — Z00.00 ENCOUNTER FOR SUBSEQUENT ANNUAL WELLNESS VISIT (AWV) IN MEDICARE PATIENT: Primary | ICD-10-CM

## 2022-09-19 DIAGNOSIS — F32.0 MILD SINGLE CURRENT EPISODE OF MAJOR DEPRESSIVE DISORDER (HCC): ICD-10-CM

## 2022-09-19 DIAGNOSIS — E11.65 UNCONTROLLED TYPE 2 DIABETES MELLITUS WITH HYPERGLYCEMIA (HCC): ICD-10-CM

## 2022-09-19 DIAGNOSIS — E78.5 HYPERLIPIDEMIA ASSOCIATED WITH TYPE 2 DIABETES MELLITUS (HCC): ICD-10-CM

## 2022-09-19 DIAGNOSIS — Z12.5 ENCOUNTER FOR SCREENING FOR MALIGNANT NEOPLASM OF PROSTATE: ICD-10-CM

## 2022-09-19 DIAGNOSIS — Z23 NEED FOR VACCINATION: ICD-10-CM

## 2022-09-19 DIAGNOSIS — E11.69 HYPERLIPIDEMIA ASSOCIATED WITH TYPE 2 DIABETES MELLITUS (HCC): ICD-10-CM

## 2022-09-19 LAB
HBA1C MFR BLD: 8 % (ref 0–5.6)
INT CON NEG: NEGATIVE
INT CON POS: POSITIVE

## 2022-09-19 PROCEDURE — G0008 ADMIN INFLUENZA VIRUS VAC: HCPCS | Performed by: INTERNAL MEDICINE

## 2022-09-19 PROCEDURE — G0439 PPPS, SUBSEQ VISIT: HCPCS | Mod: 25 | Performed by: INTERNAL MEDICINE

## 2022-09-19 PROCEDURE — 90662 IIV NO PRSV INCREASED AG IM: CPT | Performed by: INTERNAL MEDICINE

## 2022-09-19 PROCEDURE — 83036 HEMOGLOBIN GLYCOSYLATED A1C: CPT | Performed by: INTERNAL MEDICINE

## 2022-09-19 RX ORDER — CALCIUM CARBONATE/VITAMIN D3 600MG-62.5
CAPSULE ORAL
COMMUNITY
End: 2023-08-08

## 2022-09-19 RX ORDER — CEFDINIR 300 MG/1
CAPSULE ORAL
COMMUNITY
End: 2022-09-19

## 2022-09-19 ASSESSMENT — PATIENT HEALTH QUESTIONNAIRE - PHQ9: CLINICAL INTERPRETATION OF PHQ2 SCORE: 0

## 2022-09-19 ASSESSMENT — ENCOUNTER SYMPTOMS: GENERAL WELL-BEING: EXCELLENT

## 2022-09-19 ASSESSMENT — ACTIVITIES OF DAILY LIVING (ADL): BATHING_REQUIRES_ASSISTANCE: 0

## 2022-09-19 NOTE — LETTER
[facility]  Melissa Miller D.O.  [address]  [place] [address]  Fax: [phone]   Authorization for Release/Disclosure of   Protected Health Information   Name: FARA ALONZO : 1945 SSN: [id number]   Address[address] [place], [place] [address] [place] [address] Phone:    [phone] (home) [phone] (work)   I authorize the entity listed below to release/disclose the PHI below to:   [facility] [employer]/Melissa Miller D.O. and Melissa Miller D.O.   Provider or Entity Name:  [facility]- Corinne Jewel Address   City, State, [facility]   Phone:      Fax:     Reason for request: continuity of care   Information to be released:    [  ] LAST COLONOSCOPY,  including any PATH REPORT and follow-up  [  ] LAST FIT/COLOGUARD RESULT [  ] LAST DEXA  [  ] LAST MAMMOGRAM  [  ] LAST PAP  [  ] LAST LABS [  ] RETINA EXAM REPORT  [  ] IMMUNIZATION RECORDS  [ x ] Release all info      [ x ] Check here and initial the line next to each item to release ALL health information INCLUDING  _____ Care and treatment for drug and / or alcohol abuse  _____ HIV testing, infection status, or AIDS  _____ Genetic Testing    DATES OF SERVICE OR TIME PERIOD TO BE DISCLOSED: __-___________  I understand and acknowledge that:  * This Authorization may be revoked at any time by you in writing, except if your health information has already been used or disclosed.  * Your health information that will be used or disclosed as a result of you signing this authorization could be re-disclosed by the recipient. If this occurs, your re-disclosed health information may no longer be protected by State or Federal laws.  * You may refuse to sign this Authorization. Your refusal will not affect your ability to obtain treatment.  * This Authorization becomes effective upon signing and will  on (date) __________.      If no date is indicated, this Authorization will  one (1) year from the signature date.    Name: Fara  Fan    Signature:   Date:     9/19/2022       PLEASE FAX REQUESTED RECORDS BACK TO: [phone]

## 2022-09-19 NOTE — LETTER
[employer]  Melissa Miller D.O.  [address]  [place] [address]  Fax: [phone]   Authorization for Release/Disclosure of   Protected Health Information   Name: FARA ALONZO : 1945 SSN: [id number]   Address: [facility] [address]   [place] [address] Phone:    [phone] (home) [phone] (work)   I authorize the entity listed below to release/disclose the PHI below to:   [facility]/Melissa Miller D.O. and Melissa Miller D.O.   Provider or Entity Name:  Dr. Matt Jean Baptiste   Address   City, [facility]   Phone:      Fax:     Reason for request: continuity of care   Information to be released:    [  ] LAST COLONOSCOPY,  including any PATH REPORT and follow-up  [  ] LAST FIT/COLOGUARD RESULT [  ] LAST DEXA  [  ] LAST MAMMOGRAM  [  ] LAST PAP  [  ] LAST LABS [ x ] RETINA EXAM REPORT  [  ] IMMUNIZATION RECORDS  [ x ] Release all info      [ x ] Check here and initial the line next to each item to release ALL health information INCLUDING  _____ Care and treatment for drug and / or alcohol abuse  _____ HIV testing, infection status, or AIDS  _____ Genetic Testing    DATES OF SERVICE OR TIME PERIOD TO BE DISCLOSED: __-___________  I understand and acknowledge that:  * This Authorization may be revoked at any time by you in writing, except if your health information has already been used or disclosed.  * Your health information that will be used or disclosed as a result of you signing this authorization could be re-disclosed by the recipient. If this occurs, your re-disclosed health information may no longer be protected by State or Federal laws.  * You may refuse to sign this Authorization. Your refusal will not affect your ability to obtain treatment.  * This Authorization becomes effective upon signing and will  on (date) __________.      If no date is indicated, this Authorization will  one (1) year from the signature date.    Name: Fara  Fan    Signature:   Date:     9/19/2022       PLEASE FAX REQUESTED RECORDS BACK TO: [phone]

## 2022-09-20 NOTE — ASSESSMENT & PLAN NOTE
Chronic and ongoing problem, will recheck lipid panel to ensure stability on this regimen. Could consider transitioning to a higher potency statin, okay to continue simvastatin in the meantime.

## 2022-09-20 NOTE — ASSESSMENT & PLAN NOTE
Chronic and ongoing problem, continue fluoxetine 20 mg daily which is working well to control current symptoms.

## 2022-09-20 NOTE — ASSESSMENT & PLAN NOTE
Chronic and decompensated problem, he is not following a diabetic diet and ran out of his glipizide a few weeks ago. Will send in new prescriptions for metformin and glipizide. He will have updated eye exam in November 2022, no history of retinopathy. Foot exam without concern today. Order urine microalbumin. Recheck A1c in 3 months and if still above goal will add additional medicine. He is in agreeance with this plan.

## 2022-09-20 NOTE — PROGRESS NOTES
"Chief Complaint   Patient presents with    Establish Care     Many bouts of diarrhea  OK at home or eats at places that works for him irritable bowel symptoms if eats unusual things  other than that feels healthy   Going to hearing test on Friday  has some hearing lost   Appt in November for retinal screening          HPI:  Fara Chavira is a 76 y.o. male here for Medicare Annual Wellness Visit. He is accompanied by his wife.    Problem   Uncontrolled Type 2 Diabetes Mellitus With Hyperglycemia ([facility])     Latest Reference Range & Units 9/19/22 16:39   Glycohemoglobin 0.0 - 5.6 % 8.0 !       A1c 7.1-8.7 over recent years    Patient and his wife think his DM2 was diagnosed around 6811-1446. He was on metformin and glipizide for several years with stable blood sugars and ARB was added eventually for \"kidney disease\" per patient. Initially, his providers were only checking A1c once a year. He denies history of retinopathy or neuropathy.     ARB discontinued due to hypotension and concerns for orthostasis.    No nephropathy or kidney disease on lab testing. Eye exam completed by Dr. Jean Baptiste was normal.     ([facility]) Hyperlipidemia associated with type 2 diabetes mellitus       Ref. Range 1/31/2020 09:14   Cholesterol,Tot Latest Ref Range: 100 - 199 mg/dL 114   Triglycerides Latest Ref Range: 0 - 149 mg/dL 85   HDL Latest Ref Range: >=40 mg/dL 40   LDL Latest Ref Range: <100 mg/dL 57     He was initiated on simvastatin 20 mg by his previous provider. No known myalgias or side effects. Lipid panel as above is stable.     ([facility]) Mild single current episode of major depressive disorder    He has a history of depression for which he utilizes Prozac 20 mg daily. Mood is doing well on the current regimen. No SI/HI. No side effects from the medication.                 Current supplements as per medication list.       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 He has never used smokeless tobacco. He reports that he does not drink alcohol and does not use drugs.  Counseling given: Not Answered  Tobacco comments: continued abstinence      DPA/Advanced Directive:  Patient does not have an Advanced Directive.  A packet and workshop information was given on Advanced Directives.    ROS:    Gait: Uses no assistive device  Ostomy: No  Other tubes: No  Amputations: No  Chronic oxygen use: No  Last eye exam: 2021  Wears hearing aids: No   : Reports urinary leakage during the last 6 months that has somewhat interfered with their daily activities or sleep.    Screening:    Depression Screening  Little interest or pleasure in doing things?  0 - not at all  Feeling down, depressed , or hopeless? 0 - not at all  Patient Health Questionnaire Score: 0     If depressive symptoms identified deferred to follow up visit unless specifically addressed in assessment and plan.    Interpretation of PHQ-9 Total Score   Score Severity   1-4 No Depression   5-9 Mild Depression   10-14 Moderate Depression   15-19 Moderately Severe Depression   20-27 Severe Depression    Screening for Cognitive Impairment  Three Minute Recall (daughter, dharmesh, tyrone) 2/3 heaven  Paulo clock face with all 12 numbers and set the hands to show 10 past 11.  Yes    Cognitive concerns identified deferred for follow up unless specifically addressed in assessment and plan.    Fall Risk Assessment  Has the patient had two or more falls in the last year or any fall with injury in the last year?  No    Safety Assessment  Throw rugs on floor.  Yes  Handrails on all stairs.  Yes  Good lighting in all hallways.  Yes  Difficulty hearing.  Yes  Patient counseled about all safety risks that were identified.    Functional Assessment ADLs  Are there any barriers preventing you from cooking for yourself or meeting nutritional needs?  No.    Are there any barriers preventing you  from driving safely or obtaining transportation?  No.    Are there any barriers preventing you from using a telephone or calling for help?  No.    Are there any barriers preventing you from shopping?  No.    Are there any barriers preventing you from taking care of your own finances?  No.    Are there any barriers preventing you from managing your medications?  No.    Are there any barriers preventing you from showering, bathing or dressing yourself?  No.    Are you currently engaging in any exercise or physical activity?  Yes.  Walk at [employer]    What is your perception of your health?  Excellent.      Health Maintenance Summary            Ordered - FASTING LIPID PROFILE (Yearly) Ordered on 9/19/2022      06/10/2021  LIPID PANEL    01/31/2020  Lipid Profile    10/20/2018  LIPID PROFILE    02/19/2018  LIPID PANEL              Ordered - URINE ACR / MICROALBUMIN (Yearly) Ordered on 9/19/2022      06/10/2021  MICROALB/CREAT RATIO RAND. UR    01/31/2020  MICROALBUMIN CREAT RATIO URINE    02/19/2018  MICROALB/CREAT RATIO RAND. UR              Ordered - SERUM CREATININE (Yearly) Ordered on 9/19/2022      06/10/2021  COMP METABOLIC PANEL    09/25/2020  Comp Metabolic Panel    01/31/2020  Comp Metabolic Panel    11/12/2018  Outside Procedure: COMP METABOLIC PANEL    11/11/2018  COMPREHENSIVE METABOLIC PANEL W GFR    Only the first 5 history entries have been loaded, but more history exists.              Overdue - RETINAL SCREENING (Yearly) Overdue since 9/9/2022 09/09/2021  REFERRAL FOR RETINAL SCREENING EXAM    02/26/2020  REFERRAL FOR RETINAL SCREENING EXAM    02/04/2019  Done    02/02/2018  REFERRAL FOR RETINAL SCREENING EXAM              A1C SCREENING (Every 3 Months) Tentatively due on 12/19/2022 09/19/2022  POCT A1C    06/10/2021  HEMOGLOBIN A1C    09/25/2020  HEMOGLOBIN A1C    05/06/2020  POCT  A1C    01/29/2020  POCT  A1C    Only the first 5 history entries have been loaded, but more history exists.               DIABETES MONOFILAMENT / LE EXAM (Yearly) Next due on 9/19/2023 09/19/2022  Diabetic Monofilament Lower Extremity Exam    08/06/2019  Diabetic Monofilament LE Exam    03/19/2018  Diabetic Monofilament Lower Extremity Exam              IMM DTaP/Tdap/Td Vaccine (2 - Td or Tdap) Next due on 4/2/2024 04/02/2014  Imm Admin: Tdap Vaccine    12/09/2004  Imm Admin: TD Vaccine    11/28/2000  Imm Admin: TD Vaccine    07/11/1991  Imm Admin: TD Vaccine              COLORECTAL CANCER SCREENING (COLONOSCOPY - Every 10 Years) Next due on 3/7/2026      03/07/2016  REFERRAL TO GI FOR COLONOSCOPY              IMM HEP B VACCINE (Series Information) Completed      05/30/2007  Imm Admin: Hep A/HEP B Combined Vaccine ([employer])    01/06/2005  Imm Admin: Hep A/HEP B Combined Vaccine ([employer]winRix)    12/09/2004  Imm Admin: Hep A/HEP B Combined Vaccine (TwinRix)    11/28/2000  Imm Admin: Hepatitis B Vaccine Adolescent/Pediatric              IMM PNEUMOCOCCAL VACCINE: 65+ Years (Series Information) Completed      10/01/2016  Imm Admin: Pneumococcal polysaccharide vaccine (PPSV-23)    05/20/2015  Imm Admin: Pneumococcal Conjugate Vaccine (Prevnar/PCV-13)              IMM ZOSTER VACCINES (Series Information) Completed      10/18/2021  Imm Admin: Zoster Vaccine Recombinant (RZV) ([employer]X)    05/24/2021  Imm Admin: Zoster Vaccine Recombinant (RZV) (SHINGRIX)              COVID-19 Vaccine (Series Information) Completed      07/01/2022  Imm Admin: PFIZER SIMS CAP SARS-COV-2 VACCINATION (12+)    09/27/2021  Imm Admin: PFIZER PURPLE CAP SARS-COV-2 VACCINATION (12+)    02/19/2021  Imm Admin: PFIZER PURPLE CAP SARS-COV-2 VACCINATION (12+)    01/29/2021  Imm Admin: PFIZER PURPLE CAP SARS-COV-2 VACCINATION (12+)              Annual Wellness Visit  Completed      09/19/2022  Level of Service: ANNUAL WELLNESS VISIT-INCLUDES PPPS SUBSEQUE*              IMM INFLUENZA (Series Information) Completed      09/19/2022  Imm Admin: Influenza  "Vaccine Adult HD    2021  Imm Admin: Influenza Vaccine Adult HD    10/08/2020  Imm Admin: Influenza Vaccine Adult HD    2019  Imm Admin: Influenza Vaccine Adult HD    10/20/2019  Imm Admin: Influenza Vaccine Adult HD    Only the first 5 history entries have been loaded, but more history exists.              IMM MENINGOCOCCAL ACWY VACCINE (Series Information) Aged Out      No completion history exists for this topic.                    Patient Care Team:  Melissa Miller D.O. as PCP - General (Internal Medicine)        Social History     Tobacco Use    Smoking status: Former     Packs/day: 0.00     Types: Cigarettes     Quit date: 1971     Years since quittin.7    Smokeless tobacco: Never    Tobacco comments:     continued abstinence   Vaping Use    Vaping Use: Never used   Substance Use Topics    Alcohol use: No    Drug use: No     Family History   Problem Relation Age of Onset    Heart Disease Mother     Heart Disease Father     Diabetes Brother     Kidney Disease Brother     Diabetes Sister      He  has a past medical history of B12 deficiency (2020), Diabetes (HCC), Diarrhea (2020), Hyperlipidemia, Hypertension, and Vitamin D deficiency (2020).    He has no past medical history of Encounter for long-term (current) use of other medications.   Past Surgical History:   Procedure Laterality Date    OTHER ABDOMINAL SURGERY      Bowel surgery d/t intusussception (doesn't know LI vs. SI, no GI complaints now)       Exam:   /62 (BP Location: Left arm, Patient Position: Sitting, BP Cuff Size: Adult)   Pulse 66   Temp 36.6 °C (97.9 °F) (Temporal)   Resp 12   Ht 1.778 m (5' 10\")   Wt 78.7 kg (173 lb 9.6 oz)   SpO2 97%  Body mass index is 24.91 kg/m².    Hearing fair.    Dentition good  Alert, oriented in no acute distress.  Eye contact is good, speech goal directed, affect calm  Physical Exam  HENT:      Ears:      Comments: Mild wax in bilateral ear canals  Eyes:      " General: No scleral icterus.     Conjunctiva/sclera: Conjunctivae normal.   Cardiovascular:      Rate and Rhythm: Normal rate and regular rhythm.      Pulses: Normal pulses.   Pulmonary:      Effort: Pulmonary effort is normal. No respiratory distress.      Breath sounds: No wheezing or rhonchi.   Abdominal:      General: There is no distension.      Palpations: Abdomen is soft.   Musculoskeletal:      Right lower leg: No edema.      Left lower leg: No edema.      Comments: Diabetic Foot Exam: No ulcers/laceration/blisters present on bilateral feet, normal gross anatomy of bilateral feet without abnormal curvature or arch, no toe deformities, no toenail thickness, no ingrown toenails, no increase in skin temperature bilaterally, no skin erythema to bilateral feet, bilateral dorsalis pedis and posterior tibial pulses 2+ and equal, Refill less than 2 seconds bilaterally, Glenn 10 g monofilament testing normal in bilateral great toes, bilateral balls of feet at medial/lateral/mid ball of foot      Lymphadenopathy:      Cervical: No cervical adenopathy.   Skin:     General: Skin is warm and dry.      Findings: No rash.   Neurological:      Gait: Gait normal.   Psychiatric:         Mood and Affect: Mood normal.         Behavior: Behavior normal.        Assessment and Plan. The following treatment and monitoring plan is recommended:        Fara is a 76 y.o. male with the following:  Problem List Items Addressed This Visit       Uncontrolled type 2 diabetes mellitus with hyperglycemia (HCC)     Chronic and decompensated problem, he is not following a diabetic diet and ran out of his glipizide a few weeks ago. Will send in new prescriptions for metformin and glipizide. He will have updated eye exam in November 2022, no history of retinopathy. Foot exam without concern today. Order urine microalbumin. Recheck A1c in 3 months and if still above goal will add additional medicine. He is in agreeance with this plan.          Relevant Orders    POCT A1C (Completed)    Diabetic Monofilament Lower Extremity Exam (Completed)    (HCC) Hyperlipidemia associated with type 2 diabetes mellitus     Chronic and ongoing problem, will recheck lipid panel to ensure stability on this regimen. Could consider transitioning to a higher potency statin, okay to continue simvastatin in the meantime.         Relevant Orders    CBC WITH DIFFERENTIAL    Comp Metabolic Panel    Lipid Profile    VITAMIN D,25 HYDROXY (DEFICIENCY)    VITAMIN B12    TSH WITH REFLEX TO FT4    (HCC) Mild single current episode of major depressive disorder     Chronic and ongoing problem, continue fluoxetine 20 mg daily which is working well to control current symptoms.          Other Visit Diagnoses       Encounter for subsequent annual wellness visit (AWV) in Medicare patient    -  Primary    Need for vaccination        Relevant Orders    Influenza Vaccine, High Dose (65+ Only) (Completed)    Encounter for screening for malignant neoplasm of prostate        Relevant Orders    PROSTATE SPECIFIC AG SCREENING             Services suggested: No services needed at this time  Health Care Screening: Age-appropriate preventive services recommended by USPTF and ACIP covered by Medicare were discussed today. Services ordered if indicated and agreed upon by the patient.  Referrals offered: Community-based lifestyle interventions to reduce health risks and promote self-management and wellness, fall prevention, nutrition, physical activity, tobacco-use cessation, weight loss, and mental health services as per orders if indicated.    Discussion today about general wellness and lifestyle habits:    Prevent falls and reduce trip hazards; Cautioned about securing or removing rugs.  Have a working fire alarm and carbon monoxide detector;   Engage in regular physical activity and social activities     Follow-up: Return in about 3 months (around 12/19/2022).    I spent a total of 34 minutes with  record review, exam, communication with the patient, communication with other providers, and documentation of this encounter.       PLEASE NOTE: This dictation was created using voice recognition software. I have made every reasonable attempt to correct obvious errors, but I expect that there are errors of grammar and possibly content that I did not discover before finalizing the note.      Melissa Miller, DO  Geriatric and Internal Medicine  [facility]

## 2022-10-26 DIAGNOSIS — E78.5 HYPERLIPIDEMIA ASSOCIATED WITH TYPE 2 DIABETES MELLITUS (HCC): ICD-10-CM

## 2022-10-26 DIAGNOSIS — E11.69 HYPERLIPIDEMIA ASSOCIATED WITH TYPE 2 DIABETES MELLITUS (HCC): ICD-10-CM

## 2022-10-26 RX ORDER — SIMVASTATIN 20 MG
20 TABLET ORAL EVERY EVENING
Qty: 100 TABLET | Refills: 3 | Status: SHIPPED | OUTPATIENT
Start: 2022-10-26 | End: 2024-01-02 | Stop reason: SDUPTHER

## 2022-11-08 ENCOUNTER — DOCUMENTATION (OUTPATIENT)
Dept: HEALTH INFORMATION MANAGEMENT | Facility: OTHER | Age: 77
End: 2022-11-08
Payer: MEDICARE

## 2022-11-08 ENCOUNTER — PATIENT MESSAGE (OUTPATIENT)
Dept: HEALTH INFORMATION MANAGEMENT | Facility: OTHER | Age: 77
End: 2022-11-08

## 2022-11-10 ENCOUNTER — PATIENT MESSAGE (OUTPATIENT)
Dept: HEALTH INFORMATION MANAGEMENT | Facility: OTHER | Age: 77
End: 2022-11-10

## 2022-12-21 ENCOUNTER — HOSPITAL ENCOUNTER (OUTPATIENT)
Dept: LAB | Facility: MEDICAL CENTER | Age: 77
End: 2022-12-21
Attending: INTERNAL MEDICINE
Payer: MEDICARE

## 2022-12-21 DIAGNOSIS — E11.69 HYPERLIPIDEMIA ASSOCIATED WITH TYPE 2 DIABETES MELLITUS (HCC): ICD-10-CM

## 2022-12-21 DIAGNOSIS — E11.65 UNCONTROLLED TYPE 2 DIABETES MELLITUS WITH HYPERGLYCEMIA (HCC): ICD-10-CM

## 2022-12-21 DIAGNOSIS — E78.5 HYPERLIPIDEMIA ASSOCIATED WITH TYPE 2 DIABETES MELLITUS (HCC): ICD-10-CM

## 2022-12-21 DIAGNOSIS — Z12.5 ENCOUNTER FOR SCREENING FOR MALIGNANT NEOPLASM OF PROSTATE: ICD-10-CM

## 2022-12-21 LAB
BASOPHILS # BLD AUTO: 0.8 % (ref 0–1.8)
BASOPHILS # BLD: 0.06 K/UL (ref 0–0.12)
CREAT UR-MCNC: 120.74 MG/DL
EOSINOPHIL # BLD AUTO: 0.12 K/UL (ref 0–0.51)
EOSINOPHIL NFR BLD: 1.7 % (ref 0–6.9)
ERYTHROCYTE [DISTWIDTH] IN BLOOD BY AUTOMATED COUNT: 43.8 FL (ref 35.9–50)
HCT VFR BLD AUTO: 46.6 % (ref 42–52)
HGB BLD-MCNC: 15.3 G/DL (ref 14–18)
IMM GRANULOCYTES # BLD AUTO: 0.02 K/UL (ref 0–0.11)
IMM GRANULOCYTES NFR BLD AUTO: 0.3 % (ref 0–0.9)
LYMPHOCYTES # BLD AUTO: 2.36 K/UL (ref 1–4.8)
LYMPHOCYTES NFR BLD: 33 % (ref 22–41)
MCH RBC QN AUTO: 30.2 PG (ref 27–33)
MCHC RBC AUTO-ENTMCNC: 32.8 G/DL (ref 33.7–35.3)
MCV RBC AUTO: 92.1 FL (ref 81.4–97.8)
MICROALBUMIN UR-MCNC: <1.2 MG/DL
MICROALBUMIN/CREAT UR: NORMAL MG/G (ref 0–30)
MONOCYTES # BLD AUTO: 0.51 K/UL (ref 0–0.85)
MONOCYTES NFR BLD AUTO: 7.1 % (ref 0–13.4)
NEUTROPHILS # BLD AUTO: 4.08 K/UL (ref 1.82–7.42)
NEUTROPHILS NFR BLD: 57.1 % (ref 44–72)
NRBC # BLD AUTO: 0 K/UL
NRBC BLD-RTO: 0 /100 WBC
PLATELET # BLD AUTO: 313 K/UL (ref 164–446)
PMV BLD AUTO: 9.2 FL (ref 9–12.9)
RBC # BLD AUTO: 5.06 M/UL (ref 4.7–6.1)
WBC # BLD AUTO: 7.2 K/UL (ref 4.8–10.8)

## 2022-12-21 PROCEDURE — 84153 ASSAY OF PSA TOTAL: CPT

## 2022-12-21 PROCEDURE — 82570 ASSAY OF URINE CREATININE: CPT

## 2022-12-21 PROCEDURE — 82607 VITAMIN B-12: CPT

## 2022-12-21 PROCEDURE — 80053 COMPREHEN METABOLIC PANEL: CPT

## 2022-12-21 PROCEDURE — 36415 COLL VENOUS BLD VENIPUNCTURE: CPT

## 2022-12-21 PROCEDURE — 85025 COMPLETE CBC W/AUTO DIFF WBC: CPT

## 2022-12-21 PROCEDURE — 82306 VITAMIN D 25 HYDROXY: CPT

## 2022-12-21 PROCEDURE — 84443 ASSAY THYROID STIM HORMONE: CPT

## 2022-12-21 PROCEDURE — 80061 LIPID PANEL: CPT

## 2022-12-21 PROCEDURE — 82043 UR ALBUMIN QUANTITATIVE: CPT

## 2022-12-22 LAB
25(OH)D3 SERPL-MCNC: 76 NG/ML (ref 30–100)
ALBUMIN SERPL BCP-MCNC: 4.3 G/DL (ref 3.2–4.9)
ALBUMIN/GLOB SERPL: 1.4 G/DL
ALP SERPL-CCNC: 63 U/L (ref 30–99)
ALT SERPL-CCNC: 30 U/L (ref 2–50)
ANION GAP SERPL CALC-SCNC: 11 MMOL/L (ref 7–16)
AST SERPL-CCNC: 20 U/L (ref 12–45)
BILIRUB SERPL-MCNC: 0.8 MG/DL (ref 0.1–1.5)
BUN SERPL-MCNC: 14 MG/DL (ref 8–22)
CALCIUM ALBUM COR SERPL-MCNC: 9.3 MG/DL (ref 8.5–10.5)
CALCIUM SERPL-MCNC: 9.5 MG/DL (ref 8.5–10.5)
CHLORIDE SERPL-SCNC: 102 MMOL/L (ref 96–112)
CHOLEST SERPL-MCNC: 140 MG/DL (ref 100–199)
CO2 SERPL-SCNC: 25 MMOL/L (ref 20–33)
CREAT SERPL-MCNC: 0.85 MG/DL (ref 0.5–1.4)
FASTING STATUS PATIENT QL REPORTED: NORMAL
GFR SERPLBLD CREATININE-BSD FMLA CKD-EPI: 89 ML/MIN/1.73 M 2
GLOBULIN SER CALC-MCNC: 3.1 G/DL (ref 1.9–3.5)
GLUCOSE SERPL-MCNC: 152 MG/DL (ref 65–99)
HDLC SERPL-MCNC: 35 MG/DL
LDLC SERPL CALC-MCNC: 78 MG/DL
POTASSIUM SERPL-SCNC: 4.7 MMOL/L (ref 3.6–5.5)
PROT SERPL-MCNC: 7.4 G/DL (ref 6–8.2)
PSA SERPL-MCNC: 0.93 NG/ML (ref 0–4)
SODIUM SERPL-SCNC: 138 MMOL/L (ref 135–145)
TRIGL SERPL-MCNC: 134 MG/DL (ref 0–149)
TSH SERPL DL<=0.005 MIU/L-ACNC: 2.49 UIU/ML (ref 0.38–5.33)
VIT B12 SERPL-MCNC: 274 PG/ML (ref 211–911)

## 2022-12-27 ENCOUNTER — OFFICE VISIT (OUTPATIENT)
Dept: MEDICAL GROUP | Facility: PHYSICIAN GROUP | Age: 77
End: 2022-12-27
Payer: MEDICARE

## 2022-12-27 VITALS
DIASTOLIC BLOOD PRESSURE: 76 MMHG | RESPIRATION RATE: 14 BRPM | WEIGHT: 175 LBS | SYSTOLIC BLOOD PRESSURE: 126 MMHG | HEIGHT: 70 IN | OXYGEN SATURATION: 97 % | TEMPERATURE: 97.9 F | HEART RATE: 60 BPM | BODY MASS INDEX: 25.05 KG/M2

## 2022-12-27 DIAGNOSIS — E53.8 B12 DEFICIENCY: ICD-10-CM

## 2022-12-27 DIAGNOSIS — E78.5 HYPERLIPIDEMIA ASSOCIATED WITH TYPE 2 DIABETES MELLITUS (HCC): ICD-10-CM

## 2022-12-27 DIAGNOSIS — F32.5 MAJOR DEPRESSIVE DISORDER WITH SINGLE EPISODE, IN FULL REMISSION (HCC): ICD-10-CM

## 2022-12-27 DIAGNOSIS — E11.69 HYPERLIPIDEMIA ASSOCIATED WITH TYPE 2 DIABETES MELLITUS (HCC): ICD-10-CM

## 2022-12-27 DIAGNOSIS — E11.65 UNCONTROLLED TYPE 2 DIABETES MELLITUS WITH HYPERGLYCEMIA (HCC): ICD-10-CM

## 2022-12-27 LAB
HBA1C MFR BLD: 8.1 % (ref 0–5.6)
INT CON NEG: ABNORMAL
INT CON POS: ABNORMAL

## 2022-12-27 PROCEDURE — 83036 HEMOGLOBIN GLYCOSYLATED A1C: CPT | Performed by: INTERNAL MEDICINE

## 2022-12-27 PROCEDURE — 99214 OFFICE O/P EST MOD 30 MIN: CPT | Performed by: INTERNAL MEDICINE

## 2022-12-27 RX ORDER — PYRIDOXINE HCL (VITAMIN B6) 50 MG
1 TABLET ORAL DAILY
COMMUNITY

## 2022-12-27 ASSESSMENT — FIBROSIS 4 INDEX: FIB4 SCORE: 0.91

## 2022-12-27 NOTE — ASSESSMENT & PLAN NOTE
Chronic and stable problem, mood is well controlled on current regimen, continue fluoxetine 20 mg daily.

## 2022-12-27 NOTE — PROGRESS NOTES
"Subjective:   Chief Complaint/History of Present Illness:  Fara Chavira is a 77 y.o. male established patient who presents today to discuss medical problems as listed below. Fara is accompanied by his wife, Mikayla.    Problem   B12 Deficiency     Latest Reference Range & Units 12/21/22 09:32   Vitamin B12 -True Cobalamin 211 - 911 pg/mL 274       He has a history of B12 deficiency. No neuropathy or gait instability at this time. Previously taking 3,000 mcg daily with elevated levels, recommend he take 100 mcg daily instead.     Uncontrolled Type 2 Diabetes Mellitus With Hyperglycemia (Hcc)     Latest Reference Range & Units 12/27/22 11:41   Glycohemoglobin 0.0 - 5.6 % 8.1 !     A1c 7.1-8.7 over recent years    Patient and his wife think his DM2 was diagnosed around 5163-1709. He was on metformin and glipizide for several years with stable blood sugars and ARB was added eventually for \"kidney disease\" per patient. Initially, his providers were only checking A1c once a year. He denies history of retinopathy or neuropathy. Agreeable to trying Rybelsus 3 mg daily due to A1c above goal.    ARB discontinued due to hypotension and concerns for orthostasis.    No nephropathy or kidney disease on lab testing. Eye exam completed by Dr. Jean Baptiste was normal in Nov 2022.    Current regimen: metformin 1000 mg BID, glipizide 5 mg daily, Rybelsus 3 mg daily.     (HCC) Hyperlipidemia associated with type 2 diabetes mellitus     Latest Reference Range & Units 12/21/22 09:32   Cholesterol,Tot 100 - 199 mg/dL 140   Triglycerides 0 - 149 mg/dL 134   HDL >=40 mg/dL 35 !   LDL <100 mg/dL 78     He was initiated on statin by his previous provider. No known myalgias or side effects. Lipid panel as above is stable.    Current regimen: simvastatin 20 mg daily     Major Depressive Disorder With Single Episode, in Full Remission (Hcc)    He has a history of depression for which he utilizes SSRI. Mood is doing well on the current regimen. No " "SI/HI. No side effects from the medication.     Current regimen: fluoxetine 20 mg daily            Current Medications:  Current Outpatient Medications Ordered in Epic   Medication Sig Dispense Refill    Semaglutide 3 MG Tab Take 1 Tablet by mouth every day.      Cyanocobalamin (B-12) 100 MCG Tab Take 1 Tablet by mouth every day.      simvastatin (ZOCOR) 20 MG Tab Take 1 Tablet by mouth every evening. 100 Tablet 3    metFORMIN ER (GLUCOPHAGE XR) 500 MG TABLET SR 24 HR Take 2 Tablets by mouth 2 times a day. 400 Tablet 3    glipiZIDE SR (GLUCOTROL) 5 MG TABLET SR 24 HR Take 1 Tablet by mouth every day. 100 Tablet 3    FLUoxetine (PROZAC) 20 MG Cap Take 1 Capsule by mouth every day. 100 Capsule 3    alfuzosin (UROXATRAL) 10 MG SR tablet Take 1 Tablet by mouth every day. 100 Tablet 3    Probiotic Product (PROBIOTIC-10 ULTIMATE) Cap Take  by mouth.      ASPIRIN 81 PO Take 1 Each by mouth every day at 6 PM.      cholecalciferol (VITAMIN D3) 400 UNIT Tab Take 400 Units by mouth every day.       No current [facility]-ordered facility-administered medications on file.          Objective:   Physical Exam:    Vitals: /76   Pulse 60   Temp 36.6 °C (97.9 °F) (Temporal)   Resp 14   Ht 1.778 m (5' 10\")   Wt 79.4 kg (175 lb)   SpO2 97%    BMI: Body mass index is 25.11 kg/m².  Physical Exam  Constitutional:       General: He is not in acute distress.     Appearance: Normal appearance. He is not ill-appearing.   HENT:      Left Ear: There is no impacted cerumen.   Eyes:      General: No scleral icterus.     Conjunctiva/sclera: Conjunctivae normal.   Cardiovascular:      Rate and Rhythm: Normal rate and regular rhythm.      Pulses: Normal pulses.   Pulmonary:      Effort: Pulmonary effort is normal. No respiratory distress.      Breath sounds: No wheezing, rhonchi or rales.   Musculoskeletal:      Right lower leg: No edema.      Left lower leg: No edema.   Skin:     General: Skin is warm and dry.      Findings: No bruising or " rash.   Neurological:      Gait: Gait normal.   Psychiatric:         Mood and Affect: Mood normal.         Behavior: Behavior normal.         Thought Content: Thought content normal.         Judgment: Judgment normal.             Assessment and Plan:   Fara is a 77 y.o. male with the following:  Problem List Items Addressed This Visit       Uncontrolled type 2 diabetes mellitus with hyperglycemia (HCC)     Chronic and decompensated problem, A1c remains above goal, he is agreeable to trying Rybelsus 3 mg daily in addition to metformin 1000 mg BID and glipizide 5 mg daily. Recheck again in 3 months for A1c. Normal foot exam. Request retinal exam records. No microalbumin. Continue simvastatin and aspirin. No ACEI/ARB indicated and BP is at goal.         Relevant Medications    Semaglutide 3 MG Tab    Other Relevant Orders    POCT  A1C (Completed)    (HCC) Hyperlipidemia associated with type 2 diabetes mellitus     Chronic and stable problem, encouraged him to improve diet to help with LDL, continue simvastatin 20 mg daily for cholesterol.         Relevant Medications    Semaglutide 3 MG Tab    Major depressive disorder with single episode, in full remission (HCC)     Chronic and stable problem, mood is well controlled on current regimen, continue fluoxetine 20 mg daily.         B12 deficiency     Chronic and decompensated level, fortunately no neurologic symptoms today, resume B12 at reduced dose of 500 mcg daily. Low secondary to chronic metformin use.             RTC: Return in about 3 months (around 3/27/2023).    I spent a total of 32 minutes with record review, exam, communication with the patient, communication with other providers, and documentation of this encounter.    PLEASE NOTE: This dictation was created using voice recognition software. I have made every reasonable attempt to correct obvious errors, but I expect that there are errors of grammar and possibly content that I did not discover before  finalizing the note.      Melissa Miller, DO  Geriatric and Internal Medicine  [facility]

## 2022-12-27 NOTE — ASSESSMENT & PLAN NOTE
Chronic and stable problem, encouraged him to improve diet to help with LDL, continue simvastatin 20 mg daily for cholesterol.

## 2022-12-27 NOTE — LETTER
[facility]  Melissa Miller D.O.  [address]  [place] [address]  Fax: [phone]   Authorization for Release/Disclosure of   Protected Health Information   Name: FARA CHAVIRA : 1945 SSN: [id number]   Address: [facility] [place] [address] Phone:    [phone] (home) [phone] (work)   I authorize the entity listed below to release/disclose the PHI below to:   [facility]/Melissa Miller D.O. and Melissa Miller D.O.   Provider or Entity Name:  Matt Markel   Address   City, State, [facility]   Phone:      Fax:     Reason for request: continuity of care   Information to be released:    [  ] LAST COLONOSCOPY,  including any PATH REPORT and follow-up  [  ] LAST FIT/COLOGUARD RESULT [  ] LAST DEXA  [  ] LAST MAMMOGRAM  [  ] LAST PAP  [  ] LAST LABS [ x ] RETINA EXAM REPORT  [  ] IMMUNIZATION RECORDS  [ x ] Release all info      [ x ] Check here and initial the line next to each item to release ALL health information INCLUDING  _____ Care and treatment for drug and / or alcohol abuse  _____ HIV testing, infection status, or AIDS  _____ Genetic Testing    DATES OF SERVICE OR TIME PERIOD TO BE DISCLOSED: _____________  I understand and acknowledge that:  * This Authorization may be revoked at any time by you in writing, except if your health information has already been used or disclosed.  * Your health information that will be used or disclosed as a result of you signing this authorization could be re-disclosed by the recipient. If this occurs, your re-disclosed health information may no longer be protected by State or Federal laws.  * You may refuse to sign this Authorization. Your refusal will not affect your ability to obtain treatment.  * This Authorization becomes effective upon signing and will  on (date) __________.      If no date is indicated, this Authorization will  one (1) year from the signature date.    Name: Fara Chavira    Signature:    Date:     12/27/2022       PLEASE FAX REQUESTED RECORDS BACK TO: [phone]

## 2022-12-27 NOTE — ASSESSMENT & PLAN NOTE
Chronic and decompensated problem, A1c remains above goal, he is agreeable to trying Rybelsus 3 mg daily in addition to metformin 1000 mg BID and glipizide 5 mg daily. Recheck again in 3 months for A1c. Normal foot exam. Request retinal exam records. No microalbumin. Continue simvastatin and aspirin. No ACEI/ARB indicated and BP is at goal.

## 2022-12-27 NOTE — ASSESSMENT & PLAN NOTE
Chronic and decompensated level, fortunately no neurologic symptoms today, resume B12 at reduced dose of 500 mcg daily. Low secondary to chronic metformin use.

## 2022-12-28 DIAGNOSIS — F39 MOOD DISORDER (HCC): ICD-10-CM

## 2022-12-28 RX ORDER — FLUOXETINE HYDROCHLORIDE 20 MG/1
20 CAPSULE ORAL
Qty: 100 CAPSULE | Refills: 3 | Status: SHIPPED | OUTPATIENT
Start: 2022-12-28 | End: 2024-01-23 | Stop reason: SDUPTHER

## 2022-12-28 NOTE — TELEPHONE ENCOUNTER
Received request via: Patient    Was the patient seen in the last year in this department? Yes    Does the patient have an active prescription (recently filled or refills available) for medication(s) requested? No    Does the patient have care home Plus and need 100 day supply (blood pressure, diabetes and cholesterol meds only)? Yes, quantity updated to 100 days

## 2023-01-19 DIAGNOSIS — E11.9 CONTROLLED TYPE 2 DIABETES MELLITUS WITHOUT COMPLICATION, WITHOUT LONG-TERM CURRENT USE OF INSULIN (HCC): ICD-10-CM

## 2023-01-19 RX ORDER — GLIPIZIDE 5 MG/1
5 TABLET, FILM COATED, EXTENDED RELEASE ORAL
Qty: 100 TABLET | Refills: 3 | Status: SHIPPED
Start: 2023-01-19 | End: 2023-04-12

## 2023-01-19 NOTE — TELEPHONE ENCOUNTER
Re Received request via: Pharmacy    Was the patient seen in the last year in this department? Yes    Does the patient have an active prescription (recently filled or refills available) for medication(s) requested? No    Does the patient have snf Plus and need 100 day supply (blood pressure, diabetes and cholesterol meds only)? Yes, quantity updated to 100 days

## 2023-02-10 DIAGNOSIS — E11.9 CONTROLLED TYPE 2 DIABETES MELLITUS WITHOUT COMPLICATION, WITHOUT LONG-TERM CURRENT USE OF INSULIN (HCC): ICD-10-CM

## 2023-02-11 RX ORDER — METFORMIN HYDROCHLORIDE 500 MG/1
1000 TABLET, EXTENDED RELEASE ORAL 2 TIMES DAILY
Qty: 400 TABLET | Refills: 3 | Status: SHIPPED | OUTPATIENT
Start: 2023-02-11 | End: 2024-02-21 | Stop reason: SDUPTHER

## 2023-03-07 DIAGNOSIS — E11.65 UNCONTROLLED TYPE 2 DIABETES MELLITUS WITH HYPERGLYCEMIA (HCC): ICD-10-CM

## 2023-04-12 ENCOUNTER — OFFICE VISIT (OUTPATIENT)
Dept: MEDICAL GROUP | Facility: PHYSICIAN GROUP | Age: 78
End: 2023-04-12
Payer: MEDICARE

## 2023-04-12 VITALS
WEIGHT: 169 LBS | DIASTOLIC BLOOD PRESSURE: 56 MMHG | SYSTOLIC BLOOD PRESSURE: 118 MMHG | OXYGEN SATURATION: 96 % | TEMPERATURE: 96.8 F | HEIGHT: 70 IN | HEART RATE: 67 BPM | RESPIRATION RATE: 16 BRPM | BODY MASS INDEX: 24.2 KG/M2

## 2023-04-12 DIAGNOSIS — E78.5 HYPERLIPIDEMIA ASSOCIATED WITH TYPE 2 DIABETES MELLITUS (HCC): ICD-10-CM

## 2023-04-12 DIAGNOSIS — F32.5 MAJOR DEPRESSIVE DISORDER WITH SINGLE EPISODE, IN FULL REMISSION (HCC): ICD-10-CM

## 2023-04-12 DIAGNOSIS — E11.8 CONTROLLED TYPE 2 DIABETES MELLITUS WITH COMPLICATION, WITHOUT LONG-TERM CURRENT USE OF INSULIN (HCC): ICD-10-CM

## 2023-04-12 DIAGNOSIS — E53.8 B12 DEFICIENCY: ICD-10-CM

## 2023-04-12 DIAGNOSIS — E11.69 HYPERLIPIDEMIA ASSOCIATED WITH TYPE 2 DIABETES MELLITUS (HCC): ICD-10-CM

## 2023-04-12 LAB
HBA1C MFR BLD: 7.1 % (ref ?–5.8)
POCT INT CON NEG: NEGATIVE
POCT INT CON POS: POSITIVE

## 2023-04-12 PROCEDURE — 83036 HEMOGLOBIN GLYCOSYLATED A1C: CPT | Performed by: INTERNAL MEDICINE

## 2023-04-12 PROCEDURE — 99214 OFFICE O/P EST MOD 30 MIN: CPT | Performed by: INTERNAL MEDICINE

## 2023-04-12 ASSESSMENT — PATIENT HEALTH QUESTIONNAIRE - PHQ9
9. THOUGHTS THAT YOU WOULD BE BETTER OFF DEAD, OR OF HURTING YOURSELF: NOT AT ALL
SUM OF ALL RESPONSES TO PHQ QUESTIONS 1-9: 1
8. MOVING OR SPEAKING SO SLOWLY THAT OTHER PEOPLE COULD HAVE NOTICED. OR THE OPPOSITE, BEING SO FIGETY OR RESTLESS THAT YOU HAVE BEEN MOVING AROUND A LOT MORE THAN USUAL: NOT AT ALL
5. POOR APPETITE OR OVEREATING: SEVERAL DAYS
7. TROUBLE CONCENTRATING ON THINGS, SUCH AS READING THE NEWSPAPER OR WATCHING TELEVISION: NOT AT ALL
6. FEELING BAD ABOUT YOURSELF - OR THAT YOU ARE A FAILURE OR HAVE LET YOURSELF OR YOUR FAMILY DOWN: NOT AL ALL
3. TROUBLE FALLING OR STAYING ASLEEP OR SLEEPING TOO MUCH: NOT AT ALL
SUM OF ALL RESPONSES TO PHQ9 QUESTIONS 1 AND 2: 0
4. FEELING TIRED OR HAVING LITTLE ENERGY: NOT AT ALL
2. FEELING DOWN, DEPRESSED, IRRITABLE, OR HOPELESS: NOT AT ALL
1. LITTLE INTEREST OR PLEASURE IN DOING THINGS: NOT AT ALL

## 2023-04-12 ASSESSMENT — FIBROSIS 4 INDEX: FIB4 SCORE: 0.91

## 2023-04-12 NOTE — ASSESSMENT & PLAN NOTE
Chronic stable problem, recheck with next lab work in August 2023, continue simvastatin 20 mg daily.

## 2023-04-12 NOTE — ASSESSMENT & PLAN NOTE
Chronic improved problem.  A1c has improved down to 7.1 with addition of Rybelsus.  Recommend we optimize Rybelsus by increasing to 14 mg daily and recommend we stop glipizide 5 mg daily due to risk of hypoglycemia in older adults.  Then pending repeat A1c we may be able to dial back some on his metformin as well which is currently at 1000 mg twice daily.  Will contact Dr. Jean Baptiste's office to get his retinal examination from 11/2022.  Other diabetes metrics are up-to-date.

## 2023-04-12 NOTE — PROGRESS NOTES
"Subjective:   Chief Complaint/History of Present Illness:  Fara Chavira is a 77 y.o. male established patient who presents today to discuss medical problems as listed below. Fara is unaccompanied for today's visit.    Problem   B12 Deficiency     Latest Reference Range & Units 12/21/22 09:32   Vitamin B12 -True Cobalamin 211 - 911 pg/mL 274       He has a history of B12 deficiency. No neuropathy or gait instability at this time. Previously taking 3,000 mcg daily with elevated levels, recommend he take 100 mcg daily instead.     Controlled Type 2 Diabetes Mellitus Without Complication, Without Long-Term Current Use of Insulin ([facility])     Latest Reference Range & Units 12/27/22 11:41 04/12/23 09:47   Glycohemoglobin 5.8 % 8.1 ! 7.1 !     A1c 7.1-8.7 over recent years    Patient and his wife think his DM2 was diagnosed around 8667-5686. He was on metformin and glipizide for several years with stable blood sugars and ARB was added eventually for \"kidney disease\" per patient. Initially, his providers were only checking A1c once a year. He denies history of retinopathy or neuropathy. Agreeable to trying Rybelsus which was increased in 4/2023, levels doing better. Will stop glipizide in 4/2023.    ARB discontinued due to hypotension and concerns for orthostasis.    No nephropathy or kidney disease on lab testing. Eye exam completed by Dr. Jean Baptiste was normal in Nov 2022.    Current regimen: metformin 1000 mg BID, Rybelsus 14 mg daily.     ([facility]) Hyperlipidemia associated with type 2 diabetes mellitus     Latest Reference Range & Units 12/21/22 09:32   Cholesterol,Tot 100 - 199 mg/dL 140   Triglycerides 0 - 149 mg/dL 134   HDL >=40 mg/dL 35 !   LDL <100 mg/dL 78     He was initiated on statin by his previous provider. No known myalgias or side effects. Lipid panel as above is stable.    Current regimen: simvastatin 20 mg daily     Major Depressive Disorder With Single Episode, in Full Remission ([facility])    He has a history of " "depression for which he utilizes SSRI. Mood is doing well on the current regimen. No SI/HI. No side effects from the medication.     Current regimen: fluoxetine 20 mg daily            Current Medications:  Current Outpatient Medications Ordered in Epic   Medication Sig Dispense Refill    Semaglutide 14 MG Tab Take 1 Tablet by mouth every day. 100 Tablet 3    metFORMIN ER (GLUCOPHAGE XR) 500 MG TABLET SR 24 HR Take 2 Tablets by mouth 2 times a day. 400 Tablet 3    FLUoxetine (PROZAC) 20 MG Cap Take 1 Capsule by mouth every day. 100 Capsule 3    Cyanocobalamin (B-12) 100 MCG Tab Take 1 Tablet by mouth every day.      simvastatin (ZOCOR) 20 MG Tab Take 1 Tablet by mouth every evening. 100 Tablet 3    alfuzosin (UROXATRAL) 10 MG SR tablet Take 1 Tablet by mouth every day. 100 Tablet 3    Probiotic Product (PROBIOTIC-10 ULTIMATE) Cap Take  by mouth.      ASPIRIN 81 PO Take 1 Each by mouth every day at 6 PM.      cholecalciferol (VITAMIN D3) 400 UNIT Tab Take 400 Units by mouth every day.       No current [facility]-ordered facility-administered medications on file.          Objective:   Physical Exam:    Vitals: /56 (BP Location: Right arm, Patient Position: Sitting, BP Cuff Size: Adult)   Pulse 67   Temp 36 °C (96.8 °F) (Temporal)   Resp 16   Ht 1.778 m (5' 10\")   Wt 76.7 kg (169 lb)   SpO2 96%    BMI: Body mass index is 24.25 kg/m².  Physical Exam  Constitutional:       General: He is not in acute distress.     Appearance: Normal appearance. He is not ill-appearing.   HENT:      Right Ear: Tympanic membrane and ear canal normal. There is no impacted cerumen.      Left Ear: Tympanic membrane and ear canal normal. There is no impacted cerumen.   Eyes:      General: No scleral icterus.     Conjunctiva/sclera: Conjunctivae normal.   Cardiovascular:      Rate and Rhythm: Normal rate and regular rhythm.      Pulses: Normal pulses.   Pulmonary:      Effort: Pulmonary effort is normal. No respiratory distress.      " Breath sounds: No wheezing or rhonchi.   Abdominal:      General: There is no distension.      Palpations: Abdomen is soft.   Musculoskeletal:      Right lower leg: No edema.      Left lower leg: No edema.   Skin:     General: Skin is warm and dry.      Findings: No bruising or rash.   Neurological:      Gait: Gait normal.   Psychiatric:         Mood and Affect: Mood normal.         Behavior: Behavior normal.         Thought Content: Thought content normal.         Judgment: Judgment normal.             Assessment and Plan:   Fara is a 77 y.o. male with the following:  Problem List Items Addressed This Visit       ([facility]) Hyperlipidemia associated with type 2 diabetes mellitus     Chronic stable problem, recheck with next lab work in August 2023, continue simvastatin 20 mg daily.         Relevant Medications    Semaglutide 14 MG Tab    B12 deficiency     Previous problem, requires recheck, now on B12 100 mcg daily, will update with lab panel before next visit.         Controlled type 2 diabetes mellitus without complication, without long-term current use of insulin ([facility])     Chronic improved problem.  A1c has improved down to 7.1 with addition of Rybelsus.  Recommend we optimize Rybelsus by increasing to 14 mg daily and recommend we stop glipizide 5 mg daily due to risk of hypoglycemia in older adults.  Then pending repeat A1c we may be able to dial back some on his metformin as well which is currently at 1000 mg twice daily.  Will contact Dr. Jean Baptiste's office to get his retinal examination from 11/2022.  Other diabetes metrics are up-to-date.         Relevant Medications    Semaglutide 14 MG Tab    Other Relevant Orders    POCT  A1C (Completed)    CBC WITH DIFFERENTIAL    Comp Metabolic Panel    Lipid Profile    MICROALBUMIN CREAT RATIO URINE    VITAMIN B12    VITAMIN D,25 HYDROXY (DEFICIENCY)    Major depressive disorder with single episode, in full remission ([facility])     Chronic stable problem, mood doing well on  current regimen, continue fluoxetine 20 mg daily.             RTC: Return in about 4 months (around 8/12/2023).    I spent a total of 32 minutes with record review, exam, communication with the patient, communication with other providers, and documentation of this encounter.    PLEASE NOTE: This dictation was created using voice recognition software. I have made every reasonable attempt to correct obvious errors, but I expect that there are errors of grammar and possibly content that I did not discover before finalizing the note.      Melissa Miller, DO  Geriatric and Internal Medicine  [facility]

## 2023-04-12 NOTE — ASSESSMENT & PLAN NOTE
Previous problem, requires recheck, now on B12 100 mcg daily, will update with lab panel before next visit.

## 2023-05-29 DIAGNOSIS — N40.1 BENIGN PROSTATIC HYPERPLASIA WITH URINARY FREQUENCY: ICD-10-CM

## 2023-05-29 DIAGNOSIS — R35.0 BENIGN PROSTATIC HYPERPLASIA WITH URINARY FREQUENCY: ICD-10-CM

## 2023-05-30 RX ORDER — ALFUZOSIN HYDROCHLORIDE 10 MG/1
10 TABLET, EXTENDED RELEASE ORAL DAILY
Qty: 100 TABLET | Refills: 3 | Status: SHIPPED
Start: 2023-05-30 | End: 2023-12-28

## 2023-05-30 NOTE — TELEPHONE ENCOUNTER
Received request via: Patient    Was the patient seen in the last year in this department? Yes    Does the patient have an active prescription (recently filled or refills available) for medication(s) requested? No    Does the patient have assisted Plus and need 100 day supply (blood pressure, diabetes and cholesterol meds only)? Medication is not for cholesterol, blood pressure or diabetes

## 2023-06-27 ENCOUNTER — HOSPITAL ENCOUNTER (OUTPATIENT)
Dept: LAB | Facility: MEDICAL CENTER | Age: 78
End: 2023-06-27
Attending: INTERNAL MEDICINE
Payer: MEDICARE

## 2023-06-27 DIAGNOSIS — E11.8 CONTROLLED TYPE 2 DIABETES MELLITUS WITH COMPLICATION, WITHOUT LONG-TERM CURRENT USE OF INSULIN (HCC): ICD-10-CM

## 2023-06-27 LAB
25(OH)D3 SERPL-MCNC: 77 NG/ML (ref 30–100)
ALBUMIN SERPL BCP-MCNC: 4.2 G/DL (ref 3.2–4.9)
ALBUMIN/GLOB SERPL: 1.8 G/DL
ALP SERPL-CCNC: 56 U/L (ref 30–99)
ALT SERPL-CCNC: 14 U/L (ref 2–50)
ANION GAP SERPL CALC-SCNC: 13 MMOL/L (ref 7–16)
AST SERPL-CCNC: 12 U/L (ref 12–45)
BASOPHILS # BLD AUTO: 0.6 % (ref 0–1.8)
BASOPHILS # BLD: 0.04 K/UL (ref 0–0.12)
BILIRUB SERPL-MCNC: 1 MG/DL (ref 0.1–1.5)
BUN SERPL-MCNC: 11 MG/DL (ref 8–22)
CALCIUM ALBUM COR SERPL-MCNC: 9.3 MG/DL (ref 8.5–10.5)
CALCIUM SERPL-MCNC: 9.5 MG/DL (ref 8.5–10.5)
CHLORIDE SERPL-SCNC: 101 MMOL/L (ref 96–112)
CHOLEST SERPL-MCNC: 103 MG/DL (ref 100–199)
CO2 SERPL-SCNC: 24 MMOL/L (ref 20–33)
CREAT SERPL-MCNC: 0.84 MG/DL (ref 0.5–1.4)
CREAT UR-MCNC: 120.64 MG/DL
EOSINOPHIL # BLD AUTO: 0.1 K/UL (ref 0–0.51)
EOSINOPHIL NFR BLD: 1.4 % (ref 0–6.9)
ERYTHROCYTE [DISTWIDTH] IN BLOOD BY AUTOMATED COUNT: 44.7 FL (ref 35.9–50)
FASTING STATUS PATIENT QL REPORTED: NORMAL
GFR SERPLBLD CREATININE-BSD FMLA CKD-EPI: 89 ML/MIN/1.73 M 2
GLOBULIN SER CALC-MCNC: 2.4 G/DL (ref 1.9–3.5)
GLUCOSE SERPL-MCNC: 106 MG/DL (ref 65–99)
HCT VFR BLD AUTO: 45.7 % (ref 42–52)
HDLC SERPL-MCNC: 34 MG/DL
HGB BLD-MCNC: 14.5 G/DL (ref 14–18)
IMM GRANULOCYTES # BLD AUTO: 0.01 K/UL (ref 0–0.11)
IMM GRANULOCYTES NFR BLD AUTO: 0.1 % (ref 0–0.9)
LDLC SERPL CALC-MCNC: 51 MG/DL
LYMPHOCYTES # BLD AUTO: 2.38 K/UL (ref 1–4.8)
LYMPHOCYTES NFR BLD: 34.3 % (ref 22–41)
MCH RBC QN AUTO: 29.4 PG (ref 27–33)
MCHC RBC AUTO-ENTMCNC: 31.7 G/DL (ref 32.3–36.5)
MCV RBC AUTO: 92.7 FL (ref 81.4–97.8)
MICROALBUMIN UR-MCNC: <1.2 MG/DL
MICROALBUMIN/CREAT UR: NORMAL MG/G (ref 0–30)
MONOCYTES # BLD AUTO: 0.53 K/UL (ref 0–0.85)
MONOCYTES NFR BLD AUTO: 7.6 % (ref 0–13.4)
NEUTROPHILS # BLD AUTO: 3.88 K/UL (ref 1.82–7.42)
NEUTROPHILS NFR BLD: 56 % (ref 44–72)
NRBC # BLD AUTO: 0 K/UL
NRBC BLD-RTO: 0 /100 WBC (ref 0–0.2)
PLATELET # BLD AUTO: 285 K/UL (ref 164–446)
PMV BLD AUTO: 9.7 FL (ref 9–12.9)
POTASSIUM SERPL-SCNC: 4.6 MMOL/L (ref 3.6–5.5)
PROT SERPL-MCNC: 6.6 G/DL (ref 6–8.2)
RBC # BLD AUTO: 4.93 M/UL (ref 4.7–6.1)
SODIUM SERPL-SCNC: 138 MMOL/L (ref 135–145)
TRIGL SERPL-MCNC: 89 MG/DL (ref 0–149)
VIT B12 SERPL-MCNC: 942 PG/ML (ref 211–911)
WBC # BLD AUTO: 6.9 K/UL (ref 4.8–10.8)

## 2023-06-27 PROCEDURE — 80053 COMPREHEN METABOLIC PANEL: CPT

## 2023-06-27 PROCEDURE — 80061 LIPID PANEL: CPT

## 2023-06-27 PROCEDURE — 85025 COMPLETE CBC W/AUTO DIFF WBC: CPT

## 2023-06-27 PROCEDURE — 36415 COLL VENOUS BLD VENIPUNCTURE: CPT

## 2023-06-27 PROCEDURE — 82306 VITAMIN D 25 HYDROXY: CPT

## 2023-06-27 PROCEDURE — 82570 ASSAY OF URINE CREATININE: CPT

## 2023-06-27 PROCEDURE — 82043 UR ALBUMIN QUANTITATIVE: CPT

## 2023-06-27 PROCEDURE — 82607 VITAMIN B-12: CPT

## 2023-08-08 ENCOUNTER — OFFICE VISIT (OUTPATIENT)
Dept: MEDICAL GROUP | Facility: PHYSICIAN GROUP | Age: 78
End: 2023-08-08
Payer: MEDICARE

## 2023-08-08 VITALS
TEMPERATURE: 97.8 F | RESPIRATION RATE: 12 BRPM | HEART RATE: 73 BPM | OXYGEN SATURATION: 95 % | HEIGHT: 70 IN | WEIGHT: 156.5 LBS | DIASTOLIC BLOOD PRESSURE: 50 MMHG | SYSTOLIC BLOOD PRESSURE: 90 MMHG | BODY MASS INDEX: 22.41 KG/M2

## 2023-08-08 DIAGNOSIS — E11.69 HYPERLIPIDEMIA ASSOCIATED WITH TYPE 2 DIABETES MELLITUS (HCC): ICD-10-CM

## 2023-08-08 DIAGNOSIS — E78.5 HYPERLIPIDEMIA ASSOCIATED WITH TYPE 2 DIABETES MELLITUS (HCC): ICD-10-CM

## 2023-08-08 DIAGNOSIS — Z11.59 ENCOUNTER FOR HEPATITIS C SCREENING TEST FOR LOW RISK PATIENT: ICD-10-CM

## 2023-08-08 DIAGNOSIS — E11.8 CONTROLLED TYPE 2 DIABETES MELLITUS WITH COMPLICATION, WITHOUT LONG-TERM CURRENT USE OF INSULIN (HCC): ICD-10-CM

## 2023-08-08 DIAGNOSIS — F32.5 MAJOR DEPRESSIVE DISORDER WITH SINGLE EPISODE, IN FULL REMISSION (HCC): ICD-10-CM

## 2023-08-08 DIAGNOSIS — E53.8 B12 DEFICIENCY: ICD-10-CM

## 2023-08-08 LAB
HBA1C MFR BLD: 6.6 % (ref ?–5.8)
POCT INT CON NEG: NEGATIVE
POCT INT CON POS: POSITIVE

## 2023-08-08 PROCEDURE — 3078F DIAST BP <80 MM HG: CPT | Performed by: INTERNAL MEDICINE

## 2023-08-08 PROCEDURE — 3074F SYST BP LT 130 MM HG: CPT | Performed by: INTERNAL MEDICINE

## 2023-08-08 PROCEDURE — 99214 OFFICE O/P EST MOD 30 MIN: CPT | Performed by: INTERNAL MEDICINE

## 2023-08-08 PROCEDURE — 83036 HEMOGLOBIN GLYCOSYLATED A1C: CPT | Performed by: INTERNAL MEDICINE

## 2023-08-08 ASSESSMENT — FIBROSIS 4 INDEX: FIB4 SCORE: 0.88

## 2023-08-08 NOTE — PROGRESS NOTES
"Subjective:   Chief Complaint/History of Present Illness:  Fara Chavira is a 77 y.o. male established patient who presents today to discuss medical problems as listed below. Fara is is accompanied by his wife for today's visit.    Problem   B12 Deficiency     Latest Reference Range & Units 12/21/22 09:32 06/27/23 09:49   Vitamin B12 -True Cobalamin 211 - 911 pg/mL 274 942 (H)     He has a history of B12 deficiency. No neuropathy or gait instability at this time. Previously taking 3,000 mcg daily with elevated levels, recommend he take 100 mcg daily instead.     Controlled Type 2 Diabetes Mellitus With Complication, Without Long-Term Current Use of Insulin ([facility])     Latest Reference Range & Units 12/27/22 11:41 04/12/23 09:47 08/08/23 11:07   Glycohemoglobin 5.8 % 8.1 ! 7.1 ! 6.6 !     A1c 7.1-8.7 over recent years, much lower with use of Rybelsus which is causing nausea and weight loss more than he would like, will hold at this time.    Patient and his wife think his DM2 was diagnosed around [id number]. He was on metformin and glipizide for several years with stable blood sugars and ARB was added eventually for \"kidney disease\" per patient. Initially, his providers were only checking A1c once a year. He denies history of retinopathy or neuropathy. Agreeable to trying Rybelsus which was increased in 4/2023, levels doing better. Will stop glipizide in 4/2023.    ARB discontinued due to hypotension and concerns for orthostasis.    No nephropathy or kidney disease on lab testing. Eye exam completed by Dr. Jean Baptiste was normal in Nov 2022.    Current regimen: metformin 1000 mg BID  His regimen: Rybelsus 14 mg daily, currently on hold due to weight loss of 20 pounds and ongoing nausea     ([facility]) Hyperlipidemia associated with type 2 diabetes mellitus     Latest Reference Range & Units 12/21/22 09:32   Cholesterol,Tot 100 - 199 mg/dL 140   Triglycerides 0 - 149 mg/dL 134   HDL >=40 mg/dL 35 !   LDL <100 mg/dL 78     He " "was initiated on statin by his previous provider. No known myalgias or side effects. Lipid panel as above is stable.    Current regimen: simvastatin 20 mg daily     Major Depressive Disorder With Single Episode, in Full Remission ([facility])    He has a history of depression for which he utilizes SSRI. Mood is doing well on the current regimen. No SI/HI. No side effects from the medication.     Current regimen: fluoxetine 20 mg daily            Current Medications:  Current Outpatient Medications Ordered in Epic   Medication Sig Dispense Refill    alfuzosin (UROXATRAL) 10 MG SR tablet Take 1 Tablet by mouth every day. 100 Tablet 3    metFORMIN ER (GLUCOPHAGE XR) 500 MG TABLET SR 24 HR Take 2 Tablets by mouth 2 times a day. 400 Tablet 3    FLUoxetine (PROZAC) 20 MG Cap Take 1 Capsule by mouth every day. 100 Capsule 3    Cyanocobalamin (B-12) 100 MCG Tab Take 1 Tablet by mouth every day.      simvastatin (ZOCOR) 20 MG Tab Take 1 Tablet by mouth every evening. 100 Tablet 3    ASPIRIN 81 PO Take 1 Each by mouth every day at 6 PM.      cholecalciferol (VITAMIN D3) 400 UNIT Tab Take 400 Units by mouth every day.       No current [facility]-ordered facility-administered medications on file.          Objective:   Physical Exam:    Vitals: BP 90/50 (BP Location: Left arm, Patient Position: Sitting, BP Cuff Size: Adult)   Pulse 73   Temp 36.6 °C (97.8 °F) (Temporal)   Resp 12   Ht 1.778 m (5' 10\")   Wt 71 kg (156 lb 8 oz)   SpO2 95%    BMI: Body mass index is 22.46 kg/m².  Physical Exam  Constitutional:       General: He is not in acute distress.     Appearance: Normal appearance. He is not ill-appearing.   HENT:      Right Ear: Ear canal and external ear normal. There is no impacted cerumen.      Left Ear: Ear canal and external ear normal. There is no impacted cerumen.   Eyes:      General: No scleral icterus.     Conjunctiva/sclera: Conjunctivae normal.   Cardiovascular:      Rate and Rhythm: Normal rate and regular rhythm.      " Pulses: Normal pulses.      Heart sounds: No murmur heard.  Pulmonary:      Effort: Pulmonary effort is normal. No respiratory distress.      Breath sounds: Normal breath sounds. No wheezing, rhonchi or rales.   Abdominal:      General: Bowel sounds are normal. There is no distension.      Palpations: Abdomen is soft.      Tenderness: There is no abdominal tenderness.   Musculoskeletal:      Right lower leg: No edema.      Left lower leg: No edema.   Lymphadenopathy:      Cervical: No cervical adenopathy.   Skin:     General: Skin is warm and dry.      Findings: No rash.   Neurological:      Gait: Gait normal.   Psychiatric:         Mood and Affect: Mood normal.         Behavior: Behavior normal.         Thought Content: Thought content normal.         Judgment: Judgment normal.               Assessment and Plan:   Fara is a 77 y.o. male with the following:  Problem List Items Addressed This Visit       ([facility]) Hyperlipidemia associated with type 2 diabetes mellitus     Chronic stable problem.  Lipids are well controlled, continue medical therapy with simvastatin 20 mg daily.  Update lipid panel before next visit to ensure ongoing stability.         Relevant Orders    FREE THYROXINE    TSH    VITAMIN B12    VITAMIN D,25 HYDROXY (DEFICIENCY)    Lipid Profile    Comp Metabolic Panel    CBC WITH DIFFERENTIAL    B12 deficiency     Previous problem, resolved by adding back B12 supplement.  Deficiency secondary to use of metformin.         Controlled type 2 diabetes mellitus with complication, without long-term current use of insulin ([facility])     Improved problem, A1c doing very well however he is having a lot of side effects from the Rybelsus and went off of it about a month ago.  He thinks he did better on the lower dose.  We will give him a break for the next 3 months with no Rybelsus and continue with metformin 1000 mg twice daily.  If A1c goes up dramatically then can add back lower dose of Rybelsus at 3.5 to 7 mg  daily.         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  Also is interested in old [employer] cars and was previously on a car club and may get back into that.  Doing brain teaser activities on an lety that his wife downloaded for him.          Other Visit Diagnoses       Encounter for hepatitis C screening test for low risk patient        Relevant Orders    HEP C VIRUS ANTIBODY               RTC: Return in about 3 months (around 11/8/2023).    I spent a total of 34 minutes with record review, exam, communication with the patient, communication with other providers, and documentation of this encounter.    PLEASE NOTE: This dictation was created using voice recognition software. I have made every reasonable attempt to correct obvious errors, but I expect that there are errors of grammar and possibly content that I did not discover before finalizing the note.      Melissa Miller, DO  Geriatric and Internal Medicine  [facility]

## 2023-08-08 NOTE — ASSESSMENT & PLAN NOTE
Improved problem, A1c doing very well however he is having a lot of side effects from the Rybelsus and went off of it about a month ago.  He thinks he did better on the lower dose.  We will give him a break for the next 3 months with no Rybelsus and continue with metformin 1000 mg twice daily.  If A1c goes up dramatically then can add back lower dose of Rybelsus at 3.5 to 7 mg daily.

## 2023-08-08 NOTE — ASSESSMENT & PLAN NOTE
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  Also is interested in old [place] cars and was previously on a car club and may get back into that.  Doing brain teaser activities on an lety that his wife downloaded for him.

## 2023-08-08 NOTE — ASSESSMENT & PLAN NOTE
Previous problem, resolved by adding back B12 supplement.  Deficiency secondary to use of metformin.

## 2023-08-08 NOTE — ASSESSMENT & PLAN NOTE
Chronic stable problem.  Lipids are well controlled, continue medical therapy with simvastatin 20 mg daily.  Update lipid panel before next visit to ensure ongoing stability.

## 2023-08-20 DIAGNOSIS — U07.1 COVID-19 VIRUS INFECTION: ICD-10-CM

## 2023-09-11 NOTE — ASSESSMENT & PLAN NOTE
Chronic and stable problem.  Continue observing blood pressure without any formal pharmacotherapy, he is taking alfluzosin to have some blood pressure lowering effects.  Recheck metabolic panel in August 2020.   Olumiant Counseling: I discussed with the patient the risks of Olumiant therapy including but not limited to upper respiratory tract infections, shingles, cold sores, and nausea. Live vaccines should be avoided.  This medication has been linked to serious infections; higher rate of mortality; malignancy and lymphoproliferative disorders; major adverse cardiovascular events; thrombosis; gastrointestinal perforations; neutropenia; lymphopenia; anemia; liver enzyme elevations; and lipid elevations.

## 2023-11-15 ENCOUNTER — OFFICE VISIT (OUTPATIENT)
Dept: MEDICAL GROUP | Facility: PHYSICIAN GROUP | Age: 78
End: 2023-11-15
Payer: MEDICARE

## 2023-11-15 VITALS
SYSTOLIC BLOOD PRESSURE: 102 MMHG | BODY MASS INDEX: 24.15 KG/M2 | HEART RATE: 70 BPM | HEIGHT: 70 IN | WEIGHT: 168.7 LBS | RESPIRATION RATE: 16 BRPM | TEMPERATURE: 98 F | DIASTOLIC BLOOD PRESSURE: 60 MMHG | OXYGEN SATURATION: 97 %

## 2023-11-15 DIAGNOSIS — F32.5 MAJOR DEPRESSIVE DISORDER WITH SINGLE EPISODE, IN FULL REMISSION (HCC): ICD-10-CM

## 2023-11-15 DIAGNOSIS — E78.5 HYPERLIPIDEMIA ASSOCIATED WITH TYPE 2 DIABETES MELLITUS (HCC): ICD-10-CM

## 2023-11-15 DIAGNOSIS — E11.8 CONTROLLED TYPE 2 DIABETES MELLITUS WITH COMPLICATION, WITHOUT LONG-TERM CURRENT USE OF INSULIN (HCC): ICD-10-CM

## 2023-11-15 DIAGNOSIS — E11.69 HYPERLIPIDEMIA ASSOCIATED WITH TYPE 2 DIABETES MELLITUS (HCC): ICD-10-CM

## 2023-11-15 DIAGNOSIS — G31.84 MILD COGNITIVE IMPAIRMENT: ICD-10-CM

## 2023-11-15 LAB
HBA1C MFR BLD: 8.5 % (ref ?–5.8)
POCT INT CON NEG: NEGATIVE
POCT INT CON POS: POSITIVE

## 2023-11-15 PROCEDURE — 3078F DIAST BP <80 MM HG: CPT | Performed by: INTERNAL MEDICINE

## 2023-11-15 PROCEDURE — 99215 OFFICE O/P EST HI 40 MIN: CPT | Performed by: INTERNAL MEDICINE

## 2023-11-15 PROCEDURE — 3074F SYST BP LT 130 MM HG: CPT | Performed by: INTERNAL MEDICINE

## 2023-11-15 PROCEDURE — 83036 HEMOGLOBIN GLYCOSYLATED A1C: CPT | Performed by: INTERNAL MEDICINE

## 2023-11-15 ASSESSMENT — MONTREAL COGNITIVE ASSESSMENT (MOCA)
2. COPY DRAWING: 1/1
4. NAME EACH OF THE THREE ANIMALS SHOWN: 3/3
WHAT IS THE TOTAL SCORE (OUT OF 30): 20
DELAYED RECALL SUBSCORE: 0/5
ORIENTATION SUBSCORE: 5/6
10. [FLUENCY] NAME WORDS STARTING WITH DESIGNATED LETTER: 0/1
1. ALTERNATING TRAIL MAKING: 1/1
3. DRAW A CLOCK: CONTOUR, NUMBERS, HANDS: 3/3
6. READ LIST OF DIGITS [FORWARD/BACKWARD]: 1/2
8. SERIAL SUBTRACTION OF 7S: 4 OR 5/5
7. [VIGILENCE] TAP WHEN HEARING DESIGNATED LETTER: 1/1
9. REPEAT EACH SENTENCE: 0/2
11. FOR EACH PAIR OF WORDS, WHAT CATEGORY DO THEY BELONG TO (OUT OF 2): 2/2
WHAT IS THE VERSION OF MOCA ADMINISTERED: 1
5. MEMORY TRIALS: FIRST TRIAL;SECOND TRIAL

## 2023-11-15 ASSESSMENT — FIBROSIS 4 INDEX: FIB4 SCORE: 0.89

## 2023-11-16 NOTE — ASSESSMENT & PLAN NOTE
Chronic decompensated problem.  Metformin at 1000 mg twice daily will be continued, he is experiencing more diarrhea, did not tolerate oral Rybelsus but may do better with low-dose Trulicity.  Start Trulicity 0.75 mg weekly.  We will start with a 4-week supply and they can update me with tolerance. 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 Retinal exam scheduled on Dec 4th 2023 with Dr. Jean Baptiste's office.  He will update his lab work before the end of the year.

## 2023-11-16 NOTE — ASSESSMENT & PLAN NOTE
New decompensated problem, he has some insight that his memory has worsened in recent time especially with word finding and navigational skills.  Family notes he is repeating stories or he forgets things that they have discussed with him.  Mo[facility] examination notable for 0 out of 5 words with delayed recall as well as difficulty with repeating phrases and language fluency.  Will refer to neurology additional evaluation, may need neuropsychiatric testing to determine nuances of his impairment.  He is agreeable to the referral.

## 2023-11-16 NOTE — ASSESSMENT & PLAN NOTE
Chronic stable problem, denies any worsening of mood, denies depression or anxiety, continue fluoxetine 20 mg daily.

## 2023-11-16 NOTE — PROGRESS NOTES
"Subjective:   Chief Complaint/History of Present Illness:  Fara Chavira is a 78 y.o. male established patient who presents today to discuss medical problems as listed below. Fara is is accompanied by his wife for today's visit.    Problem   Mild Cognitive Impairment    [place] Cognitive Assessment (MOCA) Version Number: 1   VISUOSPACIAL / EXECUTIVE   Clock Drawing: 3/3  Spatial Drawin/1  Cube Drawin/1    NAMING  Naming: 3/3    MEMORY  Memory: First trial;Second trial    ATTENTION  Digits: 1  Letters:   Subtraction: 4 or 5/5    LANGUAGE  Repeat Phrases: 0/2  Fluency: 0/1    ABSTRACTION  Abstraction: 2/2    DELAYED RECALL  Recall words: 0/5  Category Cue (if applicable):    Multiple Choice Cue (if applicable):     ORIENTATION  Orientation: 5/6    Add 1 point if less than or equal to 12 yr education level:     MOCA TOTAL SCORE:  20 /30  Biomechanical/Visual Limitations (if applicable):       His wife and children had noticed that he was more forgetful in recent time often repeating stories or asking them to repeat details of things had already discussed with him.  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  Worsened navigational skills.  Some trouble with word finding.  Reports he speaks 4 languages but English has been his primary language for years but [place] is his mother tongue.     Controlled Type 2 Diabetes Mellitus With Complication, Without Long-Term Current Use of Insulin ([facility])     Latest Reference Range & Units 23 11:07 11/15/23 16:19   Glycohemoglobin 5.8 % 6.6 ! 8.5 !     Patient and his wife think his DM2 was diagnosed around [id number]. He was on metformin and glipizide for several years with stable blood sugars and ARB was added eventually for \"kidney disease\" per patient. Initially, his providers were only checking A1c once a year. He denies history of retinopathy or neuropathy. Agreeable to trying Rybelsus which was increased in 2023, levels doing better. Will stop " glipizide in 4/2023.    ARB discontinued due to hypotension and concerns for orthostasis.    No nephropathy or kidney disease on lab testing. Eye exam completed by Dr. Jean Baptiste was normal in Nov 2022 and scheduled again in Dec 2023.    A1c 7.1-8.7 over recent years, much lower with use of Rybelsus which is causing nausea and weight loss more than he would like, will hold at this time.  Due to deterioration of values well trial Trulicity 0.75 mg weekly.    Current regimen: metformin 1000 mg BID and Trulicity 0.75 mg every 7 days  His regimen: Rybelsus 14 mg daily, currently on hold due to weight loss of 20 pounds and ongoing nausea     (HCC) Hyperlipidemia associated with type 2 diabetes mellitus     Latest Reference Range & Units 12/21/22 09:32   Cholesterol,Tot 100 - 199 mg/dL 140   Triglycerides 0 - 149 mg/dL 134   HDL >=40 mg/dL 35 !   LDL <100 mg/dL 78     He was initiated on statin by his previous provider. No known myalgias or side effects. Lipid panel as above is stable.    Current regimen: simvastatin 20 mg daily     Major Depressive Disorder With Single Episode, in Full Remission (Hcc)    He has a history of depression for which he utilizes SSRI. Mood is doing well on the current regimen. No SI/HI. No side effects from the medication.     Current regimen: fluoxetine 20 mg daily            Current Medications:  Current Outpatient Medications Ordered in Epic   Medication Sig Dispense Refill    Dulaglutide 0.75 MG/0.5ML Solution Pen-injector Inject 0.5 mL under the skin every 7 days. 1.96 mL 3    Nirmatrelvir&Ritonavir 300/100 20 x 150 MG & 10 x 100MG Tablet Therapy Pack Take 300 mg nirmatrelvir (two 150 mg tablets) with 100 mg ritonavir (one 100 mg tablet) by mouth, with all three tablets taken together twice daily for 5 days. 30 Each 0    alfuzosin (UROXATRAL) 10 MG SR tablet Take 1 Tablet by mouth every day. 100 Tablet 3    metFORMIN ER (GLUCOPHAGE XR) 500 MG TABLET SR 24 HR Take 2 Tablets by mouth 2 times a  "day. 400 Tablet 3    FLUoxetine (PROZAC) 20 MG Cap Take 1 Capsule by mouth every day. 100 Capsule 3    Cyanocobalamin (B-12) 100 MCG Tab Take 1 Tablet by mouth every day.      simvastatin (ZOCOR) 20 MG Tab Take 1 Tablet by mouth every evening. 100 Tablet 3    ASPIRIN 81 PO Take 1 Each by mouth every day at 6 PM.      cholecalciferol (VITAMIN D3) 400 UNIT Tab Take 400 Units by mouth every day.       No current [facility]-ordered facility-administered medications on file.          Objective:   Physical Exam:    Vitals: /60 (BP Location: Right arm, Patient Position: Sitting)   Pulse 70   Temp 36.7 °C (98 °F) (Temporal)   Resp 16   Ht 1.778 m (5' 10\")   Wt 76.5 kg (168 lb 11.2 oz)   SpO2 97%    BMI: Body mass index is 24.21 kg/m².  Physical Exam  Constitutional:       General: He is not in acute distress.     Appearance: Normal appearance. He is not ill-appearing.   HENT:      Right Ear: Ear canal normal. There is no impacted cerumen.      Left Ear: Ear canal normal. There is no impacted cerumen.   Eyes:      General: No scleral icterus.     Conjunctiva/sclera: Conjunctivae normal.   Cardiovascular:      Rate and Rhythm: Normal rate and regular rhythm.      Pulses: Normal pulses.      Heart sounds: No murmur heard.  Pulmonary:      Effort: Pulmonary effort is normal. No respiratory distress.      Breath sounds: Normal breath sounds. No wheezing, rhonchi or rales.   Abdominal:      General: Bowel sounds are normal. There is no distension.      Palpations: Abdomen is soft.      Tenderness: There is no abdominal tenderness.   Musculoskeletal:      Right lower leg: No edema.      Left lower leg: No edema.   Lymphadenopathy:      Cervical: No cervical adenopathy.   Skin:     General: Skin is warm and dry.      Findings: No rash.   Neurological:      Gait: Gait normal.   Psychiatric:         Mood and Affect: Mood normal.         Behavior: Behavior normal.         Thought Content: Thought content normal.         " Judgment: Judgment normal.               Assessment and Plan:   Fara is a 78 y.o. male with the following:  Problem List Items Addressed This Visit       ([facility]) Hyperlipidemia associated with type 2 diabetes mellitus     Previous problem, requires follow-up, will have him update lipid panel and can adjust medication as needed.  In the meantime continue simvastatin 20 mg daily.         Relevant Medications    Dulaglutide 0.75 MG/0.5ML Solution Pen-injector    Controlled type 2 diabetes mellitus with complication, without long-term current use of insulin ([facility])     Chronic decompensated problem.  Metformin at 1000 mg twice daily will be continued, he is experiencing more diarrhea, did not tolerate oral Rybelsus but may do better with low-dose Trulicity.  Start Trulicity 0.75 mg weekly.  We will start with a 4-week supply and they can update me with tolerance. 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 Retinal exam scheduled on Dec 4th 2023 with Dr. Jean Baptiste's office.  He will update his lab work before the end of the year.         Relevant Medications    Dulaglutide 0.75 MG/0.5ML Solution Pen-injector    Other Relevant Orders    POCT Hemoglobin A1C (Completed)    Diabetic Monofilament Lower Extremity Exam (Completed)    Major depressive disorder with single episode, in full remission ([facility])     Chronic stable problem, denies any worsening of mood, denies depression or anxiety, continue fluoxetine 20 mg daily.         Mild cognitive impairment     New decompensated problem, he has some insight that his memory has worsened in recent time especially with word finding and navigational skills.  Family notes he is repeating stories or he forgets things that they have discussed with him.  MoCA examination notable for 0 out of 5 words with delayed recall as well as difficulty with repeating phrases and language fluency.  Will refer to neurology additional evaluation, may need neuropsychiatric testing to determine nuances of  his impairment.  He is agreeable to the referral.         Relevant Orders    Referral to Neurology          RTC: Return in about 3 months (around 2/15/2024).    I spent a total of 42 minutes with record review, exam, communication with the patient, communication with other providers, and documentation of this encounter.    PLEASE NOTE: This dictation was created using voice recognition software. I have made every reasonable attempt to correct obvious errors, but I expect that there are errors of grammar and possibly content that I did not discover before finalizing the note.      Melissa Miller, DO  Geriatric and Internal Medicine  [facility]

## 2023-11-16 NOTE — ASSESSMENT & PLAN NOTE
Previous problem, requires follow-up, will have him update lipid panel and can adjust medication as needed.  In the meantime continue simvastatin 20 mg daily.

## 2023-12-28 ENCOUNTER — PATIENT MESSAGE (OUTPATIENT)
Dept: MEDICAL GROUP | Facility: PHYSICIAN GROUP | Age: 78
End: 2023-12-28
Payer: MEDICARE

## 2023-12-28 RX ORDER — TERAZOSIN 1 MG/1
1 CAPSULE ORAL NIGHTLY
Qty: 100 CAPSULE | Refills: 3 | Status: SHIPPED | OUTPATIENT
Start: 2023-12-28

## 2023-12-28 RX ORDER — TERAZOSIN 1 MG/1
1 CAPSULE ORAL NIGHTLY
COMMUNITY
End: 2023-12-28 | Stop reason: SDUPTHER

## 2023-12-30 ENCOUNTER — PATIENT MESSAGE (OUTPATIENT)
Dept: MEDICAL GROUP | Facility: PHYSICIAN GROUP | Age: 78
End: 2023-12-30
Payer: MEDICARE

## 2023-12-30 DIAGNOSIS — E11.8 CONTROLLED TYPE 2 DIABETES MELLITUS WITH COMPLICATION, WITHOUT LONG-TERM CURRENT USE OF INSULIN (HCC): ICD-10-CM

## 2024-01-02 DIAGNOSIS — E11.69 HYPERLIPIDEMIA ASSOCIATED WITH TYPE 2 DIABETES MELLITUS (HCC): ICD-10-CM

## 2024-01-02 DIAGNOSIS — E78.5 HYPERLIPIDEMIA ASSOCIATED WITH TYPE 2 DIABETES MELLITUS (HCC): ICD-10-CM

## 2024-01-02 RX ORDER — SIMVASTATIN 20 MG
20 TABLET ORAL EVERY EVENING
Qty: 100 TABLET | Refills: 3 | Status: SHIPPED | OUTPATIENT
Start: 2024-01-02

## 2024-01-02 NOTE — PATIENT COMMUNICATION
Received request via: Patient    Was the patient seen in the last year in this department? Yes    Does the patient have an active prescription (recently filled or refills available) for medication(s) requested? No    Does the patient have [facility] Plus and need 100 day supply (blood pressure, diabetes and cholesterol meds only)? Yes, quantity updated to 100 days

## 2024-01-23 DIAGNOSIS — F39 MOOD DISORDER (HCC): ICD-10-CM

## 2024-01-23 RX ORDER — FLUOXETINE HYDROCHLORIDE 20 MG/1
20 CAPSULE ORAL
Qty: 100 CAPSULE | Refills: 3 | Status: SHIPPED | OUTPATIENT
Start: 2024-01-23

## 2024-01-23 NOTE — TELEPHONE ENCOUNTER
Received request via: Patient    Was the patient seen in the last year in this department? Yes    Does the patient have an active prescription (recently filled or refills available) for medication(s) requested? No    Pharmacy Name: [facility] [address]    Does the patient have [facility] and need 100 day supply (blood pressure, diabetes and cholesterol meds only)? Medication is not for cholesterol, blood pressure or diabetes

## 2024-02-20 ENCOUNTER — HOSPITAL ENCOUNTER (OUTPATIENT)
Dept: LAB | Facility: MEDICAL CENTER | Age: 79
End: 2024-02-20
Attending: INTERNAL MEDICINE
Payer: MEDICARE

## 2024-02-20 DIAGNOSIS — E11.69 HYPERLIPIDEMIA ASSOCIATED WITH TYPE 2 DIABETES MELLITUS (HCC): ICD-10-CM

## 2024-02-20 DIAGNOSIS — Z11.59 ENCOUNTER FOR HEPATITIS C SCREENING TEST FOR LOW RISK PATIENT: ICD-10-CM

## 2024-02-20 DIAGNOSIS — E78.5 HYPERLIPIDEMIA ASSOCIATED WITH TYPE 2 DIABETES MELLITUS (HCC): ICD-10-CM

## 2024-02-20 DIAGNOSIS — E11.8 CONTROLLED TYPE 2 DIABETES MELLITUS WITH COMPLICATION, WITHOUT LONG-TERM CURRENT USE OF INSULIN (HCC): ICD-10-CM

## 2024-02-20 LAB
25(OH)D3 SERPL-MCNC: 56 NG/ML (ref 30–100)
ALBUMIN SERPL BCP-MCNC: 4.1 G/DL (ref 3.2–4.9)
ALBUMIN/GLOB SERPL: 1.5 G/DL
ALP SERPL-CCNC: 55 U/L (ref 30–99)
ALT SERPL-CCNC: 21 U/L (ref 2–50)
ANION GAP SERPL CALC-SCNC: 15 MMOL/L (ref 7–16)
AST SERPL-CCNC: 15 U/L (ref 12–45)
BASOPHILS # BLD AUTO: 0.9 % (ref 0–1.8)
BASOPHILS # BLD: 0.06 K/UL (ref 0–0.12)
BILIRUB SERPL-MCNC: 0.8 MG/DL (ref 0.1–1.5)
BUN SERPL-MCNC: 13 MG/DL (ref 8–22)
CALCIUM ALBUM COR SERPL-MCNC: 9.2 MG/DL (ref 8.5–10.5)
CALCIUM SERPL-MCNC: 9.3 MG/DL (ref 8.5–10.5)
CHLORIDE SERPL-SCNC: 101 MMOL/L (ref 96–112)
CHOLEST SERPL-MCNC: 105 MG/DL (ref 100–199)
CO2 SERPL-SCNC: 23 MMOL/L (ref 20–33)
CREAT SERPL-MCNC: 0.86 MG/DL (ref 0.5–1.4)
CREAT UR-MCNC: 102.33 MG/DL
EOSINOPHIL # BLD AUTO: 0.09 K/UL (ref 0–0.51)
EOSINOPHIL NFR BLD: 1.3 % (ref 0–6.9)
ERYTHROCYTE [DISTWIDTH] IN BLOOD BY AUTOMATED COUNT: 44.8 FL (ref 35.9–50)
EST. AVERAGE GLUCOSE BLD GHB EST-MCNC: 146 MG/DL
GFR SERPLBLD CREATININE-BSD FMLA CKD-EPI: 88 ML/MIN/1.73 M 2
GLOBULIN SER CALC-MCNC: 2.7 G/DL (ref 1.9–3.5)
GLUCOSE SERPL-MCNC: 117 MG/DL (ref 65–99)
HBA1C MFR BLD: 6.7 % (ref 4–5.6)
HCT VFR BLD AUTO: 42.1 % (ref 42–52)
HCV AB SER QL: NORMAL
HDLC SERPL-MCNC: 37 MG/DL
HGB BLD-MCNC: 14.1 G/DL (ref 14–18)
IMM GRANULOCYTES # BLD AUTO: 0.02 K/UL (ref 0–0.11)
IMM GRANULOCYTES NFR BLD AUTO: 0.3 % (ref 0–0.9)
LDLC SERPL CALC-MCNC: 56 MG/DL
LYMPHOCYTES # BLD AUTO: 2.44 K/UL (ref 1–4.8)
LYMPHOCYTES NFR BLD: 35.1 % (ref 22–41)
MCH RBC QN AUTO: 30.3 PG (ref 27–33)
MCHC RBC AUTO-ENTMCNC: 33.5 G/DL (ref 32.3–36.5)
MCV RBC AUTO: 90.5 FL (ref 81.4–97.8)
MICROALBUMIN UR-MCNC: <1.2 MG/DL
MICROALBUMIN/CREAT UR: NORMAL MG/G (ref 0–30)
MONOCYTES # BLD AUTO: 0.48 K/UL (ref 0–0.85)
MONOCYTES NFR BLD AUTO: 6.9 % (ref 0–13.4)
NEUTROPHILS # BLD AUTO: 3.86 K/UL (ref 1.82–7.42)
NEUTROPHILS NFR BLD: 55.5 % (ref 44–72)
NRBC # BLD AUTO: 0 K/UL
NRBC BLD-RTO: 0 /100 WBC (ref 0–0.2)
PLATELET # BLD AUTO: 306 K/UL (ref 164–446)
PMV BLD AUTO: 9.2 FL (ref 9–12.9)
POTASSIUM SERPL-SCNC: 4.4 MMOL/L (ref 3.6–5.5)
PROT SERPL-MCNC: 6.8 G/DL (ref 6–8.2)
RBC # BLD AUTO: 4.65 M/UL (ref 4.7–6.1)
SODIUM SERPL-SCNC: 139 MMOL/L (ref 135–145)
T4 FREE SERPL-MCNC: 1.05 NG/DL (ref 0.93–1.7)
TRIGL SERPL-MCNC: 58 MG/DL (ref 0–149)
TSH SERPL DL<=0.005 MIU/L-ACNC: 3.09 UIU/ML (ref 0.38–5.33)
VIT B12 SERPL-MCNC: 646 PG/ML (ref 211–911)
WBC # BLD AUTO: 7 K/UL (ref 4.8–10.8)

## 2024-02-20 PROCEDURE — 82043 UR ALBUMIN QUANTITATIVE: CPT

## 2024-02-20 PROCEDURE — 82570 ASSAY OF URINE CREATININE: CPT

## 2024-02-20 PROCEDURE — 80053 COMPREHEN METABOLIC PANEL: CPT

## 2024-02-20 PROCEDURE — 82306 VITAMIN D 25 HYDROXY: CPT

## 2024-02-20 PROCEDURE — 85025 COMPLETE CBC W/AUTO DIFF WBC: CPT

## 2024-02-20 PROCEDURE — 36415 COLL VENOUS BLD VENIPUNCTURE: CPT

## 2024-02-20 PROCEDURE — 83036 HEMOGLOBIN GLYCOSYLATED A1C: CPT

## 2024-02-20 PROCEDURE — 84439 ASSAY OF FREE THYROXINE: CPT

## 2024-02-20 PROCEDURE — 84443 ASSAY THYROID STIM HORMONE: CPT

## 2024-02-20 PROCEDURE — 86803 HEPATITIS C AB TEST: CPT

## 2024-02-20 PROCEDURE — 80061 LIPID PANEL: CPT

## 2024-02-20 PROCEDURE — 82607 VITAMIN B-12: CPT

## 2024-02-21 ENCOUNTER — OFFICE VISIT (OUTPATIENT)
Dept: MEDICAL GROUP | Facility: PHYSICIAN GROUP | Age: 79
End: 2024-02-21
Payer: MEDICARE

## 2024-02-21 VITALS
HEART RATE: 84 BPM | BODY MASS INDEX: 23.61 KG/M2 | DIASTOLIC BLOOD PRESSURE: 60 MMHG | HEIGHT: 70 IN | SYSTOLIC BLOOD PRESSURE: 94 MMHG | OXYGEN SATURATION: 97 % | TEMPERATURE: 98.1 F | WEIGHT: 164.9 LBS | RESPIRATION RATE: 16 BRPM

## 2024-02-21 DIAGNOSIS — F32.5 MAJOR DEPRESSIVE DISORDER WITH SINGLE EPISODE, IN FULL REMISSION (HCC): ICD-10-CM

## 2024-02-21 DIAGNOSIS — E78.5 HYPERLIPIDEMIA ASSOCIATED WITH TYPE 2 DIABETES MELLITUS (HCC): ICD-10-CM

## 2024-02-21 DIAGNOSIS — Z00.00 ENCOUNTER FOR SUBSEQUENT ANNUAL WELLNESS VISIT (AWV) IN MEDICARE PATIENT: Primary | ICD-10-CM

## 2024-02-21 DIAGNOSIS — E11.69 HYPERLIPIDEMIA ASSOCIATED WITH TYPE 2 DIABETES MELLITUS (HCC): ICD-10-CM

## 2024-02-21 DIAGNOSIS — G31.84 MILD COGNITIVE IMPAIRMENT: ICD-10-CM

## 2024-02-21 DIAGNOSIS — E11.8 CONTROLLED TYPE 2 DIABETES MELLITUS WITH COMPLICATION, WITHOUT LONG-TERM CURRENT USE OF INSULIN (HCC): ICD-10-CM

## 2024-02-21 PROCEDURE — 3074F SYST BP LT 130 MM HG: CPT | Performed by: INTERNAL MEDICINE

## 2024-02-21 PROCEDURE — G0439 PPPS, SUBSEQ VISIT: HCPCS | Performed by: INTERNAL MEDICINE

## 2024-02-21 PROCEDURE — 3078F DIAST BP <80 MM HG: CPT | Performed by: INTERNAL MEDICINE

## 2024-02-21 RX ORDER — METFORMIN HYDROCHLORIDE 500 MG/1
1000 TABLET, EXTENDED RELEASE ORAL 2 TIMES DAILY
Qty: 400 TABLET | Refills: 3 | Status: SHIPPED | OUTPATIENT
Start: 2024-02-21

## 2024-02-21 RX ORDER — ALFUZOSIN HYDROCHLORIDE 10 MG/1
1 TABLET, EXTENDED RELEASE ORAL DAILY
COMMUNITY

## 2024-02-21 ASSESSMENT — ACTIVITIES OF DAILY LIVING (ADL): BATHING_REQUIRES_ASSISTANCE: 0

## 2024-02-21 ASSESSMENT — FIBROSIS 4 INDEX: FIB4 SCORE: 0.85

## 2024-02-21 ASSESSMENT — ENCOUNTER SYMPTOMS: GENERAL WELL-BEING: EXCELLENT

## 2024-02-21 ASSESSMENT — PATIENT HEALTH QUESTIONNAIRE - PHQ9: CLINICAL INTERPRETATION OF PHQ2 SCORE: 0

## 2024-02-21 NOTE — ASSESSMENT & PLAN NOTE
Recent issue, did better on mini cog today, meet with Dr. Jama in 5 to 6 weeks to establish care and determine if any additional evaluation is needed at this time.

## 2024-02-21 NOTE — ASSESSMENT & PLAN NOTE
Chronic improved problem, A1c at goal at 6.7, continue metformin at 1000 mg twice daily Trulicity 0.7 mg weekly.  Tolerating without issue at this time.  LDL is at goal.  No microalbuminuria.  Retinal screening is up-to-date.  Recheck again in 3 months.

## 2024-02-21 NOTE — ASSESSMENT & PLAN NOTE
Chronic stable problem, lipids are at goal on current medical therapy, continue simvastatin 20 mg daily.  Next lipid panel due in 1 year.

## 2024-02-21 NOTE — ASSESSMENT & PLAN NOTE
Chronic stable problem, mood is well on current medical therapy, continue fluoxetine 20 mg daily.  Good eye contact and laughing spontaneously during interview.

## 2024-02-21 NOTE — PROGRESS NOTES
"Chief Complaint   Patient presents with    Follow-Up     Annual/ 3 month f/v   LOV 11/15/23  Labs yesterday  A1C 6.7 from 3 months ago 8.5     Bindu 2 weeks out from fx of knee cap        HPI:  Fara Chavira is a 78 y.o. here for Medicare Annual Wellness Visit     Problem   Mild Cognitive Impairment    Edgar Cognitive Assessment (MOCA) Version Number: 1   VISUOSPACIAL / EXECUTIVE   Clock Drawing: 3/3  Spatial Drawin/1  Cube Drawin/1    NAMING  Naming: 3/3    MEMORY  Memory: First trial;Second trial    ATTENTION  Digits: 1/2  Letters: 1  Subtraction: 4 or 5/5    LANGUAGE  Repeat Phrases: 0/2  Fluency: 0/1    ABSTRACTION  Abstraction: 2/2    DELAYED RECALL  Recall words: 0/5  Category Cue (if applicable):    Multiple Choice Cue (if applicable):     ORIENTATION  Orientation: 5/6    Add 1 point if less than or equal to 12 yr education level:     MOCA TOTAL SCORE:  20 /30  Biomechanical/Visual Limitations (if applicable):       His wife and children had noticed that he was more forgetful in recent time often repeating stories or asking them to repeat details of things had already discussed with him.  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  Worsened navigational skills.  Some trouble with word finding.  Reports he speaks 4 languages but English has been his primary language for years but [place] is his mother tongue.     Controlled Type 2 Diabetes Mellitus With Complication, Without Long-Term Current Use of Insulin ([facility])     Latest Reference Range & Units 24 08:17   Glycohemoglobin 4.0 - 5.6 % 6.7 (H)   Estim. Avg Glu mg/dL 146     Patient and his wife think his DM2 was diagnosed around [id number]. He was on metformin and glipizide for several years with stable blood sugars and ARB was added eventually for \"kidney disease\" per patient. Initially, his providers were only checking A1c once a year. He denies history of retinopathy or neuropathy. Agreeable to trying Rybelsus which was increased in " 4/2023, levels doing better. Will stop glipizide in 4/2023.    ARB discontinued due to hypotension and concerns for orthostasis.    No nephropathy or kidney disease on lab testing. Eye exam completed by Dr. Jean Baptiste was normal in Nov 2022 and scheduled again in Dec 2023.    A1c 7.1-8.7 over recent years, much lower with use of Rybelsus which is causing nausea and weight loss more than he would like, will hold at this time.  Due to deterioration of values well trial Trulicity 0.75 mg weekly.    Current regimen: metformin 1000 mg BID and Trulicity 0.75 mg every 7 days  His regimen: Rybelsus 14 mg daily, currently on hold due to weight loss of 20 pounds and ongoing nausea     ([facility]) Hyperlipidemia associated with type 2 diabetes mellitus     Latest Reference Range & Units 02/20/24 08:17   Cholesterol,Tot 100 - 199 mg/dL 105   Triglycerides 0 - 149 mg/dL 58   HDL >=40 mg/dL 37 !   LDL <100 mg/dL 56     He was initiated on statin by his previous provider. No known myalgias or side effects. Lipid panel as above is stable.    Current regimen: simvastatin 20 mg daily     Major Depressive Disorder With Single Episode, in Full Remission ([facility])    Depression Screening (Feb 2024)    Little interest or pleasure in doing things?  0 - not at all  Feeling down, depressed , or hopeless? 0 - not at all  Patient Health Questionnaire Score: 0      He has a history of depression for which he utilizes SSRI. Mood is doing well on the current regimen. No SI/HI. No side effects from the medication.     Current regimen: fluoxetine 20 mg daily             Patient Active Problem List    Diagnosis Date Noted    Mild cognitive impairment 11/15/2023    Fatty liver 01/29/2020    B12 deficiency 01/29/2020    Benign prostatic hyperplasia with urinary frequency 11/27/2018    Benign hemangioma -- Hepatic 10/16/2018    Controlled type 2 diabetes mellitus with complication, without long-term current use of insulin ([facility]) 02/14/2018    ([facility]) Hyperlipidemia  associated with type 2 diabetes mellitus 02/14/2018    Major depressive disorder with single episode, in full remission (HCC) 02/14/2018       Current Outpatient Medications   Medication Sig Dispense Refill    alfuzosin (UROXATRAL) 10 MG SR tablet Take 1 Tablet by mouth every day.      metFORMIN ER (GLUCOPHAGE XR) 500 MG TABLET SR 24 HR Take 2 Tablets by mouth 2 times a day. 400 Tablet 3    FLUoxetine (PROZAC) 20 MG Cap Take 1 Capsule by mouth every day. 100 Capsule 3    Dulaglutide 0.75 MG/0.5ML Solution Pen-injector Inject 0.5 mL under the skin every 7 days. 1.96 mL 3    simvastatin (ZOCOR) 20 MG Tab Take 1 Tablet by mouth every evening. 100 Tablet 3    terazosin (HYTRIN) 1 MG Cap Take 1 Capsule by mouth every evening. 100 Capsule 3    Cyanocobalamin (B-12) 100 MCG Tab Take 1 Tablet by mouth every day.      ASPIRIN 81 PO Take 1 Each by mouth every day at 6 PM.      cholecalciferol (VITAMIN D3) 400 UNIT Tab Take 400 Units by mouth every day.       No current facility-administered medications for this visit.          Current supplements as per medication list.     Allergies: Mushroom extract complex and Pcn [penicillins]    Current social contact/activities:      He  reports that he quit smoking about 52 years ago. His smoking use included cigarettes. He has never used smokeless tobacco. He reports that he does not drink alcohol and does not use drugs.  Counseling given: Not Answered  Tobacco comments: continued abstinence      ROS:    Gait: Uses no assistive device  Ostomy: No  Other tubes: No  Amputations: No  Chronic oxygen use: No  Last eye exam: 2 months ago   Wears hearing aids: No   : Denies any urinary leakage during the last 6 months    Screening:    Depression Screening  Little interest or pleasure in doing things?  0 - not at all  Feeling down, depressed , or hopeless? 0 - not at all  Patient Health Questionnaire Score: 0     If depressive symptoms identified deferred to follow up visit unless  specifically addressed in assessment and plan.    Interpretation of PHQ-9 Total Score   Score Severity   1-4 No Depression   5-9 Mild Depression   10-14 Moderate Depression   15-19 Moderately Severe Depression   20-27 Severe Depression    Screening for Cognitive Impairment  Do you or any of your friends or family members have any concern about your memory? No  Three Minute Recall (Banana, Sunrise, Chair) 3/3    Paulo clock face with all 12 numbers and set the hands to show 20 past 8.  Yes    Cognitive concerns identified deferred for follow up unless specifically addressed in assessment and plan.    Fall Risk Assessment  Has the patient had two or more falls in the last year or any fall with injury in the last year?  No    Safety Assessment  Do you always wear your seatbelt?  Yes  Any changes to home needed to function safely? No  Difficulty hearing.  No  Patient counseled about all safety risks that were identified.    Functional Assessment ADLs  Are there any barriers preventing you from cooking for yourself or meeting nutritional needs?  No.    Are there any barriers preventing you from driving safely or obtaining transportation?  No.    Are there any barriers preventing you from using a telephone or calling for help?  No    Are there any barriers preventing you from shopping?  No.    Are there any barriers preventing you from taking care of your own finances?  No    Are there any barriers preventing you from managing your medications?  No    Are there any barriers preventing you from showering, bathing or dressing yourself? No    Are there any barriers preventing you from doing housework or laundry? No  Are there any barriers preventing you from using the toilet?No  Are you currently engaging in any exercise or physical activity?  No.      Self-Assessment of Health  What is your perception of your health? Excellent  Do you sleep more than six hours a night? Yes  In the past 7 days, how much did pain keep you from  doing your normal work? None  Do you spend quality time with family or friends (virtually or in person)? Yes  Do you usually eat a heart healthy diet that constists of a variety of fruits, vegetables, whole grains and fiber? Yes  Do you eat foods high in fat and/or Fast Food more than three times per week? Yes    Advance Care Planning  Do you have an Advance Directive, Living Will, Durable Power of , or POLST? No                 Health Maintenance Summary            IMM DTaP/Tdap/Td Vaccine (2 - Td or Tdap) Next due on 4/2/2024 04/02/2014  Imm Admin: Tdap Vaccine    12/09/2004  Imm Admin: TD Vaccine    11/28/2000  Imm Admin: TD Vaccine    07/11/1991  Imm Admin: TD Vaccine              A1c Screening (Every 3 Months) Tentatively due on 5/20/2024 02/20/2024  HEMOGLOBIN A1C    11/15/2023  POCT Hemoglobin A1C    08/08/2023  POCT A1C    04/12/2023  POCT  A1C    12/27/2022  POCT  A1C    Only the first 5 history entries have been loaded, but more history exists.              Diabetes: Monofilament / LE Exam (Yearly) Tentatively due on 11/15/2024      11/15/2023  Diabetic Monofilament Lower Extremity Exam    09/19/2022  Diabetic Monofilament Lower Extremity Exam    08/06/2019  Diabetic Monofilament LE Exam    03/19/2018  Diabetic Monofilament Lower Extremity Exam              Diabetes: Retinopathy Screening (Yearly) Next due on 12/4/2024 12/04/2023  AMB EXTERNAL RETINAL SCREENING RESULTS    11/07/2022  RETINAL SCREENING RESULTS    09/09/2021  REFERRAL FOR RETINAL SCREENING EXAM    09/09/2021  AMB EXTERNAL RETINAL SCREENING RESULTS    02/26/2020  REFERRAL FOR RETINAL SCREENING EXAM    Only the first 5 history entries have been loaded, but more history exists.              Fasting Lipid Profile (Yearly) Tentatively due on 2/20/2025 02/20/2024  Lipid Profile    06/27/2023  Lipid Profile    12/21/2022  Lipid Profile    06/10/2021  LIPID PANEL    01/31/2020  Lipid Profile    Only the first 5 history  entries have been loaded, but more history exists.              Diabetes: Urine Protein Screening (Yearly) Next due on 2/20/2025 02/20/2024  MICROALBUMIN CREAT RATIO URINE    06/27/2023  MICROALBUMIN CREAT RATIO URINE    12/21/2022  MICROALBUMIN CREAT RATIO URINE    06/10/2021  MICROALB/CREAT RATIO RAND. UR    01/31/2020  MICROALBUMIN CREAT RATIO URINE    Only the first 5 history entries have been loaded, but more history exists.              SERUM CREATININE (Yearly) Next due on 2/20/2025 02/20/2024  Comp Metabolic Panel    06/27/2023  Comp Metabolic Panel    12/21/2022  Comp Metabolic Panel    06/10/2021  COMP METABOLIC PANEL    09/25/2020  Comp Metabolic Panel    Only the first 5 history entries have been loaded, but more history exists.              Annual Wellness Visit (Yearly) Next due on 2/21/2025 02/21/2024  Level of Service: ANNUAL WELLNESS VISIT-INCLUDES PPPS SUBSEQUE*    09/19/2022  Level of Service: SC ANNUAL WELLNESS VISIT-INCLUDES PPPS SUBSEQUE*              Hepatitis A Vaccine (Hep A) (Series Information) Aged Out      05/30/2007  Imm Admin: Hep A/HEP B Combined Vaccine ([facility]win[facility]ix)    01/06/2005  Imm Admin: Hep A/HEP B Combined Vaccine ([facility]ix)    12/09/2004  Imm Admin: Hep A/HEP B Combined Vaccine ([facility]Rix)    11/28/2000  Imm Admin: Hepatitis A Vaccine, Ped/Adol              Hepatitis B Vaccine (Hep B) (Series Information) Completed      05/30/2007  Imm Admin: Hep A/HEP B Combined Vaccine ([facility]Rix)    01/06/2005  Imm Admin: Hep A/HEP B Combined Vaccine ([facility]ix)    12/09/2004  Imm Admin: Hep A/HEP B Combined Vaccine ([facility]Rix)    11/28/2000  Imm Admin: Hepatitis B Vaccine Adolescent/Pediatric              Zoster (Shingles) Vaccines (Series Information) Completed      10/18/2021  Imm Admin: Zoster Vaccine Recombinant (RZV) ([facility]INGRIX)    05/24/2021  Imm Admin: Zoster Vaccine Recombinant (RZV) (SHINGRIX)              Pneumococcal Vaccine: 65+ Years (Series Information) Completed       12/13/2022  Imm Admin: Pneumococcal Conjugate Vaccine (PCV20)    10/01/2016  Imm Admin: Pneumococcal polysaccharide vaccine (PPSV-23)    05/20/2015  Imm Admin: Pneumococcal Conjugate Vaccine (Prevnar/PCV-13)              Influenza Vaccine (Series Information) Completed      10/04/2023  Imm Admin: Influenza Vaccine Adult HD    09/19/2022  Imm Admin: Influenza Vaccine Adult HD    11/09/2021  Imm Admin: Influenza Vaccine Adult HD    10/08/2020  Imm Admin: Influenza Vaccine Adult HD    11/01/2019  Imm Admin: Influenza Vaccine Adult HD    Only the first 5 history entries have been loaded, but more history exists.              COVID-19 Vaccine (Series Information) Completed      10/04/2023  Imm Admin: Covid-19 Mrna (Spikevax) Moderna 12+ Years    12/13/2022  Imm Admin: PFIZER BIVALENT SARS-COV-2 VACCINE (12+)    07/01/2022  Imm Admin: PFIZER SIMS CAP SARS-COV-2 VACCINATION (12+)    09/27/2021  Imm Admin: PFIZER PURPLE CAP SARS-COV-2 VACCINATION (12+)    02/19/2021  Imm Admin: PFIZER PURPLE CAP SARS-COV-2 VACCINATION (12+)    Only the first 5 history entries have been loaded, but more history exists.              Hepatitis C Screening  Tentatively Complete      02/20/2024  Hepatitis C Antibody component of HEP C VIRUS ANTIBODY              HPV Vaccines (Series Information) Aged Out      No completion history exists for this topic.              Meningococcal Immunization (Series Information) Aged Out      No completion history exists for this topic.              Discontinued - Colorectal Cancer Screening  Discontinued        Frequency changed to Never automatically (Topic No Longer Applies)    03/07/2016  REFERRAL TO GI FOR COLONOSCOPY              Discontinued - Polio Vaccine (Inactivated Polio)  Discontinued      08/05/1991  Imm Admin: IPV                    Patient Care Team:  Melissa Miller D.O. as PCP - General (Internal Medicine)        Social History     Tobacco Use    Smoking status: Former     Current  "packs/day: 0.00     Types: Cigarettes     Quit date: 1971     Years since quittin.1    Smokeless tobacco: Never    Tobacco comments:     continued abstinence   Vaping Use    Vaping Use: Never used   Substance Use Topics    Alcohol use: No    Drug use: No     Family History   Problem Relation Age of Onset    Heart Disease Mother     Heart Disease Father     Diabetes Brother     Kidney Disease Brother     Diabetes Sister      He  has a past medical history of B12 deficiency (2020), Diabetes (HCC), Diarrhea (2020), Hyperlipidemia, Hypertension, and Vitamin D deficiency (2020).    He has no past medical history of Encounter for long-term (current) use of other medications.   Past Surgical History:   Procedure Laterality Date    OTHER ABDOMINAL SURGERY      Bowel surgery d/t intusussception (doesn't know LI vs. SI, no GI complaints now)       Exam:   BP 94/60 (BP Location: Left arm, Patient Position: Sitting, BP Cuff Size: Adult)   Pulse 84   Temp 36.7 °C (98.1 °F) (Temporal)   Resp 16   Ht 1.778 m (5' 10\")   Wt 74.8 kg (164 lb 14.4 oz)   SpO2 97%  Body mass index is 23.66 kg/m².    Hearing fair.    Dentition good  Alert, oriented in no acute distress.  Eye contact is good, speech goal directed, affect calm  Physical Exam  Constitutional:       General: He is not in acute distress.     Appearance: Normal appearance. He is not ill-appearing.   HENT:      Right Ear: Ear canal and external ear normal. There is no impacted cerumen.      Left Ear: Ear canal and external ear normal. There is no impacted cerumen.   Eyes:      General: No scleral icterus.     Conjunctiva/sclera: Conjunctivae normal.   Cardiovascular:      Rate and Rhythm: Normal rate and regular rhythm.      Pulses: Normal pulses.   Pulmonary:      Effort: Pulmonary effort is normal. No respiratory distress.      Breath sounds: Normal breath sounds. No wheezing or rhonchi.   Abdominal:      General: Bowel sounds are normal.      " Palpations: Abdomen is soft.   Musculoskeletal:      Right lower leg: No edema.      Left lower leg: No edema.   Skin:     General: Skin is warm and dry.      Findings: No rash.   Neurological:      Gait: Gait normal.   Psychiatric:         Mood and Affect: Mood normal.         Behavior: Behavior normal.         Thought Content: Thought content normal.         Judgment: Judgment normal.       Assessment and Plan. The following treatment and monitoring plan is recommended:    Problem List Items Addressed This Visit       ([facility]) Hyperlipidemia associated with type 2 diabetes mellitus     Chronic stable problem, lipids are at goal on current medical therapy, continue simvastatin 20 mg daily.  Next lipid panel due in 1 year.         Relevant Medications    metFORMIN ER (GLUCOPHAGE XR) 500 MG TABLET SR 24 HR    Controlled type 2 diabetes mellitus with complication, without long-term current use of insulin ([facility])     Chronic improved problem, A1c at goal at 6.7, continue metformin at 1000 mg twice daily Trulicity 0.7 mg weekly.  Tolerating without issue at this time.  LDL is at goal.  No microalbuminuria.  Retinal screening is up-to-date.  Recheck again in 3 months.         Relevant Medications    metFORMIN ER (GLUCOPHAGE XR) 500 MG TABLET SR 24 HR    Major depressive disorder with single episode, in full remission ([facility])     Chronic stable problem, mood is well on current medical therapy, continue fluoxetine 20 mg daily.  Good eye contact and laughing spontaneously during interview.         Mild cognitive impairment     Recent issue, did better on mini cog today, meet with Dr. Jama in 5 to 6 weeks to establish care and determine if any additional evaluation is needed at this time.          Other Visit Diagnoses       Encounter for subsequent annual wellness visit (AWV) in Medicare patient    -  Primary            Services suggested: No services needed at this time  Health Care Screening: Age-appropriate preventive services  recommended by USPTF and ACIP covered by Medicare were discussed today. Services ordered if indicated and agreed upon by the patient.  Referrals offered: Community-based lifestyle interventions to reduce health risks and promote self-management and wellness, fall prevention, nutrition, physical activity, tobacco-use cessation, weight loss, and mental health services as per orders if indicated.    Discussion today about general wellness and lifestyle habits:    Prevent falls and reduce trip hazards; Cautioned about securing or removing rugs.  Have a working fire alarm and carbon monoxide detector;   Engage in regular physical activity and social activities     Follow-up: Return in about 3 months (around 5/21/2024).    I spent a total of 22 minutes with record review, exam, communication with the patient, communication with other providers, and documentation of this encounter.      Melissa Miller,   Geriatric and Internal Medicine  [facility]

## 2024-04-03 ENCOUNTER — OFFICE VISIT (OUTPATIENT)
Dept: NEUROLOGY | Facility: MEDICAL CENTER | Age: 79
End: 2024-04-03
Attending: PSYCHIATRY & NEUROLOGY
Payer: MEDICARE

## 2024-04-03 VITALS
HEART RATE: 66 BPM | HEIGHT: 70 IN | WEIGHT: 164.9 LBS | SYSTOLIC BLOOD PRESSURE: 98 MMHG | BODY MASS INDEX: 23.61 KG/M2 | TEMPERATURE: 96.5 F | DIASTOLIC BLOOD PRESSURE: 58 MMHG | OXYGEN SATURATION: 97 %

## 2024-04-03 DIAGNOSIS — G31.84 MILD COGNITIVE IMPAIRMENT: ICD-10-CM

## 2024-04-03 DIAGNOSIS — R41.3 MEMORY CHANGES: ICD-10-CM

## 2024-04-03 PROCEDURE — 99212 OFFICE O/P EST SF 10 MIN: CPT | Performed by: PSYCHIATRY & NEUROLOGY

## 2024-04-03 PROCEDURE — 3078F DIAST BP <80 MM HG: CPT | Performed by: PSYCHIATRY & NEUROLOGY

## 2024-04-03 PROCEDURE — 99204 OFFICE O/P NEW MOD 45 MIN: CPT | Performed by: PSYCHIATRY & NEUROLOGY

## 2024-04-03 PROCEDURE — 3074F SYST BP LT 130 MM HG: CPT | Performed by: PSYCHIATRY & NEUROLOGY

## 2024-04-03 ASSESSMENT — FIBROSIS 4 INDEX: FIB4 SCORE: 0.85

## 2024-04-03 NOTE — PROGRESS NOTES
"Reason for Neurology Consult: memory disturbances for the last 2 years or so.    History of present illness:    Fara Chavira 78 y.o. 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  He received his PhD at the [employer] > Metallurgy area.  He teaches 1 class per semester presently -  \"Structural Materials\"    He is here with his wife.    Problem List:  Sensory Neural Hearing loss.  Type 2 Diabetes- since 25 years.    He is aware regarding his memory and thinking in that places he would occasionally having difficulty getting to his dentist.    Wife noticed about 2-3 years ago that Fara would ask a question and then would ask it again a few hours later and this has been more noticeable since he retired.  On average he may ask the same question over again once usually 3 times a week.    He has not noticed any problems giving his lectures or preparing them.    Communication ability- no clear progression of the ability to understand spoken.    Misplacing personal items- occurring 2 times a week> family member sometimes has to assist him to find say his phone ( once every other week).    He has not noticed any problems with his math or calculations in the recent months.    No paranoia,delusions or hallucinations in the recent 6 months or so.    Personality/Behavioral has not changed per wife in the last few years.    No evolving gait-balance issues noticed by Fara or wife in the last 2 years.    No falls or near falls in the last 6 months or so.    Sleep- 8-9 hours most nights with restful sleep in the recent months. No stigmata of REM Sleep Behavioral symptoms. Minor snoring but no grunting,gasping or apneic events noticed by wife in the recent months. He feels quite refreshed most mornings in the recent months.    Non smoker nor any significant alcohol intake in adult life.    No family history of memory/cognitive/dementia issues " known.  Parents-  in their 60s (Coronary Artery Disease)            Patient Active Problem List    Diagnosis Date Noted    Mild cognitive impairment 11/15/2023    Fatty liver 2020    B12 deficiency 2020    Benign prostatic hyperplasia with urinary frequency 2018    Benign hemangioma -- Hepatic 10/16/2018    Controlled type 2 diabetes mellitus with complication, without long-term current use of insulin ([facility]) 2018    ([facility]) Hyperlipidemia associated with type 2 diabetes mellitus 2018    Major depressive disorder with single episode, in full remission ([facility]) 2018       Past medical history:   Past Medical History:   Diagnosis Date    B12 deficiency 2020      Ref. Range 2020 09:14 Vitamin B12 -True Cobalamin Latest Ref Range: 211 - 911 pg/mL >1500 (H)   He has a history of B12 deficiency. He is currently taking 3000 mcg of B12 daily. No neuropathy or gait instability at this time.     Diabetes ([facility])     Diarrhea 2020    He reports approximately 1 month of intermittent diarrhea starting in 2019. Nonbloody. Sometimes malodorous. Has not noticed if stool sinks or floats. No nocturnal awakenings for diarrhea. Has been using imodium as needed. No known history of C diff. No known thyroid disease. No prior issues with his metformin. He does report eating spicy foods.   He reports diarrhea has subsided since he    Hyperlipidemia     Hypertension     Vitamin D deficiency 2020      Ref. Range 2020 09:14 25-Hydroxy   Vitamin D 25 Latest Ref Range: 30 - 100 ng/mL 37   He has a history of vitamin D deficiency. No history of osteopenia or osteoporosis. He is taking Vitamin D 400 IU daily.       Past surgical history:   Past Surgical History:   Procedure Laterality Date    OTHER ABDOMINAL SURGERY      Bowel surgery d/t intusussception (doesn't know LI vs. SI, no GI complaints now)         Social history:   Social History     Socioeconomic History    Marital  status:      Spouse name: Not on file    Number of children: Not on file    Years of education: Not on file    Highest education level: Doctorate   Occupational History    Not on file   Tobacco Use    Smoking status: Former     Current packs/day: 0.00     Types: Cigarettes     Quit date: 1971     Years since quittin.3    Smokeless tobacco: Never    Tobacco comments:     continued abstinence   Vaping Use    Vaping Use: Never used   Substance and Sexual Activity    Alcohol use: No     Comment: occ    Drug use: No    Sexual activity: Yes     Partners: Female     Birth control/protection: Post-Menopausal   Other Topics Concern    Not on file   Social History Narrative    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 He has been  to Mikayla for over 40 years.     Social Determinants of Health     Financial Resource Strain: Low Risk  (2022)    Overall Financial Resource Strain (CARDIA)     Difficulty of Paying Living Expenses: Not hard at all   Food Insecurity: No Food Insecurity (2022)    Hunger Vital Sign     Worried About Running Out of Food in the Last Year: Never true     Ran Out of Food in the Last Year: Never true   Transportation Needs: No Transportation Needs (2022)    PRAPARE - Transportation     Lack of Transportation (Medical): No     Lack of Transportation (Non-Medical): No   Physical Activity: Inactive (2022)    Exercise Vital Sign     Days of Exercise per Week: 0 days     Minutes of Exercise per Session: 0 min   Stress: No Stress Concern Present (2022)    [place] [employer] - Occupational Stress Questionnaire     Feeling of Stress : Only a little   Social Connections: Unknown (2022)    Social Connection and Isolation Panel [NHANES]     Frequency of Communication with Friends and Family: Twice a week     Frequency of Social Gatherings with Friends and Family: Patient declined     Attends Zoroastrian Services: Never     Active Member of  "Clubs or Organizations: No     Attends Club or Organization Meetings: Never     Marital Status:    Intimate Partner Violence: Not on file   Housing Stability: Low Risk  (2/28/2022)    Housing Stability Vital Sign     Unable to Pay for Housing in the Last Year: No     Number of Places Lived in the Last Year: 1     Unstable Housing in the Last Year: No       Family history:   Family History   Problem Relation Age of Onset    Heart Disease Mother     Heart Disease Father     Diabetes Brother     Kidney Disease Brother     Diabetes Sister          Current medications:   Current Outpatient Medications   Medication    alfuzosin (UROXATRAL) 10 MG SR tablet    metFORMIN ER (GLUCOPHAGE XR) 500 MG TABLET SR 24 HR    FLUoxetine (PROZAC) 20 MG Cap    Dulaglutide 0.75 MG/0.5ML Solution Pen-injector    simvastatin (ZOCOR) 20 MG Tab    terazosin (HYTRIN) 1 MG Cap    Cyanocobalamin (B-12) 100 MCG Tab    ASPIRIN 81 PO    cholecalciferol (VITAMIN D3) 400 UNIT Tab     No current facility-administered medications for this visit.       Medication Allergy:  Allergies   Allergen Reactions    Mushroom Extract Complex Vomiting    Pcn [Penicillins] Rash     On chest           Physical examination:   Vitals:    04/03/24 1111   BP: 98/58   BP Location: Right arm   Patient Position: Sitting   BP Cuff Size: Adult   Pulse: 66   Temp: 35.8 °C (96.5 °F)   TempSrc: Temporal   SpO2: 97%   Weight: 74.8 kg (164 lb 14.5 oz)   Height: 1.778 m (5' 10\")       Normal cephalic atraumatic.  There is full range of movement around the neck in all directions without restrictions or discrete pain evoked triggers.  No lower extremity edema.      Neurological  Exam:      Edgar Cognitive Assessment (MOCA) Version 7.1    Years of Education: PhD - Material Science     TOTAL SCORE: 24/30  (to be scanned into the MEDIA section in the E.M.R.)          Mental status: Awake, alert and fully oriented to person, place, time, and situation. Normal attention " and concentration.  Did not appear/act combative,irritable,anxious,paranoid/delusional or aggressive to or with me.    Speech and language: Speech is fluent without errors, clear, intact to repetition, and intact to naming.     Follows 3 step motor commands in sequence without significant delay and correctly.    Cranial nerve exam:  II: Pupils are equally round and reactive to light. Visual fields are intact by confrontation.  III, IV, VI: EOMI, no diplopia, no ptosis.  V: Sensation to light touch is normal over V1-3 distributions bilaterally.  .  VII: Facial movements are symmetrical. There is no facial droop. .  VIII: Hearing intact to soft speech and finger rub bilaterally  IX: Palate elevates symmetrically, uvula is midline. Dysarthria is not present.  XI: Shoulder shrug are symmetrical and strong.   XII: Tongue protrudes midline.      Motor exam:  Muscle tone is normal in all 4 limbs. and No abnormal movements appreciated.    Muscle strength:    Neck Flexors/Extensors: 5/5       Right  Left  Deltoid   5/5  5/5      Biceps   5/5  5/5  Triceps              5/5  5/5   Wrist extensors 5/5  5/5  Wrist flexors  5/5  5/5     5/5  5/5  Interossei  5/5  5/5  Thenar (APB)  5/5  5/5   Hip flexors  5/5  5/5  Quadriceps  5/5  5/5    Hamstrings  5/5  5/5  Dorsiflexors  5/5  5/5  Plantarflexors  5/5  5/5  Toe extension  5/5  5/5    Reflexes:       Right  Left  Biceps   2/4  2/4  Triceps              2/4  2/4  Brachioradialis 2/4  2/4  Knee jerk  2/4  2/4  Ankle jerk  2/4  2/4     Frontal release signs are absent    bilaterally toes are downgoing to plantar stimulation..    Coordination (finger-to-nose, heel/knee/shin, rapid alternating movements) was normal.     There was no ataxia, no tremors, and no dysmetria.     Station and gait >>    Easily stands up from exam chair without retropulsion,veering,leaning,swaying (to either side).       Labs and Tests:    0 Result Notes         Component  Ref Range & Units 1 mo ago 9 mo  ago 1 yr ago 4 yr ago   25-Hydroxy   Vitamin D 25  30 - 100 ng/mL 56 77 CM 76 CM 37 CM   Comment: Adult Ranges:   <20 ng/mL - Deficiency  20-29 ng/mL - Insufficiency   ng/mL - Sufficiency  Electrochemiluminescence binding assay performed using Roche navin e  immunoassay analyzer.  The Elecsys Vitamin D total II assay is intended for  the quantitative determination of total 25 hydroxyvitamin D in human serum  and plasma. This assay is to be used as an aid in the assessment of vitamin  D sufficiency in adults.          TSH  Order: [id number]   Status: Final result       Visible to patient: Yes (seen)       Next appt: 05/21/2024 at 03:00 PM in [facility] (Melissa Miller D.O.)       Dx: (HCC) Hyperlipidemia associated with ...    0 Result Notes         Component  Ref Range & Units 1 mo ago 1 yr ago 4 yr ago 6 yr ago   TSH  0.380 - 5.330 uIU/mL 3.090 2.490 CM 1.640 CM 2.830 R   Comment: The 2011 American Thyroid Association (STEPHENIE) guidelines  recommended that the interpretation of thyroid function in  pregnancy be based on trimester specific reference ranges.    1st Trimester  0.100-2.500 mIU/L  2nd Trimester  0.200-3.000 mIU/L  3rd Trimester  0.300-3.500 mIU/L    These established reference ranges have not been validated  [facility].          VITAMIN B12  Order: [id number]   Status: Final result       Visible to patient: Yes (seen)       Next appt: 05/21/2024 at 03:00 PM in [facility] Group ([email or web address] [email or web address])       Dx: (HCC) Hyperlipidemia associated with ...    0 Result Notes         Component  Ref Range & Units 1 mo ago 9 mo ago 1 yr ago 4 yr ago   Vitamin B12 -True Cobalamin  211 - 911 pg/mL 646 942 High  274 >1500 High    Resulting Agency M M M M              Narrative  Performed by: M  Request patient fasting?->Yes  Fasting Instructions:->Fast 8-10 hours, OK to drink water as  needed during fast, take medications per your provider's  instruction.      Specimen Collected:  02/20/24  8:17 AM             0 Result Notes       1 HM Topic            Component  Ref Range & Units 1 mo ago 9 mo ago 1 yr ago 2 yr ago 4 yr ago 5 yr ago 6 yr ago   Cholesterol,Tot  100 - 199 mg/dL 105 103 140 104 114 113 159   Triglycerides  0 - 149 mg/dL 58 89 134 106 85 140 142   HDL  >=40 mg/dL 37 Abnormal  34 Abnormal  35 Abnormal  38 Low  R 40 35 Abnormal  36 Low  R   LDL  <100 mg/dL 56 51 78  57 50 95 R   Resulting Agency M M M [facility] M M [facility]         Comp Metabolic Panel  Order: [id number]   Status: Final result       Visible to patient: Yes (seen)       Next appt: 05/21/2024 at 03:00 PM in [facility] (Melissa Miller D.O.)       Dx: (HCC) Hyperlipidemia associated with ...    0 Result Notes       1 HM Topic            Component  Ref Range & Units 1 mo ago  (2/20/24) 9 mo ago  (6/27/23) 9 mo ago  (6/27/23) 1 yr ago  (12/21/22) 1 yr ago  (12/21/22) 2 yr ago  (6/10/21) 3 yr ago  (9/25/20)   Sodium  135 - 145 mmol/L 139  138  138 139 R 135 R   Potassium  3.6 - 5.5 mmol/L 4.4  4.6  4.7 4.5 R 4.1 R   Chloride  96 - 112 mmol/L 101  101  102 103 R 101 R   Co2  20 - 33 mmol/L 23  24  25 23 R 22 R   Anion Gap  7.0 - 16.0 15.0  13.0  11.0     Glucose  65 - 99 mg/dL 117 High   106 High   152 High  119 High  108 High    Bun  8 - 22 mg/dL 13  11  14 15 R 14 R   Creatinine  0.50 - 1.40 mg/dL 0.86  0.84  0.85 0.73 Low  R 0.86 R   Calcium  8.5 - 10.5 mg/dL 9.3  9.5  9.5 9.1 R 8.9 R   Correct Calcium  8.5 - 10.5 mg/dL 9.2 9.3  9.3      AST(SGOT)  12 - 45 U/L 15  12  20 15 R 23 R   ALT(SGPT)  2 - 50 U/L 21  14  30 19 R 28 R   Alkaline Phosphatase  30 - 99 U/L 55  56  63 56 R 62 R   Total Bilirubin  0.1 - 1.5 mg/dL 0.8  1.0  0.8 1.1 R 0.8 R   Albumin  3.2 - 4.9 g/dL 4.1  4.2  4.3 4.2 R 4.2 R   Total Protein  6.0 - 8.2 g/dL 6.8  6.6  7.4 6.6 R 6.3 R   Globulin  1.9 - 3.5 g/dL 2.7  2.4  3.1 2.4 R 2.1 R   A-G Ratio  g/dL 1.5  1.8  1.4 1.8 R 2.0 R   Resulting Agency M M M              NEUROIMAGING:          Impression/Plans/Recommendations:    Mild Cognitive Impairment with memory disturbances for 1-2 years:    There are no features of parkinsonism or Lewy Body Disease at this time.    MOCA score today:24/30 today with 3 out of 5 on delayed word recall.    Alzheimer's Questionnaire Score today per wife.    IADL Survey per wife (see Media Section)- all 1s    Plans:    A Brain MRI to be done.    B. Neuropsychological Assessment referral with Dr. Srinivasan hunter    C. Blood work to be done (Vitamin B1 and B9 levels)    D. Brain Health factors reviewed today.      I have performed  a history and physical exam and a directed /focused  ROS today.    Total time spent today or this patient's care was 54 minutes  and included reviewing  the diagnostic workup to date (such as labs and imaging as well as interpreting such tests relevant to this patient's neurological condition),  reviewing/obtaining separately obtained history (from patient and/or accompanying ffamily member)  for today's neurological problem(s) ,counseling and educating the patient and family member on issues related to cognition/memory and cognitive health factors and documenting  the clinical information in the EMR.    Follow up in 8-9 months or so        Stan Jama MD  [place] [facility].   [facility]

## 2024-04-05 ENCOUNTER — HOSPITAL ENCOUNTER (OUTPATIENT)
Dept: LAB | Facility: MEDICAL CENTER | Age: 79
End: 2024-04-05
Attending: PSYCHIATRY & NEUROLOGY
Payer: MEDICARE

## 2024-04-05 DIAGNOSIS — G31.84 MILD COGNITIVE IMPAIRMENT: ICD-10-CM

## 2024-04-05 PROCEDURE — 36415 COLL VENOUS BLD VENIPUNCTURE: CPT

## 2024-04-05 PROCEDURE — 82746 ASSAY OF FOLIC ACID SERUM: CPT

## 2024-04-06 LAB — FOLATE SERPL-MCNC: 9.2 NG/ML

## 2024-05-15 DIAGNOSIS — E11.8 CONTROLLED TYPE 2 DIABETES MELLITUS WITH COMPLICATION, WITHOUT LONG-TERM CURRENT USE OF INSULIN (HCC): ICD-10-CM

## 2024-05-15 RX ORDER — DULAGLUTIDE 0.75 MG/.5ML
INJECTION, SOLUTION SUBCUTANEOUS
Qty: 6 ML | Refills: 3 | Status: SHIPPED | OUTPATIENT
Start: 2024-05-15

## 2024-05-15 NOTE — TELEPHONE ENCOUNTER
Received request via: Pharmacy    Was the patient seen in the last year in this department? Yes    Does the patient have an active prescription (recently filled or refills available) for medication(s) requested? No    Pharmacy Name: [facility]    Does the patient have snf Plus and need 100 day supply (blood pressure, diabetes and cholesterol meds only)? Yes, quantity updated to 100 days

## 2024-05-21 ENCOUNTER — OFFICE VISIT (OUTPATIENT)
Dept: MEDICAL GROUP | Facility: PHYSICIAN GROUP | Age: 79
End: 2024-05-21
Payer: MEDICARE

## 2024-05-21 VITALS
OXYGEN SATURATION: 97 % | HEART RATE: 77 BPM | SYSTOLIC BLOOD PRESSURE: 100 MMHG | HEIGHT: 70 IN | TEMPERATURE: 96.5 F | DIASTOLIC BLOOD PRESSURE: 62 MMHG | WEIGHT: 164.9 LBS | RESPIRATION RATE: 16 BRPM | BODY MASS INDEX: 23.61 KG/M2

## 2024-05-21 DIAGNOSIS — E11.69 HYPERLIPIDEMIA ASSOCIATED WITH TYPE 2 DIABETES MELLITUS (HCC): ICD-10-CM

## 2024-05-21 DIAGNOSIS — E78.5 HYPERLIPIDEMIA ASSOCIATED WITH TYPE 2 DIABETES MELLITUS (HCC): ICD-10-CM

## 2024-05-21 DIAGNOSIS — R35.0 BENIGN PROSTATIC HYPERPLASIA WITH URINARY FREQUENCY: ICD-10-CM

## 2024-05-21 DIAGNOSIS — N40.1 BENIGN PROSTATIC HYPERPLASIA WITH URINARY FREQUENCY: ICD-10-CM

## 2024-05-21 DIAGNOSIS — F32.5 MAJOR DEPRESSIVE DISORDER WITH SINGLE EPISODE, IN FULL REMISSION (HCC): ICD-10-CM

## 2024-05-21 DIAGNOSIS — E11.8 CONTROLLED TYPE 2 DIABETES MELLITUS WITH COMPLICATION, WITHOUT LONG-TERM CURRENT USE OF INSULIN (HCC): ICD-10-CM

## 2024-05-21 DIAGNOSIS — E53.8 B12 DEFICIENCY: ICD-10-CM

## 2024-05-21 DIAGNOSIS — G31.84 MILD COGNITIVE IMPAIRMENT: ICD-10-CM

## 2024-05-21 LAB
HBA1C MFR BLD: 6.9 % (ref ?–5.8)
POCT INT CON NEG: NEGATIVE
POCT INT CON POS: POSITIVE

## 2024-05-21 PROCEDURE — 3078F DIAST BP <80 MM HG: CPT | Performed by: INTERNAL MEDICINE

## 2024-05-21 PROCEDURE — 3074F SYST BP LT 130 MM HG: CPT | Performed by: INTERNAL MEDICINE

## 2024-05-21 PROCEDURE — G2211 COMPLEX E/M VISIT ADD ON: HCPCS | Performed by: INTERNAL MEDICINE

## 2024-05-21 PROCEDURE — 99214 OFFICE O/P EST MOD 30 MIN: CPT | Performed by: INTERNAL MEDICINE

## 2024-05-21 RX ORDER — IBUPROFEN 800 MG/1
800 TABLET ORAL EVERY 8 HOURS PRN
COMMUNITY
Start: 2024-04-12

## 2024-05-21 RX ORDER — CLINDAMYCIN HYDROCHLORIDE 300 MG/1
300 CAPSULE ORAL 4 TIMES DAILY
COMMUNITY
Start: 2024-04-11 | End: 2024-05-21

## 2024-05-21 ASSESSMENT — FIBROSIS 4 INDEX: FIB4 SCORE: 0.85

## 2024-05-21 NOTE — ASSESSMENT & PLAN NOTE
Chronic ongoing problem, continues to display symptoms consistent with mild cognitive impairment, he will schedule follow-up brain MRI testing which neurology recommends. He has an appointment scheduled with Dr. Mattson for neuropsychological testing in November 2024 with follow-up the following month with Dr. Jama.

## 2024-05-21 NOTE — ASSESSMENT & PLAN NOTE
Chronic ongoing problem, continues to do well on alfuzosin 10 mg daily, was trialed on a different medicine when he thought his insurance would no longer cover it and this caused decompensation of symptoms.  Continue follow-up with urology as recommended.

## 2024-05-21 NOTE — PROGRESS NOTES
Subjective:   Chief Complaint/History of Present Illness:  Fara Chavira is a 78 y.o. male established patient who presents today to discuss medical problems as listed below. Fara is accompanied by his wife for today's visit.    Problem   Mild Cognitive Impairment    MoCA 20/30 in November 2023 with me, scored 24/30 with Dr. Jama in March 2024    His wife and children had noticed that he was more forgetful in recent time often repeating stories or asking them to repeat details of things had already discussed with him.  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  Worsened navigational skills.  Some trouble with word finding.  Reports he speaks 4 languages but English has been his primary language for years but [place] is his mother tongue. Saw neurology and recommended to complete brain MRI and undergo neuropsychological testing with Dr. Mattson.     B12 Deficiency     Latest Reference Range & Units 02/20/24 08:17   Vitamin B12 -True Cobalamin 211 - 911 pg/mL 646     He has a history of B12 deficiency down to 270's in 2021. No neuropathy or gait instability at this time. Previously taking 3,000 mcg daily with elevated levels, recommend he take 100 mcg daily instead and levels are doing better.     Benign Prostatic Hyperplasia With Urinary Frequency    He reports longstanding history of BPH.  Flomax was ineffective so he was transition onto alfuzosin.  In early 2024 he was notified that his new insurance drug coverage would not pay for it and was transition onto an alternate medicine which was not effective.  Followed up with urology and rechecked and the alfuzosin is in fact on the formulary so he is not back to taking that medicine.      Current regimen: Alfuzosin 10 mg daily     Controlled Type 2 Diabetes Mellitus With Complication, Without Long-Term Current Use of Insulin (Hcc)     Latest Reference Range & Units 11/15/23 16:19 02/20/24 08:17 05/21/24 14:54   Glycohemoglobin 5.8 % 8.5 ! 6.7 (H) 6.9 !  "    Patient and his wife think his DM2 was diagnosed around [id number]. He was on metformin and glipizide for several years with stable blood sugars and ARB was added eventually for \"kidney disease\" per patient. Initially, his providers were only checking A1c once a year. He denies history of retinopathy or neuropathy. Agreeable to trying Rybelsus, led to some side effects so now on dulaglutide 0.75 mg weekly with good results. Stopped glipizide in 4/2023.    ARB discontinued due to hypotension and concerns for orthostasis.    No nephropathy or kidney disease on lab testing. No known retinopathy, follows with Dr. Jean Baptiste.      Current regimen: metformin 1000 mg BID and Trulicity 0.75 mg every 7 days  His regimen: Rybelsus 14 mg daily (side effects), glipizide     (HCC) Hyperlipidemia associated with type 2 diabetes mellitus     Latest Reference Range & Units 02/20/24 08:17   Cholesterol,Tot 100 - 199 mg/dL 105   Triglycerides 0 - 149 mg/dL 58   HDL >=40 mg/dL 37 !   LDL <100 mg/dL 56     He was initiated on statin by his previous provider. No known myalgias or side effects. Lipid panel as above is stable.    Current regimen: simvastatin 20 mg daily     Major Depressive Disorder With Single Episode, in Full Remission (Hcc)    He has a history of depression for which he utilizes SSRI. Mood is doing well on the current regimen. No SI/HI. No side effects from the medication.     Current regimen: fluoxetine 20 mg daily            Current Medications:  Current Outpatient Medications Ordered in Epic   Medication Sig Dispense Refill    ibuprofen (MOTRIN) 800 MG Tab Take 800 mg by mouth every 8 hours as needed for Inflammation or Moderate Pain.      Dulaglutide (TRULICITY) 0.75 MG/0.5ML Solution Pen-injector INJECT 0.5ML UNDER THE SKIN ONCE EVERY 7 DAYS 6 mL 3    alfuzosin (UROXATRAL) 10 MG SR tablet Take 1 Tablet by mouth every day.      metFORMIN ER (GLUCOPHAGE XR) 500 MG TABLET SR 24 HR Take 2 Tablets by mouth 2 times a " "day. 400 Tablet 3    FLUoxetine (PROZAC) 20 MG Cap Take 1 Capsule by mouth every day. 100 Capsule 3    simvastatin (ZOCOR) 20 MG Tab Take 1 Tablet by mouth every evening. 100 Tablet 3    Cyanocobalamin (B-12) 100 MCG Tab Take 1 Tablet by mouth every day.      ASPIRIN 81 PO Take 1 Each by mouth every day at 6 PM.      cholecalciferol (VITAMIN D3) 400 UNIT Tab Take 400 Units by mouth every day.       No current [facility]-ordered facility-administered medications on file.          Objective:   Physical Exam:    Vitals: /62 (BP Location: Right arm, Patient Position: Sitting, BP Cuff Size: Adult)   Pulse 77   Temp 35.8 °C (96.5 °F) (Temporal)   Resp 16   Ht 1.778 m (5' 10\")   Wt 74.8 kg (164 lb 14.4 oz)   SpO2 97%    BMI: Body mass index is 23.66 kg/m².  Physical Exam  Constitutional:       General: He is not in acute distress.     Appearance: Normal appearance. He is not ill-appearing.   HENT:      Right Ear: External ear normal.      Left Ear: External ear normal.   Eyes:      General: No scleral icterus.     Conjunctiva/sclera: Conjunctivae normal.   Cardiovascular:      Rate and Rhythm: Normal rate and regular rhythm.      Pulses: Normal pulses.      Heart sounds: No murmur heard.  Pulmonary:      Effort: Pulmonary effort is normal. No respiratory distress.      Breath sounds: Normal breath sounds. No wheezing or rhonchi.   Abdominal:      General: Bowel sounds are normal. There is no distension.      Palpations: Abdomen is soft.      Tenderness: There is no abdominal tenderness.   Musculoskeletal:      Right lower leg: No edema.      Left lower leg: No edema.   Skin:     General: Skin is warm and dry.      Findings: No rash.   Neurological:      Gait: Gait normal.   Psychiatric:         Mood and Affect: Mood normal.         Behavior: Behavior normal.         Judgment: Judgment normal.      Comments: Fair informant on medical history, often looks to his wife to answer questions            Assessment and Plan: "   Fara is a 78 y.o. male with the following:  Problem List Items Addressed This Visit       (HCC) Hyperlipidemia associated with type 2 diabetes mellitus     Chronic stable problem, he is compliant with medical therapy, continue simvastatin 20 mg daily, LDL is at goal with his history of diabetes.         B12 deficiency     Chronic improved problem, B12 levels are now at goal with adjustment of B12 supplement, his deficiency secondary to use of metformin so we will need to continue with supplementation as long as he is on that medicine.         Benign prostatic hyperplasia with urinary frequency     Chronic ongoing problem, continues to do well on alfuzosin 10 mg daily, was trialed on a different medicine when he thought his insurance would no longer cover it and this caused decompensation of symptoms.  Continue follow-up with urology as recommended.         Controlled type 2 diabetes mellitus with complication, without long-term current use of insulin (HCC)     Chronic stable problem, A1c doing well on current medical regimen, denies any diarrhea or constipation, continue metformin 1000 mg twice daily and Trulicity 0.75 mg weekly which she has tolerated without issue over the past several months.  Recheck again in 3 to 4 months to ensure ongoing stability.         Relevant Orders    POCT  A1C (Completed)    Major depressive disorder with single episode, in full remission (HCC)     Chronic stable problem, mood doing well on current medical regimen, continue fluoxetine 20 mg daily.         Mild cognitive impairment     Chronic ongoing problem, continues to display symptoms consistent with mild cognitive impairment, he will schedule follow-up brain MRI testing which neurology recommends. He has an appointment scheduled with Dr. Mattson for neuropsychological testing in November 2024 with follow-up the following month with Dr. Jama.             Continue close follow-up to help manage multiple chronic medical conditions  including type 2 diabetes which is now under better control as well as depression and BPH which required medication adjustment but are now doing better.  Currently undergoing additional evaluation for mild cognitive impairment, has good support from his wife.  Did travel to [place] in September and will meet with the travel clinic to update necessary vaccines.    RTC: Return in about 3 months (around 8/21/2024).    I spent a total of 32 minutes with record review, exam, communication with the patient, communication with other providers, and documentation of this encounter.    PLEASE NOTE: This dictation was created using voice recognition software. I have made every reasonable attempt to correct obvious errors, but I expect that there are errors of grammar and possibly content that I did not discover before finalizing the note.      Melissa Miller, DO  Geriatric and Internal Medicine  [facility]

## 2024-05-21 NOTE — ASSESSMENT & PLAN NOTE
Chronic stable problem, he is compliant with medical therapy, continue simvastatin 20 mg daily, LDL is at goal with his history of diabetes.

## 2024-05-21 NOTE — ASSESSMENT & PLAN NOTE
Chronic stable problem, A1c doing well on current medical regimen, denies any diarrhea or constipation, continue metformin 1000 mg twice daily and Trulicity 0.75 mg weekly which she has tolerated without issue over the past several months.  Recheck again in 3 to 4 months to ensure ongoing stability.

## 2024-05-21 NOTE — ASSESSMENT & PLAN NOTE
Chronic stable problem, mood doing well on current medical regimen, continue fluoxetine 20 mg daily.

## 2024-05-21 NOTE — ASSESSMENT & PLAN NOTE
Chronic improved problem, B12 levels are now at goal with adjustment of B12 supplement, his deficiency secondary to use of metformin so we will need to continue with supplementation as long as he is on that medicine.

## 2024-07-12 SDOH — ECONOMIC STABILITY: HOUSING INSECURITY: IN THE LAST 12 MONTHS, HOW MANY PLACES HAVE YOU LIVED?: 1

## 2024-07-12 SDOH — ECONOMIC STABILITY: INCOME INSECURITY: IN THE LAST 12 MONTHS, WAS THERE A TIME WHEN YOU WERE NOT ABLE TO PAY THE MORTGAGE OR RENT ON TIME?: NO

## 2024-07-12 SDOH — ECONOMIC STABILITY: INCOME INSECURITY: HOW HARD IS IT FOR YOU TO PAY FOR THE VERY BASICS LIKE FOOD, HOUSING, MEDICAL CARE, AND HEATING?: NOT HARD AT ALL

## 2024-07-12 SDOH — ECONOMIC STABILITY: FOOD INSECURITY: WITHIN THE PAST 12 MONTHS, YOU WORRIED THAT YOUR FOOD WOULD RUN OUT BEFORE YOU GOT MONEY TO BUY MORE.: NEVER TRUE

## 2024-07-12 SDOH — HEALTH STABILITY: PHYSICAL HEALTH: ON AVERAGE, HOW MANY MINUTES DO YOU ENGAGE IN EXERCISE AT THIS LEVEL?: 0 MIN

## 2024-07-12 SDOH — HEALTH STABILITY: PHYSICAL HEALTH: ON AVERAGE, HOW MANY DAYS PER WEEK DO YOU ENGAGE IN MODERATE TO STRENUOUS EXERCISE (LIKE A BRISK WALK)?: 0 DAYS

## 2024-07-12 SDOH — ECONOMIC STABILITY: FOOD INSECURITY: WITHIN THE PAST 12 MONTHS, THE FOOD YOU BOUGHT JUST DIDN'T LAST AND YOU DIDN'T HAVE MONEY TO GET MORE.: NEVER TRUE

## 2024-07-12 ASSESSMENT — SOCIAL DETERMINANTS OF HEALTH (SDOH)
HOW OFTEN DO YOU GET TOGETHER WITH FRIENDS OR RELATIVES?: PATIENT DECLINED
DO YOU BELONG TO ANY CLUBS OR ORGANIZATIONS SUCH AS CHURCH GROUPS UNIONS, FRATERNAL OR ATHLETIC GROUPS, OR SCHOOL GROUPS?: NO
HOW OFTEN DO YOU ATTENT MEETINGS OF THE CLUB OR ORGANIZATION YOU BELONG TO?: NEVER
HOW OFTEN DO YOU HAVE SIX OR MORE DRINKS ON ONE OCCASION: NEVER
HOW OFTEN DO YOU ATTEND CHURCH OR RELIGIOUS SERVICES?: NEVER
HOW OFTEN DO YOU ATTENT MEETINGS OF THE CLUB OR ORGANIZATION YOU BELONG TO?: NEVER
HOW OFTEN DO YOU GET TOGETHER WITH FRIENDS OR RELATIVES?: PATIENT DECLINED
IN A TYPICAL WEEK, HOW MANY TIMES DO YOU TALK ON THE PHONE WITH FAMILY, FRIENDS, OR NEIGHBORS?: TWICE A WEEK
HOW OFTEN DO YOU HAVE A DRINK CONTAINING ALCOHOL: 2-4 TIMES A MONTH
IN A TYPICAL WEEK, HOW MANY TIMES DO YOU TALK ON THE PHONE WITH FAMILY, FRIENDS, OR NEIGHBORS?: TWICE A WEEK
IN THE PAST 12 MONTHS, HAS THE ELECTRIC, GAS, OIL, OR WATER COMPANY THREATENED TO SHUT OFF SERVICE IN YOUR HOME?: NO
WITHIN THE PAST 12 MONTHS, YOU WORRIED THAT YOUR FOOD WOULD RUN OUT BEFORE YOU GOT THE MONEY TO BUY MORE: NEVER TRUE
HOW OFTEN DO YOU ATTEND CHURCH OR RELIGIOUS SERVICES?: NEVER
HOW MANY DRINKS CONTAINING ALCOHOL DO YOU HAVE ON A TYPICAL DAY WHEN YOU ARE DRINKING: 1 OR 2
DO YOU BELONG TO ANY CLUBS OR ORGANIZATIONS SUCH AS CHURCH GROUPS UNIONS, FRATERNAL OR ATHLETIC GROUPS, OR SCHOOL GROUPS?: NO
HOW HARD IS IT FOR YOU TO PAY FOR THE VERY BASICS LIKE FOOD, HOUSING, MEDICAL CARE, AND HEATING?: NOT HARD AT ALL

## 2024-07-12 ASSESSMENT — LIFESTYLE VARIABLES
HOW MANY STANDARD DRINKS CONTAINING ALCOHOL DO YOU HAVE ON A TYPICAL DAY: 1 OR 2
SKIP TO QUESTIONS 9-10: 1
HOW OFTEN DO YOU HAVE SIX OR MORE DRINKS ON ONE OCCASION: NEVER
AUDIT-C TOTAL SCORE: 2
HOW OFTEN DO YOU HAVE A DRINK CONTAINING ALCOHOL: 2-4 TIMES A MONTH

## 2024-07-15 ENCOUNTER — OFFICE VISIT (OUTPATIENT)
Dept: MEDICAL GROUP | Facility: CLINIC | Age: 79
End: 2024-07-15
Payer: MEDICARE

## 2024-07-15 VITALS
BODY MASS INDEX: 21.98 KG/M2 | HEIGHT: 71 IN | TEMPERATURE: 97.6 F | WEIGHT: 157 LBS | OXYGEN SATURATION: 95 % | DIASTOLIC BLOOD PRESSURE: 60 MMHG | HEART RATE: 75 BPM | SYSTOLIC BLOOD PRESSURE: 93 MMHG

## 2024-07-15 DIAGNOSIS — Z71.84 TRAVEL ADVICE ENCOUNTER: ICD-10-CM

## 2024-07-15 PROCEDURE — 99215 OFFICE O/P EST HI 40 MIN: CPT | Mod: 25 | Performed by: FAMILY MEDICINE

## 2024-07-15 ASSESSMENT — FIBROSIS 4 INDEX: FIB4 SCORE: 0.85

## 2024-07-19 ENCOUNTER — HOSPITAL ENCOUNTER (OUTPATIENT)
Dept: RADIOLOGY | Facility: MEDICAL CENTER | Age: 79
End: 2024-07-19
Attending: PSYCHIATRY & NEUROLOGY
Payer: MEDICARE

## 2024-07-19 DIAGNOSIS — G31.84 MILD COGNITIVE IMPAIRMENT: ICD-10-CM

## 2024-07-19 PROCEDURE — 70551 MRI BRAIN STEM W/O DYE: CPT

## 2024-07-19 PROCEDURE — 90715 TDAP VACCINE 7 YRS/> IM: CPT | Performed by: FAMILY MEDICINE

## 2024-07-19 PROCEDURE — 90471 IMMUNIZATION ADMIN: CPT | Performed by: FAMILY MEDICINE

## 2024-07-19 PROCEDURE — 90691 TYPHOID VACCINE IM: CPT | Performed by: FAMILY MEDICINE

## 2024-07-19 PROCEDURE — 90472 IMMUNIZATION ADMIN EACH ADD: CPT | Performed by: FAMILY MEDICINE

## 2024-07-19 RX ORDER — ATOVAQUONE AND PROGUANIL HYDROCHLORIDE 250; 100 MG/1; MG/1
TABLET, FILM COATED ORAL
Qty: 32 TABLET | Refills: 0 | Status: SHIPPED | OUTPATIENT
Start: 2024-07-19

## 2024-07-19 RX ORDER — ONDANSETRON 4 MG/1
4 TABLET, ORALLY DISINTEGRATING ORAL EVERY 6 HOURS PRN
Qty: 10 TABLET | Refills: 0 | Status: SHIPPED | OUTPATIENT
Start: 2024-07-19

## 2024-07-19 RX ORDER — AZITHROMYCIN 500 MG/1
TABLET, FILM COATED ORAL
Qty: 3 TABLET | Refills: 0 | Status: SHIPPED | OUTPATIENT
Start: 2024-07-19

## 2024-07-19 ASSESSMENT — ENCOUNTER SYMPTOMS
GASTROINTESTINAL NEGATIVE: 1
CONSTITUTIONAL NEGATIVE: 1
CARDIOVASCULAR NEGATIVE: 1
PSYCHIATRIC NEGATIVE: 1
EYES NEGATIVE: 1
RESPIRATORY NEGATIVE: 1
NEUROLOGICAL NEGATIVE: 1

## 2024-08-22 ENCOUNTER — OFFICE VISIT (OUTPATIENT)
Dept: MEDICAL GROUP | Facility: PHYSICIAN GROUP | Age: 79
End: 2024-08-22
Payer: MEDICARE

## 2024-08-22 VITALS
BODY MASS INDEX: 22.13 KG/M2 | WEIGHT: 158.1 LBS | SYSTOLIC BLOOD PRESSURE: 110 MMHG | HEIGHT: 71 IN | TEMPERATURE: 97.7 F | OXYGEN SATURATION: 97 % | DIASTOLIC BLOOD PRESSURE: 60 MMHG | HEART RATE: 80 BPM

## 2024-08-22 DIAGNOSIS — R35.0 BENIGN PROSTATIC HYPERPLASIA WITH URINARY FREQUENCY: ICD-10-CM

## 2024-08-22 DIAGNOSIS — E11.69 HYPERLIPIDEMIA ASSOCIATED WITH TYPE 2 DIABETES MELLITUS (HCC): ICD-10-CM

## 2024-08-22 DIAGNOSIS — E11.8 CONTROLLED TYPE 2 DIABETES MELLITUS WITH COMPLICATION, WITHOUT LONG-TERM CURRENT USE OF INSULIN (HCC): ICD-10-CM

## 2024-08-22 DIAGNOSIS — N40.1 BENIGN PROSTATIC HYPERPLASIA WITH URINARY FREQUENCY: ICD-10-CM

## 2024-08-22 DIAGNOSIS — E78.5 HYPERLIPIDEMIA ASSOCIATED WITH TYPE 2 DIABETES MELLITUS (HCC): ICD-10-CM

## 2024-08-22 DIAGNOSIS — G31.84 MILD COGNITIVE IMPAIRMENT: ICD-10-CM

## 2024-08-22 DIAGNOSIS — F32.5 MAJOR DEPRESSIVE DISORDER WITH SINGLE EPISODE, IN FULL REMISSION (HCC): ICD-10-CM

## 2024-08-22 LAB
HBA1C MFR BLD: 6.4 % (ref ?–5.8)
POCT INT CON NEG: NEGATIVE
POCT INT CON POS: POSITIVE

## 2024-08-22 PROCEDURE — 3074F SYST BP LT 130 MM HG: CPT | Performed by: INTERNAL MEDICINE

## 2024-08-22 PROCEDURE — 83036 HEMOGLOBIN GLYCOSYLATED A1C: CPT | Performed by: INTERNAL MEDICINE

## 2024-08-22 PROCEDURE — 3078F DIAST BP <80 MM HG: CPT | Performed by: INTERNAL MEDICINE

## 2024-08-22 PROCEDURE — 99214 OFFICE O/P EST MOD 30 MIN: CPT | Performed by: INTERNAL MEDICINE

## 2024-08-22 ASSESSMENT — FIBROSIS 4 INDEX: FIB4 SCORE: 0.85

## 2024-08-23 NOTE — PROGRESS NOTES
Subjective:   Chief Complaint/History of Present Illness:  Fara Chavira is a 78 y.o. male established patient who presents today to discuss medical problems as listed below. Fara is accompanied by his wife for today's visit.    History of Present Illness  The patient is a 78-year-old male who presents for evaluation of diabetes, benign prostatic hyperplasia (BPH), and high cholesterol levels.    He has a history of high cholesterol, which is currently well-controlled with simvastatin. His diabetes, however, remains a concern with an A1c of 6.9. He is taking metformin and dulaglutide for diabetes management. He reports that his blood sugars are fine, although his A1c was previously noted to be around 10. He recently refilled his simvastatin prescription, which is a 90-day supply, and is due for a renewal of his metformin prescription. The accompanying adult female mentions that they can only  a 1-month supply of Trulicity at a time. He is unsure if the medications he takes are available where he is traveling to and is considering ordering them from [place]. He is requesting a prescription for his injectable medications as he has been asked for it at the airport in the past.    He reports no problems with social interactions, regular bowel movements, and is eating and sleeping well. He has been advised not to touch any animals. He is planning to travel in 3 weeks and will be going on a trip in 09/2024 for a month to [place], a trip he regularly makes every 2 years.    He is following up with Dr. Erika Jama for mild cognitive impairment. He is having trouble conversing with his family, who have noticed him repeating things. He has some insight into these issues, but his wife is particularly concerned and has pushed for further evaluation. He tends to minimize his symptoms. During a recent cognitive assessment, he scored quite well, managing to recall 3 out of 5 words after a delay. He is accompanied  "by an adult female.    An MRI was done by a neurologist yesterday, which showed no acute issues. He is here for a follow-up visit.      Current Medications:  Current Outpatient Medications Ordered in Epic   Medication Sig Dispense Refill    atovaquone-proguanil (MALARONE) 250-100 MG per tablet Take 1 tab po qd for malaria prophylaxis - start 2 days before leaving then continue while away and continue for 1 week upon return 32 Tablet 0    azithromycin (ZITHROMAX) 500 MG tablet Take 1 tab po qd for travelers diarrhea 3 Tablet 0    ondansetron (ZOFRAN ODT) 4 MG TABLET DISPERSIBLE Take 1 Tablet by mouth every 6 hours as needed for Nausea/Vomiting. 10 Tablet 0    ibuprofen (MOTRIN) 800 MG Tab Take 800 mg by mouth every 8 hours as needed for Inflammation or Moderate Pain.      Dulaglutide (TRULICITY) 0.75 MG/0.5ML Solution Pen-injector INJECT 0.5ML UNDER THE SKIN ONCE EVERY 7 DAYS 6 mL 3    alfuzosin (UROXATRAL) 10 MG SR tablet Take 1 Tablet by mouth every day.      metFORMIN ER (GLUCOPHAGE XR) 500 MG TABLET SR 24 HR Take 2 Tablets by mouth 2 times a day. 400 Tablet 3    FLUoxetine (PROZAC) 20 MG Cap Take 1 Capsule by mouth every day. 100 Capsule 3    simvastatin (ZOCOR) 20 MG Tab Take 1 Tablet by mouth every evening. 100 Tablet 3    Cyanocobalamin (B-12) 100 MCG Tab Take 1 Tablet by mouth every day.      ASPIRIN 81 PO Take 1 Each by mouth every day at 6 PM.      cholecalciferol (VITAMIN D3) 400 UNIT Tab Take 400 Units by mouth every day.       No current [facility]-ordered facility-administered medications on file.          Objective:   Physical Exam:    Vitals: /60 (BP Location: Right arm, Patient Position: Sitting, BP Cuff Size: Adult)   Pulse 80   Temp 36.5 °C (97.7 °F) (Temporal)   Ht 1.803 m (5' 11\")   Wt 71.7 kg (158 lb 1.6 oz)   SpO2 97%    BMI: Body mass index is 22.05 kg/m².  Physical Exam  Constitutional:       General: He is not in acute distress.     Appearance: Normal appearance. He is not " ill-appearing.   HENT:      Right Ear: External ear normal.      Left Ear: External ear normal.   Eyes:      General: No scleral icterus.     Conjunctiva/sclera: Conjunctivae normal.   Cardiovascular:      Rate and Rhythm: Normal rate and regular rhythm.      Pulses: Normal pulses.   Pulmonary:      Effort: Pulmonary effort is normal. No respiratory distress.      Breath sounds: Normal breath sounds. No wheezing or rhonchi.   Abdominal:      General: Bowel sounds are normal. There is no distension.      Palpations: Abdomen is soft.   Musculoskeletal:      Right lower leg: No edema.      Left lower leg: No edema.   Skin:     General: Skin is warm and dry.      Findings: No rash.   Neurological:      Gait: Gait normal.   Psychiatric:         Mood and Affect: Mood normal.         Behavior: Behavior normal.         Judgment: Judgment normal.           Results  Laboratory Studies  A1c was 6.9. A1c is now 6.4.    Imaging  MRI of the brain showed no acute changes.    Assessment & Plan  1. Diabetes Mellitus Type 2, controlled  His A1c has improved to 6.4, indicating a prediabetic state. He is currently taking metformin 1000 mg twice daily, dulaglutide 0.75 mg weekly, and simvastatin 20 mg daily. His weight remains stable. He should ensure he has a sufficient supply of his medications for his upcoming trip, particularly Trulicity, as he can only obtain a one-month supply at a time. He should verify the availability of his medications at his travel destination and consider purchasing a small supply to last him the remainder of the year. He should communicate any additional medication needs prior to his departure. Upon his return, he should contact the office to arrange for blood work and a follow-up appointment.    2. Benign Prostatic Hyperplasia (BPH).  His condition remains stable with current medications, continue alfuzosin 10 mg daily. No new symptoms reported.    3. Hyperlipidemia.  His condition is well controlled with  simvastatin 20 mg daily, continue at this time. No new symptoms reported.    4. Mild Cognitive Impairment.  He is under the care of Dr. Jama for mild cognitive impairment. He has been experiencing trouble conversing with his family and repeating things. His wife has expressed concern about his condition. He scored 24 out of 30 on a cognitive test, which is relatively good. Continued monitoring and follow-up with Dr. Jama are recommended.    5. Major depression, full remission  Mood is doing well, he is looking forward to traveling next month. Continue fluoxetine 20 mg daily.     Follow-up  He is scheduled for a follow-up visit in the middle of December 2024.        Assessment and Plan:   Fara is a 78 y.o. male with the following:  Problem List Items Addressed This Visit       (HCC) Hyperlipidemia associated with type 2 diabetes mellitus    Relevant Orders    FREE THYROXINE    TSH    VITAMIN B12    VITAMIN D,25 HYDROXY (DEFICIENCY)    Lipid Profile    Comp Metabolic Panel    CBC WITH DIFFERENTIAL    Benign prostatic hyperplasia with urinary frequency    Controlled type 2 diabetes mellitus with complication, without long-term current use of insulin (HCC)    Relevant Orders    POCT  A1C (Completed)    HEMOGLOBIN A1C    MICROALBUMIN CREAT RATIO URINE    Comp Metabolic Panel    CBC WITH DIFFERENTIAL    Major depressive disorder with single episode, in full remission (HCC)    Mild cognitive impairment    Relevant Orders    VITAMIN B12          RTC: Return in about 4 months (around 12/22/2024).    I spent a total of 32 minutes with record review, exam, communication with the patient, communication with other providers, and documentation of this encounter.    Verbal consent was acquired by the patient to use [employer] ambient listening note generation during this visit Yes       PLEASE NOTE: This dictation was created using voice recognition software. I have made every reasonable attempt to correct obvious errors, but  I expect that there are errors of grammar and possibly content that I did not discover before finalizing the note.      Melissa Miller, DO  Geriatric and Internal Medicine  [facility]

## 2024-11-07 ENCOUNTER — OFFICE VISIT (OUTPATIENT)
Dept: BEHAVIORAL HEALTH | Facility: CLINIC | Age: 79
End: 2024-11-07
Attending: CLINICAL NEUROPSYCHOLOGIST
Payer: MEDICARE

## 2024-11-07 DIAGNOSIS — R41.3 MEMORY CHANGES: ICD-10-CM

## 2024-11-07 DIAGNOSIS — G31.84 MILD COGNITIVE IMPAIRMENT: ICD-10-CM

## 2024-11-07 NOTE — PROGRESS NOTES
Neurobehavioral Status Exam and Neuropsychological Testing    Patient name: Fara Chavira  Referral source: Stan Jama M.D.   MRN: [id number]  Evaluation date: 11/7/2024   YOB: 1945  Neuropsychologist: Christina Mattson, Ph.D.         This evaluation was conducted for clinical treatment planning and may not be valid for other purposes. Potential risks and benefits, limits of confidentiality, and test procedures were discussed. Following this discussion, the patient consented to complete the evaluation. Information for this section was obtained from the medical record and a clinical interview with the patient and his wife (Mrs. Mikayla Chavira) conducted on 11/07/2024. Information included in this section is intended as a reference and should not be interpreted in the absence of the complete neuropsychological report. Please refer to the neuropsychological report for additional information including test results, impressions, and recommendations.     Background and Referral Information: The patient is an 79-year-old, , right-handed,  (from [place]), bilingual (anthony and english), male, retired , with 20 years of education, who has experienced cognitive decline in the context of a previous diagnosis of mild cognitive impairment (MCI). His neurologist, Dr. Jama, referred the patient for a neuropsychological evaluation to characterize his cognitive concerns, aid in differential diagnosis, and treatment planning.    Patient Active Problem List   Diagnosis    Controlled type 2 diabetes mellitus with complication, without long-term current use of insulin (HCC)    (HCC) Hyperlipidemia associated with type 2 diabetes mellitus    Major depressive disorder with single episode, in full remission (HCC)    Benign hemangioma -- Hepatic    Travel advice encounter    Benign prostatic hyperplasia with urinary frequency    Fatty liver    B12 deficiency    Mild cognitive impairment    Memory  changes     Past Medical History:   Diagnosis Date    B12 deficiency 1/29/2020      Ref. Range 1/31/2020 09:14 Vitamin B12 -True Cobalamin Latest Ref Range: 211 - 911 pg/mL >1500 (H)   He has a history of B12 deficiency. He is currently taking 3000 mcg of B12 daily. No neuropathy or gait instability at this time.     Diabetes (HCC)     Diarrhea 1/29/2020    He reports approximately 1 month of intermittent diarrhea starting in December 2019. Nonbloody. Sometimes malodorous. Has not noticed if stool sinks or floats. No nocturnal awakenings for diarrhea. Has been using imodium as needed. No known history of C diff. No known thyroid disease. No prior issues with his metformin. He does report eating spicy foods.   He reports diarrhea has subsided since he    Hyperlipidemia     Hypertension     Vitamin D deficiency 1/29/2020      Ref. Range 1/31/2020 09:14 25-Hydroxy   Vitamin D 25 Latest Ref Range: 30 - 100 ng/mL 37   He has a history of vitamin D deficiency. No history of osteopenia or osteoporosis. He is taking Vitamin D 400 IU daily.      Past Surgical History:   Procedure Laterality Date    OTHER ABDOMINAL SURGERY      Bowel surgery d/t intusussception (doesn't know LI vs. SI, no GI complaints now)      Family History   Problem Relation Age of Onset    Heart Disease Mother     Heart Disease Father     Diabetes Brother     Kidney Disease Brother     Diabetes Sister         Current Outpatient Medications:     atovaquone-proguanil (MALARONE) 250-100 MG per tablet, Take 1 tab po qd for malaria prophylaxis - start 2 days before leaving then continue while away and continue for 1 week upon return, Disp: 32 Tablet, Rfl: 0    azithromycin (ZITHROMAX) 500 MG tablet, Take 1 tab po qd for travelers diarrhea, Disp: 3 Tablet, Rfl: 0    ondansetron (ZOFRAN ODT) 4 MG TABLET DISPERSIBLE, Take 1 Tablet by mouth every 6 hours as needed for Nausea/Vomiting., Disp: 10 Tablet, Rfl: 0    ibuprofen (MOTRIN) 800 MG Tab, Take 800 mg by mouth  every 8 hours as needed for Inflammation or Moderate Pain., Disp: , Rfl:     Dulaglutide (TRULICITY) 0.75 MG/0.5ML Solution Pen-injector, INJECT 0.5ML UNDER THE SKIN ONCE EVERY 7 DAYS, Disp: 6 mL, Rfl: 3    alfuzosin (UROXATRAL) 10 MG SR tablet, Take 1 Tablet by mouth every day., Disp: , Rfl:     metFORMIN ER (GLUCOPHAGE XR) 500 MG TABLET SR 24 HR, Take 2 Tablets by mouth 2 times a day., Disp: 400 Tablet, Rfl: 3    FLUoxetine (PROZAC) 20 MG Cap, Take 1 Capsule by mouth every day., Disp: 100 Capsule, Rfl: 3    simvastatin (ZOCOR) 20 MG Tab, Take 1 Tablet by mouth every evening., Disp: 100 Tablet, Rfl: 3    Cyanocobalamin (B-12) 100 MCG Tab, Take 1 Tablet by mouth every day., Disp: , Rfl:     ASPIRIN 81 PO, Take 1 Each by mouth every day at 6 PM., Disp: , Rfl:     cholecalciferol (VITAMIN D3) 400 UNIT Tab, Take 400 Units by mouth every day., Disp: , Rfl:       Social History     Tobacco Use    Smoking status: Former     Current packs/day: 0.00     Types: Cigarettes     Quit date: 1971     Years since quittin.9    Smokeless tobacco: Never    Tobacco comments:     continued abstinence   Vaping Use    Vaping status: Never Used   Substance and Sexual Activity    Alcohol use: No     Comment: occ    Drug use: No    Sexual activity: Yes     Partners: Female     Birth control/protection: Post-Menopausal      Social Drivers of Health     Alcohol Use: Not At Risk (2024)    AUDIT-C     Frequency of Alcohol Consumption: 2-4 times a month     Average Number of Drinks: 1 or 2     Frequency of Binge Drinking: Never   Depression: Not at risk (2024)    PHQ-2     PHQ-2 Score: 0   Financial Resource Strain: Low Risk  (2024)    Overall Financial Resource Strain (CARDIA)     Difficulty of Paying Living Expenses: Not hard at all   Food Insecurity: No Food Insecurity (2024)    Hunger Vital Sign     Worried About Running Out of Food in the Last Year: Never true     Ran Out of Food in the Last Year: Never true    Housing Stability: Low Risk  (7/12/2024)    Housing Stability Vital Sign     Unable to Pay for Housing in the Last Year: No     Number of Places Lived in the Last Year: 1     Unstable Housing in the Last Year: No   Intimate Partner Violence: Not on file   Physical Activity: Inactive (7/12/2024)    Exercise Vital Sign     Days of Exercise per Week: 0 days     Minutes of Exercise per Session: 0 min   Social Connections: Unknown (7/12/2024)    Social Connection and Isolation Panel [NHANES]     Frequency of Communication with Friends and Family: Twice a week     Frequency of Social Gatherings with Friends and Family: Patient declined     Attends Mandaeism Services: Never     Active Member of Clubs or Organizations: No     Attends Club or Organization Meetings: Never     Marital Status:    Stress: No Stress Concern Present (7/12/2024)    [employer] - Occupational Stress Questionnaire     Feeling of Stress : Only a little   Tobacco Use: Medium Risk (11/7/2024)    Patient History     Smoking Tobacco Use: Former     Smokeless Tobacco Use: Never     Passive Exposure: Not on file   Transportation Needs: No Transportation Needs (7/12/2024)    PRAPARE - Transportation     Lack of Transportation (Medical): No     Lack of Transportation (Non-Medical): No   Utilities: Not At Risk (7/12/2024)    Utilities     Threatened with loss of utilities: No      Measures Administered: Brief Visuospatial Memory Test - Revised (BVMT-R); Erum-Keller Executive Functioning System (DKEFS): Color-Word Interference (CWI), Carson Test (TT), & Verbal Fluency (VF); Executive Clock Drawing Test (CLOX); Generalized Anxiety Disorder 7 Item Scale (RORY-7); Georges Verbal Learning Test - Revised (HVLT-R); Mini-Mental State Examination - 2nd Ed. Orientation (MMSE-2); Neuropsychological Assessment Battery (NAB) Form 1: Numbers and Letters (N & L) & Naming; Patient Health Questionnaire (PHQ-9); Repeatable Battery for the  Assessment of Neuropsychological Status Line Orientation (RBANS LO); Test of Premorbid Functioning (TOPF); Trail Making Test A & B (TMT); Wechsler Adult Intelligence Scale - 4th Ed. (WAIS-IV): Block Design (BD), Digit Span (DS), Matrix Reasoning (MR), Similarities (SI), Vocabulary (VC), & Coding (CD); and Wechsler Memory Scale - 4th Ed. Logical Memory (WMS-IV LM). Informant Questionnaires: Activities of Daily Living Questionnaire (ADLQ) and Neuropsychiatric Inventory Questionnaire (NPI-Q).     Behavioral Observations: The patient arrived at the clinic on time and was accompanied by his wife, who participated in the clinical interview. He was well groomed and dressed. He was alert and oriented to situation, person, place, but incompletely oriented to time (MMSE-2 Orientation = 9/10; incorrect day of the week). He was polite and always maintained appropriate interpersonal boundaries. Rapport was easily established. Gait was unremarkable. No gross abnormal movements or motor symptoms were observed. Insight into cognitive functioning was partial as he attributed his memory problems to normal aging and minimized the problems reported by his wife. Speech rate, volume, articulation, and prosody were normal. Speech content was logical and appropriate to context. Expressive and receptive language were intact during conversation. Mood was euthymic during the appointment. Affect was mood-congruent and appropriate to the situation. There was no indication of hallucinations, delusions, or thought disorder. Vision and hearing were adequate for the evaluation. He was attentive throughout the evaluation and had no difficulties with retention of task demands. He perceived his performance as poor at times and expressed frustration, but responded well to redirection and encouragement from the examiner. Overall, he was engaged and cooperative throughout testing.      Christina Mattson, Ph.D.          Clinical Neuropsychologist           Department of Neurology          [facility]           I spent 50 minutes interviewing the patient (neurobehavioral status exam: 47661 = 1). Four hours and 26 minutes of technician time were spent in test administration and scoring under my supervision (48332 = 1, 95048 = 8).

## 2024-11-08 ENCOUNTER — APPOINTMENT (OUTPATIENT)
Dept: RADIOLOGY | Facility: MEDICAL CENTER | Age: 79
End: 2024-11-08
Attending: STUDENT IN AN ORGANIZED HEALTH CARE EDUCATION/TRAINING PROGRAM
Payer: MEDICARE

## 2024-11-26 NOTE — PATIENT INSTRUCTIONS
Thank you for your effort during today's evaluation. We will have a feedback session to discuss your results on 12/02/2024 at 2 PM.

## 2024-12-02 ENCOUNTER — APPOINTMENT (OUTPATIENT)
Dept: BEHAVIORAL HEALTH | Facility: CLINIC | Age: 79
End: 2024-12-02
Payer: MEDICARE

## 2024-12-02 DIAGNOSIS — G31.84 MILD COGNITIVE IMPAIRMENT: ICD-10-CM

## 2024-12-02 DIAGNOSIS — R41.3 MEMORY CHANGES: ICD-10-CM

## 2024-12-02 PROCEDURE — 96132 NRPSYC TST EVAL PHYS/QHP 1ST: CPT | Performed by: CLINICAL NEUROPSYCHOLOGIST

## 2024-12-02 PROCEDURE — 96133 NRPSYC TST EVAL PHYS/QHP EA: CPT | Performed by: CLINICAL NEUROPSYCHOLOGIST

## 2024-12-02 NOTE — PROGRESS NOTES
INTERACTIVE NEUROPSYCHOLOGICAL FEEDBACK SESSION  Name: Fara Chavira    MRN: [id number]   YOB: 1945   Date of Feedback: 12/02/2024  Referred by: Stan Jama M.D.  Evaluated by: Christina Mattson, Ph.D.        The patient, his wife, and their daughter were seen for an interactive feedback session regarding the patient's neuropsychological evaluation on 11/07/2024. They reported he has been stable in terms of cognitive, functional, emotional, and physical functioning since the neuropsychological evaluation. I discussed the results of the evaluation and the recommendations in detail with the patient as well as his family members and they expressed understanding. The discussion included psychoeducation about mild cognitive impairment, vascular cognitive impairment, Alzheimer's disease, different types of dementia, and other possible contributors to his cognitive decline. Psychoeducation was also provided regarding how mood disturbance and hearing loss can affect cognition. Additional information was provided regarding lifestyle factors associated with maintaining brain health. Follow-up with the referring provider to discuss treatment planning was recommended. The patient and his family members were afforded time to ask questions and all their questions were addressed.    Christina Mattson, Ph.D.          Clinical Neuropsychologist          Department of Neurology          [facility]

## 2024-12-03 NOTE — PROGRESS NOTES
"CONFIDENTIAL NEUROPSYCHOLOGICAL EVALUATION REPORT    Patient name: Fara Chavira  Referral source: Stan Jama M.D.    MRN: [id number]  Evaluation date: 12/2/2024   YOB: 1945  Neuropsychologist: Christina Mattson, Ph.D.         This evaluation was conducted for clinical treatment planning and may not be valid for other purposes. Potential risks and benefits, limits of confidentiality, and test procedures were discussed. Following this discussion, the patient consented to complete the evaluation. Information for this report was obtained from the medical record, neuropsychological testing, and a clinical interview with the patient and his wife (Mrs. Mikayla Chavira) conducted on 11/07/2024. Feedback, including review of results and recommendations, was conducted on 12/02/2024.    Background and Referral Information: The patient is an 79-year-old, , right-handed,  (from [place]), bilingual ([place] and English), male, retired , with 20 years of education, who has experienced cognitive decline in the context of a previous diagnosis of mild cognitive impairment (MCI). His neurologist, Dr. Jama, referred the patient for a neuropsychological evaluation to characterize his cognitive concerns, aid in differential diagnosis, and treatment planning.    Previous Studies: Neurological exam (04/03/2024) was unremarkable. Cognitive screener (04/03/2024) was below expectation (Edgar Cognitive Assessment; MOCA = 24/30). MRI of the brain (07/19/2024) documented: \"Age-related volume loss noted with prominent sulci, cisterns and ventricles. Ventricular dilatation is proportionate to the degree of cerebral volume loss. Gradient echo images are within normal limits. 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 Age-related volume " "loss.\"    Presenting Concerns:     Cognitive Functioning: His wife reported observing short-term memory problems that have remained stable since the patient's [facility] 3 years ago. The patient attributed his memory decline to normal aging. Examples of his current concerns included repeating questions and occasionally forgetting recent conversations, the date, and planned activities. Reminders are typically beneficial. Additionally, he reported occasional word finding problems. His wife noted he has always been “absentminded” without any recent declines in his ability to focus. However, his hearing loss interferes with his auditory attention and comprehension at times. The patient noted he has always been “bad with directions” and relies on GPS, without recent declines. His wife denied observing problems with navigation in familiar locations. The patient denied problems with planning, organizing, or decision making. His wife agreed, though noted he has not engaged in these activities frequently since retiring.    Functional Abilities: His wife reported she has always managed their finances, and the patient has no difficulties with shopping or simple monetary transactions. She stated that she organizes his pillbox, but he takes the medications independently without consistent forgetfulness. The patient is reportedly independent in all self-care and remaining instrumental activities of daily living including scheduling appointments, household responsibilities, and driving (no recent tickets or accidents). 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    Mental Health History: The patient reported he had an episode of depression and was diagnosed with major depressive disorder approximately in 1997 to 1998. He engaged in counseling sessions for a year and treatment with a psychiatrist for approximately 6 months at the time. He was prescribed " fluoxetine, which he continues to take, and his symptoms improved. He denied more recent significant depressive episodes. He also denied a history of suicide attempts or psychiatric hospitalizations.    Emotional Functioning: The patient reported a general loss of interest in activities since he retired. He explained it has been difficult to find new hobbies other than teaching and he tends to lose interest quickly. His wife agreed, noting that his general activity level has decreased. The patient also reported variable appetite. He denied significant sleep problems, though he wakes up 2 to 3 times at night to use the restroom and then falls back asleep. His wife stated that he snores, but she has not observed episodes of gasping for air. The patient described his energy level during the day as “good.” His wife noted that he occasionally falls asleep watching TV during the day. The patient denied persistent sadness, suicidal ideation, fatigue, traumatic stress related symptoms, psychosocial stressors, and increased anxiety. There were no reports of personality changes, acting out dreams, hallucinations, or delusions.      Sensory/Physical/Motor Changes: The patient reported a history of hearing loss. He noted he was recommended to wear hearing aids approximately 5 years ago, but he could not get used to them and stopped wearing them. His wife stated that his hearing problems are more noticeable in crowded or noisy places resulting in him not participating in conversations. He denied recent declines in his vision, taste, or smell.  There were no reports of headaches, chronic pain, balance problems, incontinence, tremors, and fine or gross motor problems.    Medical History: Per his medical record, it includes MCI, controlled type 2 diabetes mellitus with complication (without long-term use of insulin), hyperlipidemia, hypertension, vitamin D deficiency (supplementing), vitamin B12 deficiency (supplementing), benign  hepatic hemangioma, benign prostatic hyperplasia with urinary frequency, and fatty liver. There is no reported history of known seizures, strokes, or traumatic brain injuries. Surgical history includes bowel surgery due to intussusception. Hospitalizations within the past month were denied.    Family Medical History: Remarkable for heart disease, diabetes, and kidney disease.  Known family history of dementia was denied.    Medications and Supplements: Atovaquone-proguanil, azithromycin, ondansetron, ibuprofen, dulaglutide, alfuzosin, metformin, fluoxetine, simvastatin, vitamin B12, aspirin, and vitamin D3.     Psychosocial History: The patient was born and raised in [place]. He denied a known history of birth complications or early developmental delays. He graduated from high school and then obtained a university degree in mechanical engineering in [employer]. He explained that most of his classes throughout school in [place] [employer] were in English. He then earned a master's degree at the [employer], and a PhD in metallurgy and material science at the [employer]. He denied a history of learning, attention, or academic difficulties during his school years. 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 He retired when he was 76 years old. He described himself as fluent in both English and Wil[place]. He noted he learned to speak Thai while growing up as well. He indicated he is more proficient in reading and writing in English compared to Wilmer. He stated he currently uses English more frequently but speaks Wilmer with family members. He lives with his wife of 40 years and one of their daughters in a private residence. They have 2 children that live locally. He reported good social support from family and friends.  He noted he enjoys traveling though, as noted above, he has not engaged in daily hobbies or activities in  recent months.    Substance Use: The patient reported he consumes an average of one alcoholic beverage per month. He denied current use of illicit drugs, marijuana, and nicotine products. He noted he smoked cigarettes socially while he was in college but has been abstinent for approximately 50 years. There is no reported history of substance use disorder or treatment.     Measures Administered: Brief Visuospatial Memory Test - Revised (BVMT-R); Erum-Keller Executive Functioning System (DKEFS): Color-Word Interference (CWI), [place] Test (TT), & Verbal Fluency (VF); Executive Clock Drawing Test (CLOX); Generalized Anxiety Disorder 7 Item Scale (RORY-7); Georges Verbal Learning Test - Revised (HVLT-R); Mini-Mental State Examination - 2nd Ed. Orientation (MMSE-2); Neuropsychological Assessment Battery (NAB) Form 1: Numbers and Letters (N & L) & Naming; Patient Health Questionnaire (PHQ-9); Repeatable Battery for the Assessment of Neuropsychological Status Line Orientation (RBANS LO); Test of Premorbid Functioning (TOPF); Trail Making Test A & B (TMT); Wechsler Adult Intelligence Scale - 4th Ed. (WAIS-IV): Block Design (BD), Digit Span (DS), Matrix Reasoning (MR), Similarities (SI), Vocabulary (VC), & Coding (CD); and Wechsler Memory Scale - 4th Ed. Logical Memory (WMS-IV LM). Informant Questionnaires: Activities of Daily Living Questionnaire (ADLQ) and Neuropsychiatric Inventory Questionnaire (NPI-Q).     Behavioral Observations: The patient arrived at the clinic on time and was accompanied by his wife, who participated in the clinical interview. He was well groomed and dressed. He was alert and oriented to situation, person, place, but incompletely oriented to time (MMSE-2 Orientation = 9/10; incorrect day of the week). He was polite and always maintained appropriate interpersonal boundaries. Rapport was easily established. Gait was unremarkable. No gross abnormal movements or motor symptoms were observed. Insight into  cognitive functioning was partial as he attributed his memory problems to normal aging and minimized the problems reported by his wife. Speech rate, volume, articulation, and prosody were normal. Speech content was logical and appropriate to context. Expressive and receptive language were intact during conversation. Mood was euthymic during the appointment. Affect was mood-congruent and appropriate to the situation. There was no indication of hallucinations, delusions, or thought disorder. Vision and hearing were adequate for the evaluation. He was attentive throughout the evaluation and had no difficulties with retention of task demands. He perceived his performance as poor at times and expressed frustration but responded well to redirection and encouragement from the examiner. Overall, he was engaged and cooperative throughout testing.     Results: Please note that scores reported are for professional use only. For diagnostic purposes, a performance score that falls below the 9th percentile of the reference group for that measure may be considered a cognitive deficit depending on the overall pattern of performance. The following clinical descriptors identify performance within the range of percentile scores indicated in the parentheses: Exceptionally High Score (>98th), Above Average Score (91st-97th), High Average Score (75th-90th), Average Score (25th-74th), Low Average Score (9th-24th), Below Average Score (3rd-8th), and Exceptionally Low Score (<2nd). Please see the test results summary table below for a list of measures administered as well as raw and normative scores, which are listed in the designated columns. All such scores are based on age-corrected norms and certain scores may be adjusted for education and/or other demographic factors as appropriate. The patient's cultural and educational background was taken into consideration in interpreting his performance on the neuropsychological evaluation,  particularly on measures of language functioning. It is noted that descriptive labels of his performance should be interpreted with some caution, due to limitations of available normative data for his demographic background.    Data Validity: The patient's performance on embedded validity indicators was within the valid range, suggesting he appropriately engaged in testing. The following test results are considered an accurate representation of his current level of cognitive functioning.    Test Results Summary Table:          Self-Report Questionnaires: The patient's responses on self-report inventories of emotional functioning were indicative of minimal levels of depression (PHQ-9) and anxiety (RORY-7) over the past two weeks.    Informant Questionnaires: His wife completed a questionnaire about the patient's ability to function independently, implying a minimal level of impairment in activities of daily living (ADLQ). On a questionnaire regarding neuropsychiatric symptoms, his wife reported observing mild apathy and nighttime behaviors coupled with moderate appetite changes (NPI-Q).    Neuropsychological Findings and Impressions    Results Summary/Interpretation: The patient's cultural and educational background was taken into consideration in interpreting his performance on the neuropsychological evaluation, particularly on measures of language functioning. It is noted that descriptive labels of his performance should be interpreted with some caution, due to limitations of available normative data for his demographic background. With this caveat, the patient's premorbid intellectual ability was estimated in the average range based on a measure of word reading. Based on his high level of educational and occupational attainment, his premorbid ability was more likely high average or above. Given his high level of premorbid functioning, current scores in the low average range were considered relatively weak.  Attention/working memory was notable for deficits in selective visual attention, complex divided visual attention, and weak performance on one measure of auditory working memory (sequenced number repetition). Processing speed was characterized by weaknesses in rapid color naming and visuomotor number sequencing speed. Other aspects of attention/working memory and processing speed were intact. On measures of language, there were below expectation performances on phonemic verbal fluency and visual confrontation naming, in addition to weak semantic verbal fluency. Bilingual individuals have been found to experience additional cognitive demand on timed word generation measures and score lower on measures of naming. As such, it is possible that bilingualism and cultural factors interfered with his performance on these tasks. Remaining aspects of language were within normal limits including word reading, vocabulary knowledge, and abstract verbal reasoning. Visuospatial processing was notable for relatively weak judgment of line orientation, which was consistent with normal testing variability in the context of within normal limits performance on all remaining measures within this domain, including more complex tasks that require judging the orientation of lines. Memory was variable. [place] verbal memory (wordlist) was notable for deficient encoding and delayed recall, with a relative improvement to low average with recognition cues. Contextual verbal memory (stories) was characterized by low average encoding, intact delayed recall, and weak recognition. Visual memory (simple figures) was notable for intact encoding, weak delayed recall, and deficient recognition. This pattern of relative improvement in memory performance with added structure in the form of stories and recognition cues on one measure, is suggestive of executive function difficulties interfering with retrieval of information. On measures of executive  functioning, freehand clock drawing was below expectation due to organizational difficulties leading to minor errors. Spatial planning/problem solving was relatively weak. His performance on remaining measures of executive functioning was within normal limits. He reported minimal symptoms of depression and anxiety on self-report measures.    Impression: The patient's cognitive profile revealed impairments in aspects of selective/complex visual attention and executive function coupled with variable memory performance. In addition, there were relative weaknesses in aspects of processing speed, auditory working memory, and executive function. There were below expectation performances in aspects of language as well, which were likely related to bilingualism. Based on his history, cognitive profile, and reported functional status, he continues to meet criteria for mild cognitive impairment (mild neurocognitive disorder). Etiologically, his deficits and weaknesses in attention, processing speed, and executive function are suggestive of frontal-subcortical networks dysfunction, consistent with what is typically seen in patients with vascular cognitive impairment. This is corroborated by his medical history of multiple vascular risk factors and brain MRI findings of leukoaraiosis, albeit mild. Other possible contributing factors to his cognitive difficulties include hearing loss (does not use hearing aids) and mild mood disturbance. Some aspects of his memory performance (poor visual memory and weak rote verbal memory with a flat learning curve and 0% retention) coupled with his reduced naming ability are somewhat concerning for incipient Alzheimer's disease (AD). However, AD is less likely currently, as his naming deficit is confounded by bilingualism and the overall variability in his memory performance is not characteristic of AD. This evaluation may serve as a baseline for longitudinal tracking.       Recommendations:    Based on the present results, the patient is recommended for a repeat neuropsychological evaluation in the next 12 to 18 months or sooner if there is a considerable change in cognitive or emotional status. At that time, diagnostic impressions and treatment recommendations will be revised and updated as necessary. Additional testing will permit comparisons over time to better identify stability, potential improvement, or possible decline.  In light of his cognitive deficits, increased oversight and assistance with complex instrumental activities of daily living (e.g., financial and medication/health management) from a family member or other trusted individual are recommended.   Given his cognitive deficits, increased oversight and assistance from family members or trusted others with complex decision-making (e.g., legal, financial, medical) are recommended.  Regarding work in the future, a diagnosis of mild cognitive impairment (MCI) is typically not a contraindication to engage in full time work. Individuals with MCI are usually able to compensate for their mild cognitive difficulties. Depending on the complexity of the job task, the patient may need to use compensation strategies and require accommodations at work. Examples may include extra time for work related tasks, permission to take more frequent breaks, writing notes, using to do lists, and/or using electronic reminders, but these will vary depending on the specific job task.  In light of his cognitive deficits, it is recommended that the patient be closely monitored by his family or other trusted individuals for future changes or declines while driving to ensure his safety and that of others on the road. If this becomes an area of concern, it would be advised that he undergo a formal driving evaluation conducted by an occupational therapist with expertise in this area: [facility]  (https://www.[facility]/Health-Services/Physical-Therapy; 658.849.7725). Examples to promote safe driving include restricting driving to well-known routes during the day in fair weather with good visibility, having a passenger in the car, limiting external distractions (e.g., radio, cell phone), and avoiding complicated driving situations and highly congested areas.  The patient is encouraged to undergo an audiology re-evaluation and begin wearing hearing aids consistently if they are needed, to rule out sensory contributions to his cognitive decline.   Given his cognitive deficits, the patient may benefit from engagement in speech or occupational therapy with a cognitive rehabilitation component to learn appropriate compensation strategies. He may reach out to me to place a referral as needed.  Given his history of depression and report of loss of interest in activities, if interested, the patient may benefit from the initiation of individual counseling sessions to further assess and treat these symptoms and for additional support coping with current stressors. Evidence-based treatments such as cognitive behavioral therapy, acceptance and commitment therapy, and mindfulness-based interventions may be particularly beneficial: [facility] (https://www.[facility]/health-services/behavioral-health; 982.721.1379 or 281-112-3421). He may reach out to me to place a referral as needed. The following are additional options for psychotherapy in the community:  [facility] (https://www.[facility]/psychology/services-and-community-outreach/sfzwagcodbxyk-zcsefxsb-druwrz; 822.539.2809)  Natalia Pedro, PhD (https://www.[employer]/; 683.709.2920)  [facility] (https://www.[facility]; 160.173.2002)  [facility] (https://www.hpareno.com/index.html; 965.838.7624)  [facility] (https://[employer]; 191.396.9025)   A directory  of providers can also be found on the Psychology Today website (www.psychologytoday.com)  Continued use of compensatory strategies is recommended to reduce cognitive demands. This may include setting scheduled routines, having an established location for essential items (e.g., wallet, glasses, and keys), completing tasks when there are no time demands, breaking down demanding tasks into smaller components, maximizing time when he feels the most alert, completing one task at a time, minimizing external distractions, paraphrasing and repeating to be learned information, and using external aids for reminders (e.g., planner, calendar, setting alarms, labels, to-do lists) consistently.  In light of his medical history, cerebrovascular disease represents a risk factor for future cognitive decline. As such, the patient is encouraged to reduce vascular risk factors per his providers' treatment recommendations (e.g., healthy diet, exercise, and medication adherence). The book Undo It!: How Simple Lifestyle Changes Can Reverse Most Chronic Diseases by Alfredo Guerrero and Cheryl Guerrero may be a helpful resource for the patient and his family.  Individuals with mild cognitive impairment are at a higher risk of developing dementia in the future. As such, the patient is encouraged to regularly engage in social and physical recreation, as well as cognitively stimulating activities (e.g., puzzles, games, reading, exercise, and social gatherings) to promote brain health and emotional well-being. The book Living with Mild Cognitive Impairment: A Guide to Maximizing Brain Health and Reducing Risk of Dementia by Hanane Ordoñez may be a helpful resource for the patient and his family. The [facility] omelett.es [employer] [place] provides diverse educational & social opportunities for adult learners (https://www.olli.[facility].edu; 381.516.9124).  The books Keep Your Wits About You: The Science of Brain Maintenance as You Age by Pooja Draper PhD,  Your Memory: How It Works and How to Improve It by Michel Cardona, and [employer] Guide to Achieving Optimal Memory by Jerome Rudolph and Itzel Vasquez may also be helpful resources for the patient and his family.  The patient and her family may benefit from resources available through the Alzheimer's Association (www.alz.org or 1.233.244.9595) and the American Heart Association (https://www.heart.org/ or 3-991-664-5868), which offer psychoeducational information and services for individuals with mild cognitive impairment and vascular risk factors.  If not already done so, the patient and his family members are encouraged to discuss legal and financial affairs, such as establishing a will and power of  to assist him with future financial and healthcare decisions. Information regarding these issues can be found at www.caringinfo.org or www.agingwithdignity.org/5wishes.html.      Thank you for allowing me to participate in the patient's care. If I can be of further assistance, please do not hesitate to contact me.      Christina Mattson, Ph.D.          Clinical Neuropsychologist          Department of Neurology          [facility]             This report was created using voice recognition software. I have made every reasonable attempt to avoid dictation errors, but this document may contain an error not identified before finalizing. If the error changes the accuracy of the document, I would appreciate it being brought to my attention. Thank you.    Three hours and 54 minutes were spent in clinical decision-making, chart review, records reviews, interpretation of test results and clinical data, integration of patient data, interactive feedback to the patient and his family members, and report preparation (test evaluation services: 62472 = 1, 63420 = 3).

## 2024-12-04 ENCOUNTER — OFFICE VISIT (OUTPATIENT)
Dept: NEUROLOGY | Facility: MEDICAL CENTER | Age: 79
End: 2024-12-04
Attending: PSYCHIATRY & NEUROLOGY
Payer: MEDICARE

## 2024-12-04 VITALS
TEMPERATURE: 96.7 F | DIASTOLIC BLOOD PRESSURE: 74 MMHG | SYSTOLIC BLOOD PRESSURE: 100 MMHG | RESPIRATION RATE: 16 BRPM | HEART RATE: 63 BPM | BODY MASS INDEX: 22.82 KG/M2 | WEIGHT: 163 LBS | HEIGHT: 71 IN | OXYGEN SATURATION: 98 %

## 2024-12-04 DIAGNOSIS — G31.84 MILD COGNITIVE IMPAIRMENT WITH MEMORY LOSS: ICD-10-CM

## 2024-12-04 PROCEDURE — 99417 PROLNG OP E/M EACH 15 MIN: CPT | Performed by: PSYCHIATRY & NEUROLOGY

## 2024-12-04 PROCEDURE — 99215 OFFICE O/P EST HI 40 MIN: CPT | Performed by: PSYCHIATRY & NEUROLOGY

## 2024-12-04 PROCEDURE — 3074F SYST BP LT 130 MM HG: CPT | Performed by: PSYCHIATRY & NEUROLOGY

## 2024-12-04 PROCEDURE — 3078F DIAST BP <80 MM HG: CPT | Performed by: PSYCHIATRY & NEUROLOGY

## 2024-12-04 PROCEDURE — 99212 OFFICE O/P EST SF 10 MIN: CPT | Performed by: PSYCHIATRY & NEUROLOGY

## 2024-12-04 RX ORDER — ASPIRIN 81 MG/1
81 TABLET, CHEWABLE ORAL DAILY
COMMUNITY

## 2024-12-04 ASSESSMENT — FIBROSIS 4 INDEX: FIB4 SCORE: 0.86

## 2024-12-04 ASSESSMENT — PATIENT HEALTH QUESTIONNAIRE - PHQ9: CLINICAL INTERPRETATION OF PHQ2 SCORE: 0

## 2024-12-04 NOTE — PROGRESS NOTES
Neuro follow up visit:    Last visit with me in April 2024.    He is here with his wife.     Reason: Mild Cognitive Impairment    Recent Neuropsychological assessment done at [facility] by Dr. Christina Mattson PhD (11/2024) and summary of results below:    ================================================    Impression: The patient's cognitive profile revealed impairments in aspects of selective/complex visual attention and executive function coupled with variable memory performance. In addition, there were relative weaknesses in aspects of processing speed, auditory working memory, and executive function. There were below expectation performances in aspects of language as well, which were likely related to bilingualism. Based on his history, cognitive profile, and reported functional status, he continues to meet criteria for mild cognitive impairment (mild neurocognitive disorder). Etiologically, his deficits and weaknesses in attention, processing speed, and executive function are suggestive of frontal-subcortical networks dysfunction, consistent with what is typically seen in patients with vascular cognitive impairment. This is corroborated by his medical history of multiple vascular risk factors and brain MRI findings of leukoaraiosis, albeit mild. Other possible contributing factors to his cognitive difficulties include hearing loss (does not use hearing aids) and mild mood disturbance. Some aspects of his memory performance (poor visual memory and weak rote verbal memory with a flat learning curve and 0% retention) coupled with his reduced naming ability are somewhat concerning for incipient Alzheimer's disease (AD). However, AD is less likely currently, as his naming deficit is confounded by bilingualism and the overall variability in his memory performance is not characteristic of AD. This evaluation may serve as a baseline for longitudinal tracking.      ===========================================       Azucena  "Fan 78 y.o. 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  He received his PhD at the [employer] > [employer] area.    He infrequently a class per semester >  \"Structural Materials\"     juanitabertramishaan List:  Sensory Neural Hearing loss.  Type 2 Diabetes- since 25 years.     He is aware regarding his memory and thinking in that places he would occasionally having difficulty getting to his dentist.     Wife noticed about 2-3 years ago that Fara would ask a question and then would ask it again a few hours later and this has been more noticeable since he retired.  On average he may ask the same question over again once usually 3 times a week.     He has not noticed any problems giving his lectures or preparing them.     Communication ability- no clear progression of the ability to understand spoken.     Misplacing personal items- occurring 2 times a week> family member sometimes has to assist him to find say his phone ( once every other week).     He has not noticed any problems with his math or calculations in the recent months.    No paranoia,delusions or hallucinations in the recent 6 months or so.     Personality/Behavioral has not changed per wife in the last few years.     No evolving gait-balance issues noticed by Fara or wife in the last 2 years.     No falls or near falls in the last 6 months or so.     Sleep- 8-9 hours most nights with restful sleep in the recent months. No stigmata of REM Sleep Behavioral symptoms. Minor snoring but no grunting,gasping or apneic events noticed by wife in the recent months. He feels quite refreshed most mornings in the recent months.     Non smoker nor any significant alcohol intake in adult life.     No family history of memory/cognitive/dementia issues known.  Parents-  in their 60s (Coronary Artery Disease).      MOCA:     Mental status: Awake, alert and fully oriented to person, place, time, " and situation. Normal attention and concentration.  Did not appear/act combative,irritable,anxious,paranoid/delusional or aggressive to or with me.     Speech and language: Speech is fluent without errors, clear, intact to repetition, and intact to naming.     Follows 3 step motor commands in sequence without significant delay and correctly.     Cranial nerve exam:  II: Pupils are equally round and reactive to light. Visual fields are intact by confrontation.  III, IV, VI: EOMI, no diplopia, no ptosis.  V: Sensation to light touch is normal over V1-3 distributions bilaterally.  .  VII: Facial movements are symmetrical. There is no facial droop. .  VIII: Hearing intact to soft speech and finger rub bilaterally  IX: Palate elevates symmetrically, uvula is midline. Dysarthria is not present.  XI: Shoulder shrug are symmetrical and strong.   XII: Tongue protrudes midline.        Motor exam:  Muscle tone is normal in all 4 limbs. and No abnormal movements appreciated.     Muscle strength:     Neck Flexors/Extensors: 5/5                                         Right               Left  Deltoid                       5/5                   5/5                                           Biceps                         5/5                   5/5  Triceps                       5/5                   5/5         Wrist extensors           5/5                   5/5  Wrist flexors                5/5                   5/5                               5/5                   5/5  Interossei                    5/5                   5/5  Thenar (APB)              5/5                   5/5         Hip flexors                   5/5                   5/5  Quadriceps                  5/5                   5/5                     Hamstrings                  5/5                   5/5  Dorsiflexors                 5/5                   5/5  Plantarflexors              5/5                   5/5  Toe extension             5/5                    5/5     Reflexes:                                        Right               Left  Biceps                         2/4                   2/4  Triceps                        2/4                   2/4  Brachioradialis            2/4                   2/4  Knee jerk                     2/4                   2/4  Ankle jerk                    2/4                   2/4            Frontal release signs are absent     bilaterally toes are downgoing to plantar stimulation..     Coordination (finger-to-nose, heel/knee/shin, rapid alternating movements) was normal.      There was no ataxia, no tremors, and no dysmetria.      Station and gait >>     Easily stands up from exam chair without retropulsion,veering,leaning,swaying (to either side).     MR-BRAIN-W/O  Order: [id number]   Status: Final result       Visible to patient: Yes (seen)       Next appt: 12/06/2024 at 11:30 AM in Radiology ([facility])       Dx: Mild cognitive impairment    0 Result Notes       1 Patient Communication  Details    Reading Physician Reading Date Result Priority   Sharda Dubon M.D.  [phone] 7/20/2024      Narrative & Impression     7/19/2024 4:23 PM     HISTORY/REASON FOR EXAM: mild cogntive impairment for 2 years; mild cogntive impairment for 2 years        TECHNIQUE/EXAM DESCRIPTION:     T1 sagittal, T2 axial, flair coronal, T1 coronal, and diffusion-weighted axial images were obtained of the brain.     COMPARISON: None.     FINDINGS:     Age-related volume loss noted with prominent sulci, cisterns and ventricles. Ventricular dilatation is proportionate to the degree of cerebral volume loss. Gradient echo images are within normal limits. A few scattered nonspecific T2 hyperintensities are   present in the deep white matter, within expected limits for the age of the patient, most likely related to leukoariosis.     Diffusion-weighted images are normal. No acute infarction is seen.  There is no evidence of intracranial mass or mass  effect. No evidence of midline shift.  There are no extra axial collections.  Visualized intracranial arterial flow voids are within normal limits.  Bone marrow signal in the calvarium is within normal limits.  Included portions of the paranasal sinuses are within normal limits.  Small right mastoid effusion noted. Included portions of the orbits are within normal limits.     IMPRESSION:        No acute process.     Age-related volume loss.        Exam Ended: 07/19/24  4:53 PM Last Resulted: 07/20/24  2:33 PM       Mild Cognitive Impairment with memory disturbances for  the last 1-2 years:     There are no features of parkinsonism or Lewy Body Disease at this time.     Very recent formal Neuropsychological testing done at [facility] with Dr Christina Mattson PhD- c/w Mild Cognitive Impairment.    MOCA score today:24/30 today with 3 out of 5 on delayed word recall> see media for specifics.     Alzheimer's Questionnaire Score today of 5 per wife- see media section for specifics.     IADL Survey per wife >  all 1(s)     Plans:     A. Brain stimulation  and lifestyle activities reviewed today at length today including the Second Edition of the MIND DIET.    B. Anti amyloid medications reviewed today- Kinsunla and Leqembi and risks and potential benefits of such medications (ARIA H and/or E) vs small degree or amount of clinically meaningful benefit known from the clinical trials for these 2 medications. After our discussion today and me answering Fara and his wife's questions, he firmly stated he would not want to proceed with such medications presuming this ongoing condition was related to Alz Disease.    C. We discussed the consideration of Donepezil today and the pros and cons of such a medication. I gave Fara (his wife) some information about this medication to review. At this immediate time he did not want to proceed with this medication.          I have performed  a history and physical exam and a directed /focused   RONNELL today.     Total time spent today or this patient's care was 60 minutes  and included reviewing  the diagnostic workup to date (such as labs and imaging as well as interpreting such tests relevant to this patient's neurological condition),  reviewing/obtaining separately obtained history (from patient and/or wife)  for today's neurological problem(s) ,counseling and educating the patient and family member on issues related to cognition/memory and cognitive health factors and documenting  the clinical information in the EMR.     Follow up in about 6 months or so

## 2024-12-06 ENCOUNTER — APPOINTMENT (OUTPATIENT)
Dept: RADIOLOGY | Facility: MEDICAL CENTER | Age: 79
End: 2024-12-06
Attending: STUDENT IN AN ORGANIZED HEALTH CARE EDUCATION/TRAINING PROGRAM
Payer: MEDICARE

## 2024-12-06 DIAGNOSIS — N32.81 OVERACTIVE BLADDER: ICD-10-CM

## 2024-12-06 PROCEDURE — 76775 US EXAM ABDO BACK WALL LIM: CPT

## 2024-12-16 ENCOUNTER — OFFICE VISIT (OUTPATIENT)
Dept: MEDICAL GROUP | Facility: PHYSICIAN GROUP | Age: 79
End: 2024-12-16
Payer: MEDICARE

## 2024-12-16 VITALS
TEMPERATURE: 97.8 F | HEIGHT: 71 IN | WEIGHT: 162 LBS | RESPIRATION RATE: 14 BRPM | SYSTOLIC BLOOD PRESSURE: 106 MMHG | OXYGEN SATURATION: 97 % | BODY MASS INDEX: 22.68 KG/M2 | HEART RATE: 68 BPM | DIASTOLIC BLOOD PRESSURE: 62 MMHG

## 2024-12-16 DIAGNOSIS — R35.0 BENIGN PROSTATIC HYPERPLASIA WITH URINARY FREQUENCY: ICD-10-CM

## 2024-12-16 DIAGNOSIS — E11.42 DIABETIC POLYNEUROPATHY ASSOCIATED WITH TYPE 2 DIABETES MELLITUS (HCC): ICD-10-CM

## 2024-12-16 DIAGNOSIS — R41.3 MEMORY CHANGES: ICD-10-CM

## 2024-12-16 DIAGNOSIS — E11.8 CONTROLLED TYPE 2 DIABETES MELLITUS WITH COMPLICATION, WITHOUT LONG-TERM CURRENT USE OF INSULIN (HCC): ICD-10-CM

## 2024-12-16 DIAGNOSIS — E11.69 HYPERLIPIDEMIA ASSOCIATED WITH TYPE 2 DIABETES MELLITUS (HCC): ICD-10-CM

## 2024-12-16 DIAGNOSIS — G31.84 MILD COGNITIVE IMPAIRMENT: ICD-10-CM

## 2024-12-16 DIAGNOSIS — F32.5 MAJOR DEPRESSIVE DISORDER WITH SINGLE EPISODE, IN FULL REMISSION (HCC): ICD-10-CM

## 2024-12-16 DIAGNOSIS — N40.1 BENIGN PROSTATIC HYPERPLASIA WITH URINARY FREQUENCY: ICD-10-CM

## 2024-12-16 DIAGNOSIS — E78.5 HYPERLIPIDEMIA ASSOCIATED WITH TYPE 2 DIABETES MELLITUS (HCC): ICD-10-CM

## 2024-12-16 PROCEDURE — 3074F SYST BP LT 130 MM HG: CPT | Performed by: INTERNAL MEDICINE

## 2024-12-16 PROCEDURE — 99214 OFFICE O/P EST MOD 30 MIN: CPT | Performed by: INTERNAL MEDICINE

## 2024-12-16 PROCEDURE — 3078F DIAST BP <80 MM HG: CPT | Performed by: INTERNAL MEDICINE

## 2024-12-16 ASSESSMENT — FIBROSIS 4 INDEX: FIB4 SCORE: 0.86

## 2024-12-16 NOTE — PROGRESS NOTES
Subjective:   Chief Complaint/History of Present Illness:  Fara hCavira is a 79 y.o. male established patient who presents today to discuss medical problems as listed below. Fara is accompanied by his wife.    History of Present Illness  The patient presents for evaluation of diabetes, enlarged prostate, memory issues, and hearing impairment.    He reports a general sense of well-being with stable memory function. He is under the care of a neurologist for memory-related concerns. His mood remains positive, and he continues to engage in social activities. He has not experienced any recent falls or pain episodes. His sleep pattern is normal, and he is able to return to sleep after nighttime bathroom visits. He does not experience any difficulty with chewing or swallowing.    He reports satisfactory urinary function, with no difficulty in initiating urination and a perceived complete bladder emptying. He experiences nighttime urination 2 to 3 times. He is currently on alfuzosin for prostate management, which he tolerates well.    His bowel movements are regular, with no constipation or straining reported.    He uses reading glasses and reports satisfactory vision. He has undergone hearing tests at [facility] but was unable to adjust to the devices. He reports difficulty in distinguishing background noise from speech. He expresses interest in trying new hearing aids if they are affordable.    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      Current Medications:  Current Outpatient Medications Ordered in Epic   Medication Sig Dispense Refill    aspirin (ASA) 81 MG Chew Tab chewable tablet Chew 81 mg every day.      ondansetron (ZOFRAN ODT) 4 MG TABLET DISPERSIBLE Take 1 Tablet by mouth every 6 hours as needed for Nausea/Vomiting. 10 Tablet 0    ibuprofen (MOTRIN) 800 MG Tab Take 800 mg by mouth every 8 hours as needed for Inflammation or Moderate Pain.       "Dulaglutide (TRULICITY) 0.75 MG/0.5ML Solution Pen-injector INJECT 0.5ML UNDER THE SKIN ONCE EVERY 7 DAYS 6 mL 3    alfuzosin (UROXATRAL) 10 MG SR tablet Take 1 Tablet by mouth every day.      metFORMIN ER (GLUCOPHAGE XR) 500 MG TABLET SR 24 HR Take 2 Tablets by mouth 2 times a day. 400 Tablet 3    FLUoxetine (PROZAC) 20 MG Cap Take 1 Capsule by mouth every day. 100 Capsule 3    simvastatin (ZOCOR) 20 MG Tab Take 1 Tablet by mouth every evening. 100 Tablet 3    Cyanocobalamin (B-12) 100 MCG Tab Take 1 Tablet by mouth every day.      ASPIRIN 81 PO Take 1 Each by mouth every day at 6 PM.      cholecalciferol (VITAMIN D3) 400 UNIT Tab Take 400 Units by mouth every day.       No current [facility]-ordered facility-administered medications on file.          Objective:   Physical Exam:    Vitals: /62 (BP Location: Left arm, Patient Position: Sitting, BP Cuff Size: Adult)   Pulse 68   Temp 36.6 °C (97.8 °F)   Resp 14   Ht 1.803 m (5' 11\")   Wt 73.5 kg (162 lb)   SpO2 97%    BMI: Body mass index is 22.59 kg/m².  Physical Exam  Constitutional:       General: He is not in acute distress.     Appearance: Normal appearance. He is not ill-appearing or toxic-appearing.   HENT:      Head: Normocephalic and atraumatic.      Right Ear: Tympanic membrane, ear canal and external ear normal. There is no impacted cerumen.      Left Ear: Tympanic membrane, ear canal and external ear normal. There is no impacted cerumen.      Nose: Nose normal. No congestion or rhinorrhea.      Mouth/Throat:      Mouth: Mucous membranes are moist.      Pharynx: No oropharyngeal exudate or posterior oropharyngeal erythema.   Eyes:      General: No scleral icterus.     Extraocular Movements: Extraocular movements intact.      Conjunctiva/sclera: Conjunctivae normal.      Pupils: Pupils are equal, round, and reactive to light.   Cardiovascular:      Rate and Rhythm: Normal rate and regular rhythm.      Pulses: Normal pulses.      Heart sounds: Normal " heart sounds. No murmur heard.  Pulmonary:      Effort: Pulmonary effort is normal. No respiratory distress.      Breath sounds: Normal breath sounds. No wheezing or rales.   Abdominal:      General: Abdomen is flat. There is no distension.      Palpations: Abdomen is soft.      Tenderness: There is no abdominal tenderness. There is no guarding.   Musculoskeletal:      Cervical back: Normal range of motion. No rigidity.   Lymphadenopathy:      Cervical: No cervical adenopathy.   Skin:     General: Skin is warm and dry.   Neurological:      General: No focal deficit present.      Mental Status: He is alert.      Cranial Nerves: No cranial nerve deficit.   Psychiatric:         Mood and Affect: Mood normal.         Thought Content: Thought content normal.         Judgment: Judgment normal.           Results  Monofilament testing with a 10 gram force: sensation intact: decreased bilaterally , unable to feel on toe in either foot  Visual Inspection: Feet without maceration, ulcers, fissures.  Pedal pulses: intact bilaterally     Assessment & Plan  Type 2 diabetes without use of insulin; hyperlipidemia  Diabetic neuropathy   Repeat A1c to be completed at lab. He had monofilament today and could not feel sensation at either great toe. Recommend wearing socks and shoes at all times and maintaining good food hygiene with nightly skin checks. Continue to work on tight control of blood glucose. Continue current regimen with Trulicity 0.75 mg weekly and metformin 1000 mg twice daily. Eye exam scheduled later today. Does not need nerve pain medicine at this time but could consider in the future.    3. BPH  Nocturia with 2-3 times per night. No LUTS. Tolerating alfuzosin. He does not feel need to adjust medication at this time. Renal ultrasound this month with bladder wall thickening and enlarged prostate.     4. Mild cognitive impairment  5. Hearing impairment   He reports that his memory is stable. He is seeing a neurologist  for this condition. He is advised to continue socializing, exercising, and maintaining a regular healthy diet to support memory function. He has been seen by Audiology in the past but did not like the hearing aids. Recommend that he be re-evaluated.     6. Major depressive disorder, in full remission  Mood stable at this time on fluoxetine 20 mg daily. Sleeps well with no difficulty falling back asleep after getting up to use bathroom      Assessment and Plan:   Fara is a 79 y.o. male with the following:  Problem List Items Addressed This Visit       (HCC) Hyperlipidemia associated with type 2 diabetes mellitus    Benign prostatic hyperplasia with urinary frequency    Controlled type 2 diabetes mellitus with complication, without long-term current use of insulin ([facility])    Relevant Orders    Diabetic Monofilament LE Exam (Completed)    Diabetic polyneuropathy associated with type 2 diabetes mellitus ([facility])    Major depressive disorder with single episode, in full remission ([facility])    Memory changes    Mild cognitive impairment          RTC: 4 mos    I spent a total of 31 minutes with record review, exam, communication with the patient, communication with other providers, and documentation of this encounter.    Verbal consent was acquired by the patient to use [employer] ambient listening note generation during this visit Yes     Patient was seen in conjunction with our geriatric fellow, Dr. Erick Smith. Pertinent details of the history and examination were verified and we discussed the assessment and plan in detail. Orders were reviewed together. I agree with the documentation in the note with additions or changes made as indicated above.     PLEASE NOTE: This dictation was created using voice recognition software. I have made every reasonable attempt to correct obvious errors, but I expect that there are errors of grammar and possibly content that I did not discover before finalizing the note.    Damon Smith,  DO  Geriatric fellow, [employer]      Melissa Miller, DO  Geriatric and Internal Medicine  [facility]

## 2024-12-31 ENCOUNTER — HOSPITAL ENCOUNTER (OUTPATIENT)
Dept: LAB | Facility: MEDICAL CENTER | Age: 79
End: 2024-12-31
Attending: INTERNAL MEDICINE
Payer: MEDICARE

## 2024-12-31 DIAGNOSIS — E11.69 HYPERLIPIDEMIA ASSOCIATED WITH TYPE 2 DIABETES MELLITUS (HCC): ICD-10-CM

## 2024-12-31 DIAGNOSIS — E11.8 CONTROLLED TYPE 2 DIABETES MELLITUS WITH COMPLICATION, WITHOUT LONG-TERM CURRENT USE OF INSULIN (HCC): ICD-10-CM

## 2024-12-31 DIAGNOSIS — G31.84 MILD COGNITIVE IMPAIRMENT: ICD-10-CM

## 2024-12-31 DIAGNOSIS — E78.5 HYPERLIPIDEMIA ASSOCIATED WITH TYPE 2 DIABETES MELLITUS (HCC): ICD-10-CM

## 2024-12-31 LAB
25(OH)D3 SERPL-MCNC: 44 NG/ML (ref 30–100)
ALBUMIN SERPL BCP-MCNC: 4.1 G/DL (ref 3.2–4.9)
ALBUMIN/GLOB SERPL: 1.6 G/DL
ALP SERPL-CCNC: 57 U/L (ref 30–99)
ALT SERPL-CCNC: 20 U/L (ref 2–50)
ANION GAP SERPL CALC-SCNC: 11 MMOL/L (ref 7–16)
AST SERPL-CCNC: 14 U/L (ref 12–45)
BASOPHILS # BLD AUTO: 0.9 % (ref 0–1.8)
BASOPHILS # BLD: 0.06 K/UL (ref 0–0.12)
BILIRUB SERPL-MCNC: 0.6 MG/DL (ref 0.1–1.5)
BUN SERPL-MCNC: 11 MG/DL (ref 8–22)
CALCIUM ALBUM COR SERPL-MCNC: 9.1 MG/DL (ref 8.5–10.5)
CALCIUM SERPL-MCNC: 9.2 MG/DL (ref 8.5–10.5)
CHLORIDE SERPL-SCNC: 103 MMOL/L (ref 96–112)
CHOLEST SERPL-MCNC: 127 MG/DL (ref 100–199)
CO2 SERPL-SCNC: 27 MMOL/L (ref 20–33)
CREAT SERPL-MCNC: 0.76 MG/DL (ref 0.5–1.4)
CREAT UR-MCNC: 66.31 MG/DL
EOSINOPHIL # BLD AUTO: 0.1 K/UL (ref 0–0.51)
EOSINOPHIL NFR BLD: 1.5 % (ref 0–6.9)
ERYTHROCYTE [DISTWIDTH] IN BLOOD BY AUTOMATED COUNT: 44.7 FL (ref 35.9–50)
EST. AVERAGE GLUCOSE BLD GHB EST-MCNC: 140 MG/DL
FASTING STATUS PATIENT QL REPORTED: NORMAL
GFR SERPLBLD CREATININE-BSD FMLA CKD-EPI: 91 ML/MIN/1.73 M 2
GLOBULIN SER CALC-MCNC: 2.6 G/DL (ref 1.9–3.5)
GLUCOSE SERPL-MCNC: 118 MG/DL (ref 65–99)
HBA1C MFR BLD: 6.5 % (ref 4–5.6)
HCT VFR BLD AUTO: 42.9 % (ref 42–52)
HDLC SERPL-MCNC: 42 MG/DL
HGB BLD-MCNC: 14.2 G/DL (ref 14–18)
IMM GRANULOCYTES # BLD AUTO: 0.01 K/UL (ref 0–0.11)
IMM GRANULOCYTES NFR BLD AUTO: 0.1 % (ref 0–0.9)
LDLC SERPL CALC-MCNC: 69 MG/DL
LYMPHOCYTES # BLD AUTO: 2.36 K/UL (ref 1–4.8)
LYMPHOCYTES NFR BLD: 34.3 % (ref 22–41)
MCH RBC QN AUTO: 30.5 PG (ref 27–33)
MCHC RBC AUTO-ENTMCNC: 33.1 G/DL (ref 32.3–36.5)
MCV RBC AUTO: 92.1 FL (ref 81.4–97.8)
MICROALBUMIN UR-MCNC: <1.2 MG/DL
MICROALBUMIN/CREAT UR: NORMAL MG/G (ref 0–30)
MONOCYTES # BLD AUTO: 0.55 K/UL (ref 0–0.85)
MONOCYTES NFR BLD AUTO: 8 % (ref 0–13.4)
NEUTROPHILS # BLD AUTO: 3.8 K/UL (ref 1.82–7.42)
NEUTROPHILS NFR BLD: 55.2 % (ref 44–72)
NRBC # BLD AUTO: 0 K/UL
NRBC BLD-RTO: 0 /100 WBC (ref 0–0.2)
PLATELET # BLD AUTO: 304 K/UL (ref 164–446)
PMV BLD AUTO: 9.3 FL (ref 9–12.9)
POTASSIUM SERPL-SCNC: 4.8 MMOL/L (ref 3.6–5.5)
PROT SERPL-MCNC: 6.7 G/DL (ref 6–8.2)
RBC # BLD AUTO: 4.66 M/UL (ref 4.7–6.1)
SODIUM SERPL-SCNC: 141 MMOL/L (ref 135–145)
T4 FREE SERPL-MCNC: 1.08 NG/DL (ref 0.93–1.7)
TRIGL SERPL-MCNC: 78 MG/DL (ref 0–149)
TSH SERPL-ACNC: 2.88 UIU/ML (ref 0.35–5.5)
VIT B12 SERPL-MCNC: 734 PG/ML (ref 211–911)
WBC # BLD AUTO: 6.9 K/UL (ref 4.8–10.8)

## 2024-12-31 PROCEDURE — 80061 LIPID PANEL: CPT

## 2024-12-31 PROCEDURE — 85025 COMPLETE CBC W/AUTO DIFF WBC: CPT

## 2024-12-31 PROCEDURE — 84439 ASSAY OF FREE THYROXINE: CPT

## 2024-12-31 PROCEDURE — 83036 HEMOGLOBIN GLYCOSYLATED A1C: CPT

## 2024-12-31 PROCEDURE — 80053 COMPREHEN METABOLIC PANEL: CPT

## 2024-12-31 PROCEDURE — 82043 UR ALBUMIN QUANTITATIVE: CPT

## 2024-12-31 PROCEDURE — 82306 VITAMIN D 25 HYDROXY: CPT

## 2024-12-31 PROCEDURE — 82607 VITAMIN B-12: CPT

## 2024-12-31 PROCEDURE — 84443 ASSAY THYROID STIM HORMONE: CPT

## 2024-12-31 PROCEDURE — 36415 COLL VENOUS BLD VENIPUNCTURE: CPT

## 2024-12-31 PROCEDURE — 82570 ASSAY OF URINE CREATININE: CPT

## 2025-03-20 DIAGNOSIS — E11.69 HYPERLIPIDEMIA ASSOCIATED WITH TYPE 2 DIABETES MELLITUS (HCC): ICD-10-CM

## 2025-03-20 DIAGNOSIS — E78.5 HYPERLIPIDEMIA ASSOCIATED WITH TYPE 2 DIABETES MELLITUS (HCC): ICD-10-CM

## 2025-03-21 NOTE — TELEPHONE ENCOUNTER
Received request via: Pharmacy    Was the patient seen in the last year in this department? Yes    Does the patient have an active prescription (recently filled or refills available) for medication(s) requested? No    [facility]    Does the patient have [facility] and need 100-day supply? (This applies to ALL medications) Yes, quantity updated to 100 days

## 2025-03-24 RX ORDER — SIMVASTATIN 20 MG
20 TABLET ORAL EVERY EVENING
Qty: 100 TABLET | Refills: 3 | Status: SHIPPED | OUTPATIENT
Start: 2025-03-24

## 2025-03-28 ENCOUNTER — TELEPHONE (OUTPATIENT)
Dept: MEDICAL GROUP | Facility: PHYSICIAN GROUP | Age: 80
End: 2025-03-28
Payer: MEDICARE

## 2025-03-28 NOTE — TELEPHONE ENCOUNTER
Adherence STARS Outreach for [employer]    This member was identified as non-adherent for [employer] Medicare STAR ratings. Every member that is non-adherent counts against the [employer]'s STAR rating, which directly impacts the Medicare payments made to [employer], thus impacting [employer]'s ability to provide affordable benefits for our members.     03/28/25    Pt was identified on report for STAR medication adherence regarding medications:   Simvastatin  Metformin    Possibly Trulicity    Left  requesting a call back to set up for automatic mailing.     Identified barriers: TBD    Follow up plan: 1 week    Phu Brown, PharmD

## 2025-04-01 ENCOUNTER — TELEPHONE (OUTPATIENT)
Dept: MEDICAL GROUP | Facility: PHYSICIAN GROUP | Age: 80
End: 2025-04-01
Payer: MEDICARE

## 2025-04-01 NOTE — TELEPHONE ENCOUNTER
[facility]/intermediate Plus    Pt and SO decline automatic mail service.    Phu Brown, PharmD

## 2025-04-17 ENCOUNTER — OFFICE VISIT (OUTPATIENT)
Dept: MEDICAL GROUP | Facility: PHYSICIAN GROUP | Age: 80
End: 2025-04-17
Payer: MEDICARE

## 2025-04-17 VITALS
RESPIRATION RATE: 16 BRPM | HEART RATE: 72 BPM | WEIGHT: 156.6 LBS | HEIGHT: 71 IN | BODY MASS INDEX: 21.92 KG/M2 | TEMPERATURE: 98.2 F | OXYGEN SATURATION: 95 % | DIASTOLIC BLOOD PRESSURE: 58 MMHG | SYSTOLIC BLOOD PRESSURE: 96 MMHG

## 2025-04-17 DIAGNOSIS — E11.42 DIABETIC POLYNEUROPATHY ASSOCIATED WITH TYPE 2 DIABETES MELLITUS (HCC): ICD-10-CM

## 2025-04-17 DIAGNOSIS — F32.5 MAJOR DEPRESSIVE DISORDER WITH SINGLE EPISODE, IN FULL REMISSION (HCC): ICD-10-CM

## 2025-04-17 DIAGNOSIS — E11.69 HYPERLIPIDEMIA ASSOCIATED WITH TYPE 2 DIABETES MELLITUS (HCC): ICD-10-CM

## 2025-04-17 DIAGNOSIS — E11.8 CONTROLLED TYPE 2 DIABETES MELLITUS WITH COMPLICATION, WITHOUT LONG-TERM CURRENT USE OF INSULIN (HCC): ICD-10-CM

## 2025-04-17 DIAGNOSIS — N40.1 BENIGN PROSTATIC HYPERPLASIA WITH URINARY FREQUENCY: ICD-10-CM

## 2025-04-17 DIAGNOSIS — R35.0 BENIGN PROSTATIC HYPERPLASIA WITH URINARY FREQUENCY: ICD-10-CM

## 2025-04-17 DIAGNOSIS — E78.5 HYPERLIPIDEMIA ASSOCIATED WITH TYPE 2 DIABETES MELLITUS (HCC): ICD-10-CM

## 2025-04-17 LAB
HBA1C MFR BLD: 6.4 % (ref ?–5.8)
POCT INT CON NEG: NEGATIVE
POCT INT CON POS: POSITIVE

## 2025-04-17 PROCEDURE — 3078F DIAST BP <80 MM HG: CPT | Performed by: INTERNAL MEDICINE

## 2025-04-17 PROCEDURE — 99214 OFFICE O/P EST MOD 30 MIN: CPT | Performed by: INTERNAL MEDICINE

## 2025-04-17 PROCEDURE — 83036 HEMOGLOBIN GLYCOSYLATED A1C: CPT | Performed by: INTERNAL MEDICINE

## 2025-04-17 PROCEDURE — 3074F SYST BP LT 130 MM HG: CPT | Performed by: INTERNAL MEDICINE

## 2025-04-17 ASSESSMENT — PATIENT HEALTH QUESTIONNAIRE - PHQ9
3. TROUBLE FALLING OR STAYING ASLEEP OR SLEEPING TOO MUCH: NOT AT ALL
9. THOUGHTS THAT YOU WOULD BE BETTER OFF DEAD, OR OF HURTING YOURSELF: NOT AT ALL
SUM OF ALL RESPONSES TO PHQ9 QUESTIONS 1 AND 2: 0
5. POOR APPETITE OR OVEREATING: NOT AT ALL
2. FEELING DOWN, DEPRESSED, IRRITABLE, OR HOPELESS: NOT AT ALL
8. MOVING OR SPEAKING SO SLOWLY THAT OTHER PEOPLE COULD HAVE NOTICED. OR THE OPPOSITE, BEING SO FIGETY OR RESTLESS THAT YOU HAVE BEEN MOVING AROUND A LOT MORE THAN USUAL: NOT AT ALL
7. TROUBLE CONCENTRATING ON THINGS, SUCH AS READING THE NEWSPAPER OR WATCHING TELEVISION: NOT AT ALL
1. LITTLE INTEREST OR PLEASURE IN DOING THINGS: NOT AT ALL
4. FEELING TIRED OR HAVING LITTLE ENERGY: NOT AT ALL
SUM OF ALL RESPONSES TO PHQ QUESTIONS 1-9: 0
6. FEELING BAD ABOUT YOURSELF - OR THAT YOU ARE A FAILURE OR HAVE LET YOURSELF OR YOUR FAMILY DOWN: NOT AL ALL

## 2025-04-17 ASSESSMENT — FIBROSIS 4 INDEX: FIB4 SCORE: 0.82

## 2025-04-17 NOTE — PROGRESS NOTES
Subjective:   Chief Complaint/History of Present Illness:  Fara Chavira is a 79 y.o. male established patient who presents today to discuss medical problems as listed below. Fara is accompanied by his wife, Mikayla.    History of Present Illness  The patient presents for a checkup.    Overall good health is reported, with no significant concerns at this time. Morning walks have been added to his routine, which he finds beneficial. Some stress is noted due to a water leak issue at home, necessitating living upstairs. Retired for 2.5 years, he occasionally travels to [place] and maintains regular contact with his children and grandchildren. Membership in a sports car club has been discontinued. Awaiting his wife's surgery and subsequent recovery before planning any major trips. Not currently involved in any social groups in [place]. A recent dental visit reported no issues with teeth or gums.    No issues with the current medication regimen are reported. Bowel movements are regular, with no instances of diarrhea or constipation. Previous diarrhea while on metformin is no longer an issue.    Occasional urinary urgency is experienced but is not considered a serious problem. He is under the care of a urologist for his prostate condition.    SOCIAL HISTORY  He has been retired for 2.5 years and occasionally travels to [place]. He maintains regular contact with his children and grandchildren.     Current Medications:  Current Outpatient Medications Ordered in Epic   Medication Sig Dispense Refill    simvastatin (ZOCOR) 20 MG Tab TAKE ONE TABLET BY MOUTH EVERY EVENING 100 Tablet 3    aspirin (ASA) 81 MG Chew Tab chewable tablet Chew 81 mg every day.      ondansetron (ZOFRAN ODT) 4 MG TABLET DISPERSIBLE Take 1 Tablet by mouth every 6 hours as needed for Nausea/Vomiting. 10 Tablet 0    ibuprofen (MOTRIN) 800 MG Tab Take 800 mg by mouth every 8 hours as needed for Inflammation or Moderate Pain.      Dulaglutide  "(TRULICITY) 0.75 MG/0.5ML Solution Pen-injector INJECT 0.5ML UNDER THE SKIN ONCE EVERY 7 DAYS 6 mL 3    alfuzosin (UROXATRAL) 10 MG SR tablet Take 1 Tablet by mouth every day.      metFORMIN ER (GLUCOPHAGE XR) 500 MG TABLET SR 24 HR Take 2 Tablets by mouth 2 times a day. 400 Tablet 3    FLUoxetine (PROZAC) 20 MG Cap Take 1 Capsule by mouth every day. 100 Capsule 3    Cyanocobalamin (B-12) 100 MCG Tab Take 1 Tablet by mouth every day.      ASPIRIN 81 PO Take 1 Each by mouth every day at 6 PM.      cholecalciferol (VITAMIN D3) 400 UNIT Tab Take 400 Units by mouth every day.       No current [facility]-ordered facility-administered medications on file.          Objective:   Physical Exam:    Vitals: BP 96/58   Pulse 72   Temp 36.8 °C (98.2 °F) (Temporal)   Resp 16   Ht 1.803 m (5' 11\")   Wt 71 kg (156 lb 9.6 oz)   SpO2 95%    BMI: Body mass index is 21.84 kg/m².  Physical Exam  Constitutional:       General: He is not in acute distress.     Appearance: Normal appearance. He is not ill-appearing.   HENT:      Right Ear: External ear normal.      Left Ear: External ear normal.   Eyes:      General: No scleral icterus.     Conjunctiva/sclera: Conjunctivae normal.   Cardiovascular:      Rate and Rhythm: Normal rate and regular rhythm.      Pulses: Normal pulses.   Pulmonary:      Effort: Pulmonary effort is normal. No respiratory distress.      Breath sounds: Normal breath sounds. No wheezing or rhonchi.   Abdominal:      General: Bowel sounds are normal. There is no distension.      Palpations: Abdomen is soft.      Tenderness: There is no abdominal tenderness.   Musculoskeletal:      Right lower leg: No edema.      Left lower leg: No edema.   Lymphadenopathy:      Cervical: No cervical adenopathy.   Skin:     General: Skin is warm and dry.      Findings: No rash.   Psychiatric:         Mood and Affect: Mood normal.         Behavior: Behavior normal.         Thought Content: Thought content normal.         Judgment: " Judgment normal.           Results  Labs   - poc A1c: 6.4    Imaging   - Renal Ultrasound: 12/2024, No kidney stones or retention of fluid on the kidney, but prostate is getting bigger and starting to push on the bladder.    Assessment & Plan  1. Health maintenance.  - Blood pressure readings are within the normal range, indicating good control.  - Current medications primarily include supplements and vitamins, with specific prescriptions for diabetes, mood regulation, cholesterol management, and prostate health.    2. 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  - A1c is 6.4  - Currently on Trulicity 0.75 mg weekly and metformin 1000 mg XR daily.  - Refills for Trulicity and metformin will be confirmed and processed.    3. Major depressive disorder with single episode, in full remission.  - On 20 mg of Prozac and reports mood is stable, although experiencing boredom since senior living.  - Refills for Prozac will be confirmed and processed.    4. Hyperlipidemia associated with type 2 diabetes mellitus.  - On simvastatin for cholesterol management.  - Refills for simvastatin 20 mg daily were done a couple of weeks ago.    5. Enlarged prostate, urinary urgency  - Experiences some urinary urgency but reports it is not very serious.  - Ultrasound conducted in 12/2024 revealed no renal calculi or fluid retention in the kidneys. Prostate enlargement is exerting pressure on the bladder.  - Advised to monitor urinary frequency and report any changes. If symptoms worsen, inform the provider. Continue alfuzosin 10 mg daily.      Assessment and Plan:   Fara is a 79 y.o. male with the following:  Problem List Items Addressed This Visit       (HCC) Hyperlipidemia associated with type 2 diabetes mellitus    Benign prostatic hyperplasia with urinary frequency    Controlled type 2 diabetes mellitus with complication, without long-term current use of insulin (HCC)    Relevant Orders    POCT  Hemoglobin A1C (Completed)    Diabetic polyneuropathy associated with type 2 diabetes mellitus (HCC)    Major depressive disorder with single episode, in full remission (HCC)          RTC: Return in about 4 months (around 8/17/2025) for AWV.    I spent a total of 31 minutes with record review, exam, communication with the patient, communication with other providers, and documentation of this encounter.    Verbal consent was acquired by the patient to use [employer] ambient listening note generation during this visit Yes       PLEASE NOTE: This dictation was created using voice recognition software. I have made every reasonable attempt to correct obvious errors, but I expect that there are errors of grammar and possibly content that I did not discover before finalizing the note.      Melissa Miller, DO  Geriatric and Internal Medicine  [facility]

## 2025-04-19 DIAGNOSIS — F39 MOOD DISORDER (HCC): ICD-10-CM

## 2025-04-19 DIAGNOSIS — E11.8 CONTROLLED TYPE 2 DIABETES MELLITUS WITH COMPLICATION, WITHOUT LONG-TERM CURRENT USE OF INSULIN (HCC): ICD-10-CM

## 2025-04-21 RX ORDER — METFORMIN HYDROCHLORIDE 500 MG/1
1000 TABLET, EXTENDED RELEASE ORAL 2 TIMES DAILY
Qty: 400 TABLET | Refills: 3 | Status: SHIPPED | OUTPATIENT
Start: 2025-04-21

## 2025-04-29 DIAGNOSIS — E11.8 CONTROLLED TYPE 2 DIABETES MELLITUS WITH COMPLICATION, WITHOUT LONG-TERM CURRENT USE OF INSULIN (HCC): ICD-10-CM

## 2025-04-29 RX ORDER — DULAGLUTIDE 0.75 MG/.5ML
0.75 INJECTION, SOLUTION SUBCUTANEOUS
Qty: 6 ML | Refills: 3 | Status: SHIPPED | OUTPATIENT
Start: 2025-04-29

## 2025-04-29 NOTE — TELEPHONE ENCOUNTER
Was the patient seen in the last year in this department? Yes   Does patient have an active prescription for medications requested? No   Received Request Via: Patient  could you please send new prescriptions for Azucena and myself for the two injectable’s. They should be for a 90 day supply, the pharmacy is the [address] They told me they can do a 3 month supply now but the prescription they have is for one month at a time.

## 2025-06-04 ENCOUNTER — APPOINTMENT (OUTPATIENT)
Dept: NEUROLOGY | Facility: MEDICAL CENTER | Age: 80
End: 2025-06-04
Attending: PSYCHIATRY & NEUROLOGY
Payer: MEDICARE

## 2025-06-23 ENCOUNTER — OFFICE VISIT (OUTPATIENT)
Dept: MEDICAL GROUP | Facility: PHYSICIAN GROUP | Age: 80
End: 2025-06-23
Payer: MEDICARE

## 2025-06-23 VITALS
BODY MASS INDEX: 22.5 KG/M2 | SYSTOLIC BLOOD PRESSURE: 102 MMHG | DIASTOLIC BLOOD PRESSURE: 58 MMHG | OXYGEN SATURATION: 96 % | WEIGHT: 160.7 LBS | HEIGHT: 71 IN | HEART RATE: 65 BPM | TEMPERATURE: 97.8 F

## 2025-06-23 DIAGNOSIS — E53.8 B12 DEFICIENCY: ICD-10-CM

## 2025-06-23 DIAGNOSIS — E78.5 HYPERLIPIDEMIA ASSOCIATED WITH TYPE 2 DIABETES MELLITUS (HCC): ICD-10-CM

## 2025-06-23 DIAGNOSIS — E11.69 HYPERLIPIDEMIA ASSOCIATED WITH TYPE 2 DIABETES MELLITUS (HCC): ICD-10-CM

## 2025-06-23 DIAGNOSIS — E11.8 CONTROLLED TYPE 2 DIABETES MELLITUS WITH COMPLICATION, WITHOUT LONG-TERM CURRENT USE OF INSULIN (HCC): Primary | ICD-10-CM

## 2025-06-23 DIAGNOSIS — L85.3 XEROSIS CUTIS: ICD-10-CM

## 2025-06-23 PROCEDURE — 3074F SYST BP LT 130 MM HG: CPT | Performed by: INTERNAL MEDICINE

## 2025-06-23 PROCEDURE — 3078F DIAST BP <80 MM HG: CPT | Performed by: INTERNAL MEDICINE

## 2025-06-23 PROCEDURE — 99213 OFFICE O/P EST LOW 20 MIN: CPT | Performed by: INTERNAL MEDICINE

## 2025-06-23 ASSESSMENT — FIBROSIS 4 INDEX: FIB4 SCORE: 0.82

## 2025-06-23 NOTE — PROGRESS NOTES
"Subjective:   Chief Complaint/History of Present Illness:  Fara Chavira is a 79 y.o. male established patient who presents today to discuss medical problems as listed below. Fara is joined by his wife, Mikayla.    History of Present Illness  The patient presents for evaluation of skin issues.    He has been experiencing unusual skin conditions, particularly on his hands, for the past 2 to 3 weeks. He reports no instances of his fingers locking up and describes the discomfort as more superficial, affecting the skin rather than the joint. He has been using CeraVe lotion, which has provided some relief.    He is due for an annual exam in August. Diabetes remains stable.       Current Medications:  Current Medications and Prescriptions Ordered in Epic[1]       Objective:   Physical Exam:    Vitals: /58   Pulse 65   Temp 36.6 °C (97.8 °F) (Temporal)   Ht 1.803 m (5' 11\")   Wt 72.9 kg (160 lb 11.2 oz)   SpO2 96%    BMI: Body mass index is 22.41 kg/m².  Physical Exam  Constitutional:       General: He is not in acute distress.     Appearance: Normal appearance. He is not ill-appearing.   HENT:      Right Ear: Ear canal normal. There is no impacted cerumen.      Left Ear: Ear canal normal. There is no impacted cerumen.   Eyes:      General: No scleral icterus.     Conjunctiva/sclera: Conjunctivae normal.   Cardiovascular:      Rate and Rhythm: Normal rate and regular rhythm.      Pulses: Normal pulses.      Heart sounds: No murmur heard.  Pulmonary:      Effort: Pulmonary effort is normal. No respiratory distress.      Breath sounds: Normal breath sounds. No wheezing, rhonchi or rales.   Abdominal:      General: Bowel sounds are normal. There is no distension.      Palpations: Abdomen is soft.      Tenderness: There is no abdominal tenderness.   Musculoskeletal:      Right lower leg: No edema.      Left lower leg: No edema.   Lymphadenopathy:      Cervical: No cervical adenopathy.   Skin:     General: Skin " is warm and dry.      Findings: No rash.   Neurological:      Gait: Gait normal.   Psychiatric:         Mood and Affect: Mood normal.         Behavior: Behavior normal.         Thought Content: Thought content normal.         Judgment: Judgment normal.          Assessment & Plan  1. Xerosis- right hand 4th digit.  - Presents with a fissure on his hand, potentially traumatic in nature due to incident in his garage.  - No indication of systemic involvement; dryness noted on physical exam.  - Recommended O'Keeffe's Working Hands cream and CeraVe lotion for maintenance.  - Advised to report any changes such as spreading, color change, or increased roughness; cortisone cream or Kenalog (triamcinolone) may be considered if no improvement.    2. Health maintenance.  3. B12 deficiency  4. Dyslipidemia associated with DM2  5. Controlled DM2 without use of insulin  - Due for an annual exam in August.  - Labs will be ordered to be completed beforehand to follow up on cholesterol, diabetes, and urine microalbumin testing.   - Continue dulaglutide 0.75 mg weekly, metformin 500 mg daily, simvastatin 20 mg daily, and aspirin 81 mg daily.      Assessment and Plan:   Fara is a 79 y.o. male with the following:  Problem List Items Addressed This Visit       (HCC) Hyperlipidemia associated with type 2 diabetes mellitus    Relevant Orders    FREE THYROXINE    TSH    VITAMIN D,25 HYDROXY (DEFICIENCY)    Lipid Profile    Comp Metabolic Panel    CBC WITH DIFFERENTIAL    B12 deficiency    Relevant Orders    VITAMIN B12    Controlled type 2 diabetes mellitus with complication, without long-term current use of insulin (HCC) - Primary    Relevant Orders    HEMOGLOBIN A1C    MICROALBUMIN CREAT RATIO URINE    Xerosis cutis          RTC: Return if symptoms worsen or fail to improve.    I spent a total of 22 minutes with record review, exam, communication with the patient, communication with other providers, and documentation of this  encounter.    Verbal consent was acquired by the patient to use [employer] ambient listening note generation during this visit Yes       PLEASE NOTE: This dictation was created using voice recognition software. I have made every reasonable attempt to correct obvious errors, but I expect that there are errors of grammar and possibly content that I did not discover before finalizing the note.      Melissa Miller, DO  Geriatric and Internal Medicine  [facility]          [1]   Current Outpatient Medications Ordered in Epic   Medication Sig Dispense Refill    Dulaglutide (TRULICITY) 0.75 MG/0.5ML Solution Auto-injector Inject 0.75 mg under the skin every 7 days. INJECT 0.5ML UNDER THE SKIN ONCE EVERY 7 DAYS 6 mL 3    metFORMIN ER (GLUCOPHAGE XR) 500 MG TABLET SR 24 HR TAKE TWO TABLETS BY MOUTH TWICE A  Tablet 3    FLUoxetine (PROZAC) 20 MG Cap TAKE ONE CAPSULE BY MOUTH EVERY  Capsule 3    simvastatin (ZOCOR) 20 MG Tab TAKE ONE TABLET BY MOUTH EVERY EVENING 100 Tablet 3    aspirin (ASA) 81 MG Chew Tab chewable tablet Chew 81 mg every day.      ondansetron (ZOFRAN ODT) 4 MG TABLET DISPERSIBLE Take 1 Tablet by mouth every 6 hours as needed for Nausea/Vomiting. 10 Tablet 0    ibuprofen (MOTRIN) 800 MG Tab Take 800 mg by mouth every 8 hours as needed for Inflammation or Moderate Pain.      alfuzosin (UROXATRAL) 10 MG SR tablet Take 1 Tablet by mouth every day.      Cyanocobalamin (B-12) 100 MCG Tab Take 1 Tablet by mouth every day.      ASPIRIN 81 PO Take 1 Each by mouth every day at 6 PM.      cholecalciferol (VITAMIN D3) 400 UNIT Tab Take 400 Units by mouth every day.       No current [facility]-ordered facility-administered medications on file.

## 2025-07-07 ENCOUNTER — HOSPITAL ENCOUNTER (OUTPATIENT)
Dept: LAB | Facility: MEDICAL CENTER | Age: 80
End: 2025-07-07
Attending: INTERNAL MEDICINE
Payer: MEDICARE

## 2025-07-07 DIAGNOSIS — E11.8 CONTROLLED TYPE 2 DIABETES MELLITUS WITH COMPLICATION, WITHOUT LONG-TERM CURRENT USE OF INSULIN (HCC): ICD-10-CM

## 2025-07-07 DIAGNOSIS — E11.69 HYPERLIPIDEMIA ASSOCIATED WITH TYPE 2 DIABETES MELLITUS (HCC): ICD-10-CM

## 2025-07-07 DIAGNOSIS — E53.8 B12 DEFICIENCY: ICD-10-CM

## 2025-07-07 DIAGNOSIS — E78.5 HYPERLIPIDEMIA ASSOCIATED WITH TYPE 2 DIABETES MELLITUS (HCC): ICD-10-CM

## 2025-07-07 LAB
25(OH)D3 SERPL-MCNC: 59 NG/ML (ref 30–100)
ALBUMIN SERPL BCP-MCNC: 3.9 G/DL (ref 3.2–4.9)
ALBUMIN/GLOB SERPL: 1.5 G/DL
ALP SERPL-CCNC: 56 U/L (ref 30–99)
ALT SERPL-CCNC: 22 U/L (ref 2–50)
ANION GAP SERPL CALC-SCNC: 10 MMOL/L (ref 7–16)
AST SERPL-CCNC: 19 U/L (ref 12–45)
BASOPHILS # BLD AUTO: 0.8 % (ref 0–1.8)
BASOPHILS # BLD: 0.05 K/UL (ref 0–0.12)
BILIRUB SERPL-MCNC: 0.7 MG/DL (ref 0.1–1.5)
BUN SERPL-MCNC: 11 MG/DL (ref 8–22)
CALCIUM ALBUM COR SERPL-MCNC: 9.2 MG/DL (ref 8.5–10.5)
CALCIUM SERPL-MCNC: 9.1 MG/DL (ref 8.5–10.5)
CHLORIDE SERPL-SCNC: 105 MMOL/L (ref 96–112)
CHOLEST SERPL-MCNC: 126 MG/DL (ref 100–199)
CO2 SERPL-SCNC: 25 MMOL/L (ref 20–33)
CREAT SERPL-MCNC: 0.78 MG/DL (ref 0.5–1.4)
CREAT UR-MCNC: 46.1 MG/DL
EOSINOPHIL # BLD AUTO: 0.09 K/UL (ref 0–0.51)
EOSINOPHIL NFR BLD: 1.4 % (ref 0–6.9)
ERYTHROCYTE [DISTWIDTH] IN BLOOD BY AUTOMATED COUNT: 45.1 FL (ref 35.9–50)
EST. AVERAGE GLUCOSE BLD GHB EST-MCNC: 131 MG/DL
FASTING STATUS PATIENT QL REPORTED: NORMAL
GFR SERPLBLD CREATININE-BSD FMLA CKD-EPI: 90 ML/MIN/1.73 M 2
GLOBULIN SER CALC-MCNC: 2.6 G/DL (ref 1.9–3.5)
GLUCOSE SERPL-MCNC: 112 MG/DL (ref 65–99)
HBA1C MFR BLD: 6.2 % (ref 4–5.6)
HCT VFR BLD AUTO: 42.4 % (ref 42–52)
HDLC SERPL-MCNC: 37 MG/DL
HGB BLD-MCNC: 13.3 G/DL (ref 14–18)
IMM GRANULOCYTES # BLD AUTO: 0.02 K/UL (ref 0–0.11)
IMM GRANULOCYTES NFR BLD AUTO: 0.3 % (ref 0–0.9)
LDLC SERPL CALC-MCNC: 68 MG/DL
LYMPHOCYTES # BLD AUTO: 2.6 K/UL (ref 1–4.8)
LYMPHOCYTES NFR BLD: 40.4 % (ref 22–41)
MCH RBC QN AUTO: 29.6 PG (ref 27–33)
MCHC RBC AUTO-ENTMCNC: 31.4 G/DL (ref 32.3–36.5)
MCV RBC AUTO: 94.2 FL (ref 81.4–97.8)
MICROALBUMIN UR-MCNC: <1.2 MG/DL
MICROALBUMIN/CREAT UR: NORMAL MG/G (ref 0–30)
MONOCYTES # BLD AUTO: 0.51 K/UL (ref 0–0.85)
MONOCYTES NFR BLD AUTO: 7.9 % (ref 0–13.4)
NEUTROPHILS # BLD AUTO: 3.16 K/UL (ref 1.82–7.42)
NEUTROPHILS NFR BLD: 49.2 % (ref 44–72)
NRBC # BLD AUTO: 0 K/UL
NRBC BLD-RTO: 0 /100 WBC (ref 0–0.2)
PLATELET # BLD AUTO: 299 K/UL (ref 164–446)
PMV BLD AUTO: 9.3 FL (ref 9–12.9)
POTASSIUM SERPL-SCNC: 4.7 MMOL/L (ref 3.6–5.5)
PROT SERPL-MCNC: 6.5 G/DL (ref 6–8.2)
RBC # BLD AUTO: 4.5 M/UL (ref 4.7–6.1)
SODIUM SERPL-SCNC: 140 MMOL/L (ref 135–145)
T4 FREE SERPL-MCNC: 1.1 NG/DL (ref 0.93–1.7)
TRIGL SERPL-MCNC: 106 MG/DL (ref 0–149)
TSH SERPL-ACNC: 3.01 UIU/ML (ref 0.38–5.33)
VIT B12 SERPL-MCNC: 1568 PG/ML (ref 211–911)
WBC # BLD AUTO: 6.4 K/UL (ref 4.8–10.8)

## 2025-07-07 PROCEDURE — 82607 VITAMIN B-12: CPT

## 2025-07-07 PROCEDURE — 82043 UR ALBUMIN QUANTITATIVE: CPT

## 2025-07-07 PROCEDURE — 36415 COLL VENOUS BLD VENIPUNCTURE: CPT

## 2025-07-07 PROCEDURE — 85025 COMPLETE CBC W/AUTO DIFF WBC: CPT

## 2025-07-07 PROCEDURE — 82306 VITAMIN D 25 HYDROXY: CPT

## 2025-07-07 PROCEDURE — 84439 ASSAY OF FREE THYROXINE: CPT

## 2025-07-07 PROCEDURE — 80053 COMPREHEN METABOLIC PANEL: CPT

## 2025-07-07 PROCEDURE — 83036 HEMOGLOBIN GLYCOSYLATED A1C: CPT

## 2025-07-07 PROCEDURE — 84443 ASSAY THYROID STIM HORMONE: CPT

## 2025-07-07 PROCEDURE — 82570 ASSAY OF URINE CREATININE: CPT

## 2025-07-07 PROCEDURE — 80061 LIPID PANEL: CPT

## 2025-07-09 ENCOUNTER — HOSPITAL ENCOUNTER (OUTPATIENT)
Dept: RADIOLOGY | Facility: MEDICAL CENTER | Age: 80
End: 2025-07-09
Attending: STUDENT IN AN ORGANIZED HEALTH CARE EDUCATION/TRAINING PROGRAM
Payer: MEDICARE

## 2025-07-09 DIAGNOSIS — N32.81 DETRUSOR INSTABILITY OF BLADDER: ICD-10-CM

## 2025-07-09 PROCEDURE — 74177 CT ABD & PELVIS W/CONTRAST: CPT

## 2025-07-09 PROCEDURE — 700117 HCHG RX CONTRAST REV CODE 255: Performed by: STUDENT IN AN ORGANIZED HEALTH CARE EDUCATION/TRAINING PROGRAM

## 2025-07-09 RX ADMIN — IOHEXOL 100 ML: 350 INJECTION, SOLUTION INTRAVENOUS at 14:52

## 2025-08-04 ENCOUNTER — OFFICE VISIT (OUTPATIENT)
Dept: URGENT CARE | Facility: CLINIC | Age: 80
End: 2025-08-04
Payer: MEDICARE

## 2025-08-04 VITALS
DIASTOLIC BLOOD PRESSURE: 52 MMHG | OXYGEN SATURATION: 97 % | WEIGHT: 155.4 LBS | HEIGHT: 71 IN | HEART RATE: 82 BPM | TEMPERATURE: 97.6 F | RESPIRATION RATE: 14 BRPM | SYSTOLIC BLOOD PRESSURE: 104 MMHG | BODY MASS INDEX: 21.76 KG/M2

## 2025-08-04 DIAGNOSIS — T14.8XXA SPLINTER IN SKIN: Primary | ICD-10-CM

## 2025-08-04 PROCEDURE — 99213 OFFICE O/P EST LOW 20 MIN: CPT

## 2025-08-04 PROCEDURE — 3074F SYST BP LT 130 MM HG: CPT

## 2025-08-04 PROCEDURE — 3078F DIAST BP <80 MM HG: CPT

## 2025-08-04 RX ORDER — MUPIROCIN 2 %
1 OINTMENT (GRAM) TOPICAL 2 TIMES DAILY
Qty: 22 G | Refills: 0 | Status: SHIPPED | OUTPATIENT
Start: 2025-08-04

## 2025-08-04 ASSESSMENT — FIBROSIS 4 INDEX: FIB4 SCORE: 1.08

## 2025-08-20 ENCOUNTER — OFFICE VISIT (OUTPATIENT)
Dept: MEDICAL GROUP | Facility: PHYSICIAN GROUP | Age: 80
End: 2025-08-20
Payer: MEDICARE

## 2025-08-20 VITALS
HEIGHT: 71 IN | OXYGEN SATURATION: 98 % | TEMPERATURE: 97.2 F | SYSTOLIC BLOOD PRESSURE: 118 MMHG | WEIGHT: 152 LBS | HEART RATE: 73 BPM | DIASTOLIC BLOOD PRESSURE: 60 MMHG | BODY MASS INDEX: 21.28 KG/M2

## 2025-08-20 DIAGNOSIS — G31.84 MILD COGNITIVE IMPAIRMENT: ICD-10-CM

## 2025-08-20 DIAGNOSIS — E78.5 HYPERLIPIDEMIA ASSOCIATED WITH TYPE 2 DIABETES MELLITUS (HCC): ICD-10-CM

## 2025-08-20 DIAGNOSIS — Z00.00 ENCOUNTER FOR SUBSEQUENT ANNUAL WELLNESS VISIT (AWV) IN MEDICARE PATIENT: Primary | ICD-10-CM

## 2025-08-20 DIAGNOSIS — E11.69 HYPERLIPIDEMIA ASSOCIATED WITH TYPE 2 DIABETES MELLITUS (HCC): ICD-10-CM

## 2025-08-20 DIAGNOSIS — N40.1 BENIGN PROSTATIC HYPERPLASIA WITH URINARY FREQUENCY: ICD-10-CM

## 2025-08-20 DIAGNOSIS — F32.5 MAJOR DEPRESSIVE DISORDER WITH SINGLE EPISODE, IN FULL REMISSION (HCC): ICD-10-CM

## 2025-08-20 DIAGNOSIS — R35.0 BENIGN PROSTATIC HYPERPLASIA WITH URINARY FREQUENCY: ICD-10-CM

## 2025-08-20 DIAGNOSIS — N32.89 BLADDER WALL THICKENING: ICD-10-CM

## 2025-08-20 DIAGNOSIS — D50.9 IRON DEFICIENCY ANEMIA, UNSPECIFIED IRON DEFICIENCY ANEMIA TYPE: ICD-10-CM

## 2025-08-20 PROCEDURE — 3074F SYST BP LT 130 MM HG: CPT | Performed by: INTERNAL MEDICINE

## 2025-08-20 PROCEDURE — 3078F DIAST BP <80 MM HG: CPT | Performed by: INTERNAL MEDICINE

## 2025-08-20 PROCEDURE — 1158F ADVNC CARE PLAN TLK DOCD: CPT | Performed by: INTERNAL MEDICINE

## 2025-08-20 PROCEDURE — G0439 PPPS, SUBSEQ VISIT: HCPCS | Performed by: INTERNAL MEDICINE

## 2025-08-20 ASSESSMENT — PATIENT HEALTH QUESTIONNAIRE - PHQ9
SUM OF ALL RESPONSES TO PHQ QUESTIONS 1-9: 1
CLINICAL INTERPRETATION OF PHQ2 SCORE: 1
5. POOR APPETITE OR OVEREATING: 0 - NOT AT ALL

## 2025-08-20 ASSESSMENT — ENCOUNTER SYMPTOMS: GENERAL WELL-BEING: EXCELLENT

## 2025-08-20 ASSESSMENT — ACTIVITIES OF DAILY LIVING (ADL): BATHING_REQUIRES_ASSISTANCE: 0

## 2025-08-20 ASSESSMENT — FIBROSIS 4 INDEX: FIB4 SCORE: 1.08
